# Patient Record
Sex: FEMALE | Race: BLACK OR AFRICAN AMERICAN | NOT HISPANIC OR LATINO | Employment: OTHER | URBAN - METROPOLITAN AREA
[De-identification: names, ages, dates, MRNs, and addresses within clinical notes are randomized per-mention and may not be internally consistent; named-entity substitution may affect disease eponyms.]

---

## 2018-07-11 ENCOUNTER — OFFICE VISIT (OUTPATIENT)
Dept: URGENT CARE | Facility: CLINIC | Age: 63
End: 2018-07-11
Payer: COMMERCIAL

## 2018-07-11 VITALS
SYSTOLIC BLOOD PRESSURE: 141 MMHG | WEIGHT: 136 LBS | BODY MASS INDEX: 25.7 KG/M2 | TEMPERATURE: 98 F | OXYGEN SATURATION: 98 % | DIASTOLIC BLOOD PRESSURE: 78 MMHG | HEART RATE: 70 BPM

## 2018-07-11 DIAGNOSIS — J30.2 SEASONAL ALLERGIC RHINITIS, UNSPECIFIED TRIGGER: ICD-10-CM

## 2018-07-11 DIAGNOSIS — J01.00 ACUTE NON-RECURRENT MAXILLARY SINUSITIS: Primary | ICD-10-CM

## 2018-07-11 PROCEDURE — 99203 OFFICE O/P NEW LOW 30 MIN: CPT | Mod: 25,S$GLB,, | Performed by: SURGERY

## 2018-07-11 PROCEDURE — 96372 THER/PROPH/DIAG INJ SC/IM: CPT | Mod: S$GLB,,, | Performed by: SURGERY

## 2018-07-11 RX ORDER — BETAMETHASONE SODIUM PHOSPHATE AND BETAMETHASONE ACETATE 3; 3 MG/ML; MG/ML
6 INJECTION, SUSPENSION INTRA-ARTICULAR; INTRALESIONAL; INTRAMUSCULAR; SOFT TISSUE
Status: COMPLETED | OUTPATIENT
Start: 2018-07-11 | End: 2018-07-11

## 2018-07-11 RX ORDER — ALBUTEROL SULFATE 90 UG/1
2 AEROSOL, METERED RESPIRATORY (INHALATION) EVERY 6 HOURS PRN
Qty: 18 G | Refills: 0 | Status: SHIPPED | OUTPATIENT
Start: 2018-07-11 | End: 2019-01-15 | Stop reason: SDUPTHER

## 2018-07-11 RX ORDER — OLOPATADINE HYDROCHLORIDE 665 UG/1
1 SPRAY NASAL 2 TIMES DAILY
Qty: 1 BOTTLE | Refills: 2 | OUTPATIENT
Start: 2018-07-11 | End: 2022-08-16

## 2018-07-11 RX ORDER — MONTELUKAST SODIUM 10 MG/1
TABLET ORAL
COMMUNITY
Start: 2018-05-03 | End: 2019-04-25 | Stop reason: SDUPTHER

## 2018-07-11 RX ORDER — PREDNISONE 20 MG/1
40 TABLET ORAL DAILY
Qty: 10 TABLET | Refills: 0 | Status: SHIPPED | OUTPATIENT
Start: 2018-07-12 | End: 2018-07-17

## 2018-07-11 RX ORDER — TRAZODONE HYDROCHLORIDE 50 MG/1
TABLET ORAL
COMMUNITY
Start: 2018-04-07 | End: 2019-09-09

## 2018-07-11 RX ADMIN — BETAMETHASONE SODIUM PHOSPHATE AND BETAMETHASONE ACETATE 6 MG: 3; 3 INJECTION, SUSPENSION INTRA-ARTICULAR; INTRALESIONAL; INTRAMUSCULAR; SOFT TISSUE at 08:07

## 2018-07-11 NOTE — PROGRESS NOTES
Subjective:       Patient ID: Dayana Su is a 62 y.o. female.    Vitals:  weight is 61.7 kg (136 lb). Her temperature is 97.9 °F (36.6 °C). Her blood pressure is 141/78 (abnormal) and her pulse is 70. Her oxygen saturation is 98%.     Chief Complaint: Sinus Problem    Sinus Problem   This is a recurrent problem. The current episode started more than 1 month ago. The problem is unchanged. There has been no fever. Associated symptoms include congestion, coughing, headaches and sinus pressure. Pertinent negatives include no chills, shortness of breath or sore throat.     Review of Systems   Constitution: Negative for chills and fever.   HENT: Positive for congestion and sinus pressure. Negative for sore throat.    Eyes: Negative for blurred vision.   Cardiovascular: Negative for chest pain.   Respiratory: Positive for cough. Negative for shortness of breath.    Skin: Negative for rash.   Musculoskeletal: Negative for back pain and joint pain.   Gastrointestinal: Negative for abdominal pain, diarrhea, nausea and vomiting.   Neurological: Positive for headaches.   Psychiatric/Behavioral: The patient is not nervous/anxious.    All other systems reviewed and are negative.      Objective:      Physical Exam   Constitutional: She is oriented to person, place, and time. She appears well-developed and well-nourished. She is cooperative.  Non-toxic appearance. She does not appear ill. No distress.   HENT:   Head: Normocephalic and atraumatic.   Right Ear: Hearing, external ear and ear canal normal. A middle ear effusion (clear) is present.   Left Ear: Hearing, external ear and ear canal normal. A middle ear effusion (clear) is present.   Nose: Mucosal edema and rhinorrhea present. No nasal deformity. No epistaxis. Right sinus exhibits maxillary sinus tenderness. Right sinus exhibits no frontal sinus tenderness. Left sinus exhibits maxillary sinus tenderness. Left sinus exhibits no frontal sinus tenderness.    Mouth/Throat: Uvula is midline and mucous membranes are normal. No trismus in the jaw. Normal dentition. No uvula swelling. Posterior oropharyngeal edema and posterior oropharyngeal erythema present. Tonsils are 2+ on the right. Tonsils are 2+ on the left.   Eyes: Conjunctivae and lids are normal. No scleral icterus.   Sclera clear bilat   Neck: Trachea normal, full passive range of motion without pain and phonation normal. Neck supple.   Cardiovascular: Normal rate, regular rhythm, normal heart sounds, intact distal pulses and normal pulses.    Pulmonary/Chest: Effort normal and breath sounds normal. No respiratory distress.   Abdominal: Soft. Normal appearance and bowel sounds are normal. She exhibits no distension. There is no tenderness.   Musculoskeletal: Normal range of motion. She exhibits no edema or deformity.   Neurological: She is alert and oriented to person, place, and time. She exhibits normal muscle tone. Coordination normal.   Skin: Skin is warm, dry and intact. She is not diaphoretic. No pallor.   Psychiatric: She has a normal mood and affect. Her speech is normal and behavior is normal. Judgment and thought content normal. Cognition and memory are normal.   Nursing note and vitals reviewed.      Assessment:       1. Acute non-recurrent maxillary sinusitis    2. Seasonal allergic rhinitis, unspecified trigger        Plan:         Acute non-recurrent maxillary sinusitis  -     betamethasone acetate-betamethasone sodium phosphate injection 6 mg; Inject 1 mL (6 mg total) into the muscle one time.  -     Ambulatory referral to Allergy  -     predniSONE (DELTASONE) 20 MG tablet; Take 2 tablets (40 mg total) by mouth once daily. for 5 days  Dispense: 10 tablet; Refill: 0  -     olopatadine (PATANASE) 0.6 % nasal spray; 1 spray by Each Nare route 2 (two) times daily.  Dispense: 1 Bottle; Refill: 2  -     albuterol 90 mcg/actuation inhaler; Inhale 2 puffs into the lungs every 6 (six) hours as needed for  Wheezing. Rescue  Dispense: 18 g; Refill: 0    Seasonal allergic rhinitis, unspecified trigger          Patient Instructions     Seasonal Allergy  Seasonal allergy is also called hay fever. It may occur after a person is exposed to pollens released from grasses, weeds, trees and shrubs. This type of allergy occurs during the spring and summer when the pollen contacts the lining of the nose, eyes, eyelids, sinuses and throat. This causes histamine to be released from the tissues. Histamine causes itching and swelling. This may produce a watery discharge from the eyes or nose. Violent sneezing, nasal congestion, post-nasal drip, itching of the eyes, nose, throat and mouth, scratchy throat, and dry cough may also occur.  Home care  Seasonal allergy cannot be cured, but symptoms can be reduced by these measures:  · Avoid or reduce exposure to the allergen as much as you can:    ¨ Stay indoors on windy days of pollen season.   ¨ Keep windows and doors closed. Use air conditioning instead in your home and car. This filters the air.  ¨ Change air conditioner filters often.  ¨ Take a shower, wash your hair, and change clothes after being outdoors.  ¨ Put on a NIOSH-rated 95 filter mask when working outdoors. Before going outside, take your allergy medicine as advised by your healthcare provider.  · Decongestant pills and sprays reduce tissue swelling and watery discharge. Overuse of nasal decongestant sprays may make symptoms worse. Do not use these more often than recommended. Sometimes you can experience a rebound effect (symptoms worsen), when stopping them. Talk to your healthcare provider or pharmacist about these medicines before taking them, especially if you have high blood pressure or heart problems.   · Antihistamines block the release of histamine during the allergic response. They work better when taken before symptoms develop. Unless a prescription antihistamine was prescribed, you can take over-the-counter  antihistamines that do not cause drowsiness.  Ask your pharmacist for suggestions.  · Steroid nasal sprays or oral steroids may also be prescribed for more severe symptoms. These help to reduce the local inflammation that can add to the allergic response.  · If you have asthma, pollen season may make your asthma symptoms worse. It is important that you use your asthma medicines as directed during this time to prevent or treat attacks. Some persons with asthma have asthma symptoms that get worse when they take antihistamines. This is due to the drying effect on the lungs. If you notice this, stop the antihistamines, drink extra fluids and notify your doctor.  · If you have sinus congestion or drainage, a saline nasal rinse may give relief. A saline nasal rinse lessens the swelling and clears excess mucus. This allows sinuses to drain. Prepackaged kits are sold at most drug stores. These contain pre-mixed salt packets and an irrigation device.  Follow-up care  Follow up with your healthcare provider or as directed. If you have been referred to a specialist, make an appointment promptly.  When to seek medical advice  Call your healthcare provider for any of the following:  · Facial, ear or sinus pain; colored drainage from the nose  · Headaches  · You have asthma and your asthma symptoms do not respond to the usual doses of your medicine  · Cough with colored sputum (mucus)  · Fever of 100.4°F (38°C) or higher, or as directed by the healthcare provider  Call 911 if any of these occur:  · Trouble breathing or swallowing, wheezing  · Hoarse voice, trouble speaking, or drooling  · Confusion  · Very drowsy or trouble awakening  · Fainting or loss of consciousness  · Rapid heart rate, or weak pulse  · Low blood pressure  · Feeling of doom  · Nausea, vomiting, abdominal pain, diarrhea  · Vomiting blood, or large amounts of blood in stool  · Seizure  · Cold, moist, or pale (blue in color) skin  Date Last Reviewed: 5/1/2017  ©  1237-6628 [employer]. [address] [place] [address], [place] [address]. All rights reserved. This information is not intended as a substitute for professional medical care. Always follow your healthcare professional's instructions.

## 2018-07-11 NOTE — PATIENT INSTRUCTIONS
Seasonal Allergy  Seasonal allergy is also called hay fever. It may occur after a person is exposed to pollens released from grasses, weeds, trees and shrubs. This type of allergy occurs during the spring and summer when the pollen contacts the lining of the nose, eyes, eyelids, sinuses and throat. This causes histamine to be released from the tissues. Histamine causes itching and swelling. This may produce a watery discharge from the eyes or nose. Violent sneezing, nasal congestion, post-nasal drip, itching of the eyes, nose, throat and mouth, scratchy throat, and dry cough may also occur.  Home care  Seasonal allergy cannot be cured, but symptoms can be reduced by these measures:  · Avoid or reduce exposure to the allergen as much as you can:    ¨ Stay indoors on windy days of pollen season.   ¨ Keep windows and doors closed. Use air conditioning instead in your home and car. This filters the air.  ¨ Change air conditioner filters often.  ¨ Take a shower, wash your hair, and change clothes after being outdoors.  ¨ Put on a NIOSH-rated 95 filter mask when working outdoors. Before going outside, take your allergy medicine as advised by your healthcare provider.  · Decongestant pills and sprays reduce tissue swelling and watery discharge. Overuse of nasal decongestant sprays may make symptoms worse. Do not use these more often than recommended. Sometimes you can experience a rebound effect (symptoms worsen), when stopping them. Talk to your healthcare provider or pharmacist about these medicines before taking them, especially if you have high blood pressure or heart problems.   · Antihistamines block the release of histamine during the allergic response. They work better when taken before symptoms develop. Unless a prescription antihistamine was prescribed, you can take over-the-counter antihistamines that do not cause drowsiness.  Ask your pharmacist for suggestions.  · Steroid nasal sprays or oral steroids may  also be prescribed for more severe symptoms. These help to reduce the local inflammation that can add to the allergic response.  · If you have asthma, pollen season may make your asthma symptoms worse. It is important that you use your asthma medicines as directed during this time to prevent or treat attacks. Some persons with asthma have asthma symptoms that get worse when they take antihistamines. This is due to the drying effect on the lungs. If you notice this, stop the antihistamines, drink extra fluids and notify your doctor.  · If you have sinus congestion or drainage, a saline nasal rinse may give relief. A saline nasal rinse lessens the swelling and clears excess mucus. This allows sinuses to drain. Prepackaged kits are sold at most drug stores. These contain pre-mixed salt packets and an irrigation device.  Follow-up care  Follow up with your healthcare provider or as directed. If you have been referred to a specialist, make an appointment promptly.  When to seek medical advice  Call your healthcare provider for any of the following:  · Facial, ear or sinus pain; colored drainage from the nose  · Headaches  · You have asthma and your asthma symptoms do not respond to the usual doses of your medicine  · Cough with colored sputum (mucus)  · Fever of 100.4°F (38°C) or higher, or as directed by the healthcare provider  Call 911 if any of these occur:  · Trouble breathing or swallowing, wheezing  · Hoarse voice, trouble speaking, or drooling  · Confusion  · Very drowsy or trouble awakening  · Fainting or loss of consciousness  · Rapid heart rate, or weak pulse  · Low blood pressure  · Feeling of doom  · Nausea, vomiting, abdominal pain, diarrhea  · Vomiting blood, or large amounts of blood in stool  · Seizure  · Cold, moist, or pale (blue in color) skin  Date Last Reviewed: 5/1/2017  © 0801-0626 [employer]. [address] [place] [address], [place] [address]. All rights reserved. This information is not  intended as a substitute for professional medical care. Always follow your healthcare professional's instructions.

## 2018-11-08 ENCOUNTER — OFFICE VISIT (OUTPATIENT)
Dept: INTERNAL MEDICINE | Facility: CLINIC | Age: 63
End: 2018-11-08
Payer: COMMERCIAL

## 2018-11-08 VITALS
DIASTOLIC BLOOD PRESSURE: 84 MMHG | HEIGHT: 62 IN | SYSTOLIC BLOOD PRESSURE: 132 MMHG | OXYGEN SATURATION: 99 % | HEART RATE: 64 BPM | WEIGHT: 140 LBS | BODY MASS INDEX: 25.76 KG/M2

## 2018-11-08 DIAGNOSIS — Z82.49 FAMILY HISTORY OF HEART DISEASE: ICD-10-CM

## 2018-11-08 DIAGNOSIS — J31.0 CHRONIC RHINITIS: ICD-10-CM

## 2018-11-08 DIAGNOSIS — Z12.39 BREAST CANCER SCREENING: ICD-10-CM

## 2018-11-08 DIAGNOSIS — Z11.59 ENCOUNTER FOR HEPATITIS C SCREENING TEST FOR LOW RISK PATIENT: ICD-10-CM

## 2018-11-08 DIAGNOSIS — G47.00 INSOMNIA, UNSPECIFIED TYPE: ICD-10-CM

## 2018-11-08 DIAGNOSIS — K21.9 GASTROESOPHAGEAL REFLUX DISEASE, ESOPHAGITIS PRESENCE NOT SPECIFIED: ICD-10-CM

## 2018-11-08 DIAGNOSIS — Z00.00 ANNUAL PHYSICAL EXAM: Primary | ICD-10-CM

## 2018-11-08 LAB
BILIRUB UR QL STRIP: NEGATIVE
CLARITY UR REFRACT.AUTO: CLEAR
COLOR UR AUTO: YELLOW
GLUCOSE UR QL STRIP: NEGATIVE
HGB UR QL STRIP: NEGATIVE
KETONES UR QL STRIP: NEGATIVE
LEUKOCYTE ESTERASE UR QL STRIP: NEGATIVE
NITRITE UR QL STRIP: NEGATIVE
PH UR STRIP: 7 [PH] (ref 5–8)
PROT UR QL STRIP: NEGATIVE
SP GR UR STRIP: 1.01 (ref 1–1.03)
URN SPEC COLLECT METH UR: NORMAL

## 2018-11-08 PROCEDURE — 81003 URINALYSIS AUTO W/O SCOPE: CPT

## 2018-11-08 PROCEDURE — 99386 PREV VISIT NEW AGE 40-64: CPT | Mod: S$GLB,,, | Performed by: NURSE PRACTITIONER

## 2018-11-08 PROCEDURE — 99999 PR PBB SHADOW E&M-EST. PATIENT-LVL IV: CPT | Mod: PBBFAC,,, | Performed by: NURSE PRACTITIONER

## 2018-11-08 NOTE — PROGRESS NOTES
Internal Medicine Annual Exam       CHIEF COMPLAINT     The patient, Dayana Su, who is a 62 y.o. female presents for an annual exam.    HPI   Previous PCP Dr Cardona at [facility]     ?asthma vs reactive airway disease- repots occasional coughing and wheezing. Describes thick PND that causes cough in order to clear.  Has been prescribed albuterol inh as needed in past and does not use regularly.   For the sinus drainage pt is using Singulair, Claritin, and patanase - has been seen by ENT in past.     GERD- uses zantac as needed. Worse when eating alitte at night.     Insomnia- uses trazodone sparingly when needed for sleep     HM-   MMG- 2 years ago   C-scope- >5 years ago. No polyps. Unsure of recommended follow up     Past Medical History:  Past Medical History:   Diagnosis Date    H/O tubal ligation     H/O: hysterectomy        History reviewed. No pertinent surgical history.     Family History   Problem Relation Age of Onset    Heart disease Mother          at 46    Heart disease Father          at 67    Heart disease Sister          26    Heart disease Sister          33    Lupus Sister          at 40     Aneurysm Sister          at 38    Heart failure Brother     Hypertension Sister         Social History     Socioeconomic History    Marital status:      Spouse name: Not on file    Number of children: Not on file    Years of education: Not on file    Highest education level: Not on file   Social Needs    Financial resource strain: Not on file    Food insecurity - worry: Not on file    Food insecurity - inability: Not on file    Transportation needs - medical: Not on file    Transportation needs - non-medical: Not on file   Occupational History    Not on file   Tobacco Use    Smoking status: Former Smoker     Packs/day: 2.00     Start date: 1970     Last attempt to quit: 2000     Years since quittin.5   Substance and Sexual  Activity    Alcohol use: No    Drug use: Not on file    Sexual activity: Not on file   Other Topics Concern    Not on file   Social History Narrative    Not on file        Social History     Tobacco Use   Smoking Status Former Smoker    Packs/day: 2.00    Start date: 1970    Last attempt to quit: 2000    Years since quittin.5        Allergies as of 2018 - Reviewed 2018   Allergen Reaction Noted    Pcn [penicillins] Hives 2014          Home Medications:  Prior to Admission medications    Medication Sig Start Date End Date Taking? Authorizing Provider   albuterol 90 mcg/actuation inhaler Inhale 2 puffs into the lungs every 6 (six) hours as needed for Wheezing. Rescue 18 Yes Cydney Delgado MD   loratadine (CLARITIN) 10 mg tablet TAKE 1 TABLET BY MOUTH EVERY DAY 9/4/15  Yes Edy Elder MD   montelukast (SINGULAIR) 10 mg tablet  5/3/18  Yes Historical Provider, MD   olopatadine (PATANASE) 0.6 % nasal spray 1 spray by Each Nare route 2 (two) times daily. 18 Yes Cydney Delgado MD   ranitidine (ZANTAC) 300 MG tablet  18  Yes Historical Provider, MD   traZODone (DESYREL) 50 MG tablet  18  Yes Historical Provider, MD       Review of Systems:  Review of Systems   Constitutional: Positive for diaphoresis (hot flashes). Negative for activity change, appetite change, chills, fatigue and fever.   HENT: Positive for postnasal drip and rhinorrhea. Negative for congestion, sinus pressure, sinus pain and sore throat.    Eyes: Positive for visual disturbance (ophthaalmology <1 year ago ).   Respiratory: Positive for cough. Negative for shortness of breath and wheezing.    Cardiovascular: Negative for chest pain, palpitations and leg swelling.   Gastrointestinal: Negative for abdominal pain, blood in stool, constipation, diarrhea, nausea and vomiting.        +heartburn    Genitourinary: Positive for frequency. Negative for difficulty urinating, dysuria,  "pelvic pain and urgency.   Musculoskeletal: Positive for arthralgias (bilateral hip and knee pain. No treatment. ).   Skin: Negative for rash.   Neurological: Positive for headaches. Negative for dizziness, weakness and light-headedness.   Psychiatric/Behavioral: Positive for sleep disturbance. Negative for dysphoric mood. The patient is not nervous/anxious.        Health Maintainence:   Immunizations:  Health Maintenance       Date Due Completion Date    Hepatitis C Screening 1955 ---    Lipid Panel 1955 ---    TETANUS VACCINE 12/25/1972 ---    Mammogram 12/25/1994 ---    Colonoscopy 12/25/2004 ---    Zoster Vaccine 12/25/2014 ---    Influenza Vaccine 08/01/2018 ---           PHYSICAL EXAM     BP (!) 158/92 (BP Location: Left arm, Patient Position: Sitting, BP Method: Large (Manual))   Pulse 64   Ht 5' 1.5" (1.562 m)   Wt 63.5 kg (139 lb 15.9 oz)   SpO2 99%   BMI 26.02 kg/m²  Body mass index is 26.02 kg/m².    Physical Exam   Constitutional: She is oriented to person, place, and time. She appears well-developed and well-nourished.   HENT:   Head: Normocephalic.   Right Ear: External ear normal. No mastoid tenderness. Tympanic membrane is scarred. A middle ear effusion is present.   Left Ear: External ear normal. No mastoid tenderness. Tympanic membrane is scarred. A middle ear effusion is present.   Nose: Nose normal.   Mouth/Throat: Oropharynx is clear and moist. No oropharyngeal exudate.   Eyes: Pupils are equal, round, and reactive to light.   Neck: Neck supple. No JVD present. No tracheal deviation present. No thyromegaly present.   Cardiovascular: Normal rate, regular rhythm, normal heart sounds and intact distal pulses. Exam reveals no gallop and no friction rub.   No murmur heard.  Pulmonary/Chest: Effort normal and breath sounds normal. No respiratory distress. She has no wheezes. She has no rales.   Abdominal: Soft. Bowel sounds are normal. She exhibits no distension. There is no " tenderness.   Musculoskeletal: Normal range of motion. She exhibits no edema or tenderness.   Lymphadenopathy:     She has no cervical adenopathy.   Neurological: She is alert and oriented to person, place, and time.   Skin: Skin is warm and dry. No rash noted.   Psychiatric: She has a normal mood and affect. Her behavior is normal.   Vitals reviewed.      LABS     No results found for: LABA1C, HGBA1C  CMP  No results found for: NA, K, CL, CO2, GLU, BUN, CREATININE, CALCIUM, PROT, ALBUMIN, BILITOT, ALKPHOS, AST, ALT, ANIONGAP, ESTGFRAFRICA, EGFRNONAA  No results found for: WBC, HGB, HCT, MCV, PLT  No results found for: CHOL  No results found for: HDL  No results found for: LDLCALC  No results found for: TRIG  No results found for: CHOLHDL  No results found for: TSH, J6EFIVE, L8BCXXY, THYROIDAB    ASSESSMENT/PLAN     Dayana Su is a 62 y.o. female with  Past Medical History:   Diagnosis Date    H/O tubal ligation 1978    H/O: hysterectomy 1986     Annual physical exam- all age and gender related screenings.   -     CBC auto differential; Future; Expected date: 11/08/2018  -     Comprehensive metabolic panel; Future; Expected date: 11/08/2018  -     TSH; Future; Expected date: 11/08/2018  -     T4, free; Future; Expected date: 11/08/2018  -     Urinalysis  -     Vitamin D; Future; Expected date: 11/08/2018  -     Lipid panel; Future; Expected date: 11/08/2018    Chronic rhinitis- will refer to ENT    GERD- stable. Use zantac as needed.     Insomnia- stable. Cont trazodone as needed.  Sleep hygiene discussed.     Family history of heart disease- send for fasting lipids and stress. May benefit from cardiology referral.   -     Exercise stress echo; Future  -     Lipid panel; Future; Expected date: 11/08/2018    Breast cancer screening  -     Mammo Digital Screening Bilat with CAD; Future; Expected date: 11/08/2018    Encounter for hepatitis C screening test for low risk patient  -     Hepatitis C  antibody; Future; Expected date: 11/08/2018    Follow up as needed and with new PCP in 3 months     Lindsey BARRIENTOS, CHASE, FNP-c   Department of Internal Medicine - [facility] [address]  1:09 PM

## 2018-11-13 ENCOUNTER — HOSPITAL ENCOUNTER (OUTPATIENT)
Dept: RADIOLOGY | Facility: HOSPITAL | Age: 63
Discharge: HOME | End: 2018-11-13
Attending: NURSE PRACTITIONER
Payer: COMMERCIAL

## 2018-11-13 ENCOUNTER — HOSPITAL ENCOUNTER (OUTPATIENT)
Dept: CARDIOLOGY | Facility: CLINIC | Age: 63
Discharge: HOME | End: 2018-11-13
Attending: NURSE PRACTITIONER
Payer: COMMERCIAL

## 2018-11-13 VITALS — BODY MASS INDEX: 25.49 KG/M2 | WEIGHT: 135 LBS | HEIGHT: 61 IN

## 2018-11-13 DIAGNOSIS — Z82.49 FAMILY HISTORY OF HEART DISEASE: ICD-10-CM

## 2018-11-13 DIAGNOSIS — Z12.39 BREAST CANCER SCREENING: ICD-10-CM

## 2018-11-13 LAB
ASCENDING AORTA: 2.58 CM
BSA FOR ECHO PROCEDURE: 1.62 M2
CV ECHO LV RWT: 0.34 CM
CV STRESS BASE HR: 64
DIASTOLIC BLOOD PRESSURE: 74
DOP CALC LVOT AREA: 2.77 CM2
DOP CALC LVOT DIAMETER: 1.88 CM
DOP CALC LVOT STROKE VOLUME: 60.18 CM3
DOP CALCLVOT PEAK VEL VTI: 21.69 CM
E WAVE DECELERATION TIME: 186.13 MSEC
E/A RATIO: 1
E/E' RATIO: 7.71
ECHO LV POSTERIOR WALL: 0.66 CM (ref 0.6–1.1)
FRACTIONAL SHORTENING: 34 % (ref 28–44)
INTERVENTRICULAR SEPTUM: 0.81 CM (ref 0.6–1.1)
LA MAJOR: 3.89 CM
LA MINOR: 3.77 CM
LA WIDTH: 3.08 CM
LEFT ATRIUM SIZE: 2.96 CM
LEFT ATRIUM VOLUME INDEX: 18.3 ML/M2
LEFT ATRIUM VOLUME: 29.67 CM3
LEFT INTERNAL DIMENSION IN SYSTOLE: 2.6 CM (ref 2.1–4)
LEFT VENTRICLE DIASTOLIC VOLUME INDEX: 41.38 ML/M2
LEFT VENTRICLE DIASTOLIC VOLUME: 67.03 ML
LEFT VENTRICLE MASS INDEX: 50.1 G/M2
LEFT VENTRICLE SYSTOLIC VOLUME INDEX: 15.2 ML/M2
LEFT VENTRICLE SYSTOLIC VOLUME: 24.64 ML
LEFT VENTRICULAR INTERNAL DIMENSION IN DIASTOLE: 3.93 CM (ref 3.5–6)
LEFT VENTRICULAR MASS: 81.11 G
LV LATERAL E/E' RATIO: 6.75
LV SEPTAL E/E' RATIO: 9
MV PEAK A VEL: 0.81 M/S
MV PEAK E VEL: 0.81 M/S
OHS CV CPX 1 MINUTE RECOVERY HEART RATE: 96 BPM
OHS CV CPX 85 PERCENT MAX PREDICTED HEART RATE MALE: 128
OHS CV CPX ESTIMATED METS: 11
OHS CV CPX MAX PREDICTED HEART RATE: 151
OHS CV CPX PATIENT IS FEMALE: 1
OHS CV CPX PATIENT IS MALE: 0
OHS CV CPX PEAK DIASTOLIC BLOOD PRESSURE: 77 MMHG
OHS CV CPX PEAK HEAR RATE: 139
OHS CV CPX PEAK RATE PRESSURE PRODUCT: NORMAL
OHS CV CPX PEAK SYSTOLIC BLOOD PRESSURE: 187
OHS CV CPX PERCENT MAX PREDICTED HEART RATE ACHIEVED: 92
OHS CV CPX PERCENT TARGET HEART RATE ACHIEVED: 108.59
OHS CV CPX RATE PRESSURE PRODUCT PRESENTING: 9024
OHS CV CPX TARGET HEART RATE: 128
PULM VEIN S/D RATIO: 1.04
PV PEAK D VEL: 0.54 M/S
PV PEAK S VEL: 0.56 M/S
RA MAJOR: 4.15 CM
RA WIDTH: 3.33 CM
RIGHT VENTRICULAR END-DIASTOLIC DIMENSION: 3.38 CM
RV TISSUE DOPPLER FREE WALL SYSTOLIC VELOCITY 1 (APICAL 4 CHAMBER VIEW): 13.14 M/S
SINUS: 2.57 CM
STJ: 2.43 CM
STRESS ECHO POST EXERCISE DUR MIN: 6 MIN
STRESS ECHO POST EXERCISE DUR SEC: 40
STRESS ST DEPRESSION: 1 MM
SYSTOLIC BLOOD PRESSURE: 141
TDI LATERAL: 0.12
TDI SEPTAL: 0.09
TDI: 0.11
TRICUSPID ANNULAR PLANE SYSTOLIC EXCURSION: 1.97 CM

## 2018-11-13 PROCEDURE — 77067 SCR MAMMO BI INCL CAD: CPT | Mod: 26,,, | Performed by: RADIOLOGY

## 2018-11-13 PROCEDURE — 93351 STRESS TTE COMPLETE: CPT | Mod: S$GLB,,, | Performed by: INTERNAL MEDICINE

## 2018-11-13 PROCEDURE — 77063 BREAST TOMOSYNTHESIS BI: CPT | Mod: TC

## 2018-11-13 PROCEDURE — 77063 BREAST TOMOSYNTHESIS BI: CPT | Mod: 26,,, | Performed by: RADIOLOGY

## 2018-11-14 ENCOUNTER — TELEPHONE (OUTPATIENT)
Dept: INTERNAL MEDICINE | Facility: CLINIC | Age: 63
End: 2018-11-14

## 2018-11-14 DIAGNOSIS — R94.39 ABNORMAL STRESS TEST: Primary | ICD-10-CM

## 2018-11-14 NOTE — TELEPHONE ENCOUNTER
Informed pt of PA's advice, pt denied having any symptoms.     appt rescheduled to 11/19 at 2:15 as pt requested.

## 2018-11-14 NOTE — TELEPHONE ENCOUNTER
Please call patient.    Let her know that I am covering for Lindsey while she is out.    Please let her know that based on her stress test results she needs further evaluation with cardiology.  I have placed a referral and scheduled her an appointment for tomorrow.  It is scheduled at the [facility] location.  Please see if she is having any symptoms (chest pain, shortness of breath).

## 2018-11-15 ENCOUNTER — TELEPHONE (OUTPATIENT)
Dept: INTERNAL MEDICINE | Facility: CLINIC | Age: 63
End: 2018-11-15

## 2018-11-15 DIAGNOSIS — Z00.00 ANNUAL PHYSICAL EXAM: Primary | ICD-10-CM

## 2018-11-15 NOTE — TELEPHONE ENCOUNTER
----- Message from Peggy Clifton sent at 11/15/2018  7:43 AM CST -----  Contact: self/[phone]  Pt called in regards to getting her lab orders sent to [facility]. Pt is at quest right know. ID.me fax number [phone].      Please advse

## 2018-11-19 ENCOUNTER — OFFICE VISIT (OUTPATIENT)
Dept: CARDIOLOGY | Facility: CLINIC | Age: 63
End: 2018-11-19
Payer: COMMERCIAL

## 2018-11-19 VITALS
SYSTOLIC BLOOD PRESSURE: 159 MMHG | OXYGEN SATURATION: 100 % | WEIGHT: 141.13 LBS | BODY MASS INDEX: 26.65 KG/M2 | DIASTOLIC BLOOD PRESSURE: 76 MMHG | HEIGHT: 61 IN | HEART RATE: 64 BPM

## 2018-11-19 DIAGNOSIS — R07.89 CHEST PAIN, ATYPICAL: ICD-10-CM

## 2018-11-19 LAB
1,25(OH)2D SERPL-MCNC: 71 PG/ML (ref 18–72)
1,25(OH)2D2 SERPL-MCNC: <8 PG/ML
1,25(OH)2D3 SERPL-MCNC: 71 PG/ML
ALBUMIN SERPL-MCNC: 3.9 G/DL (ref 3.6–5.1)
ALBUMIN/GLOB SERPL: 1.6 (CALC) (ref 1–2.5)
ALP SERPL-CCNC: 53 U/L (ref 33–130)
ALT SERPL-CCNC: 16 U/L (ref 6–29)
AST SERPL-CCNC: 23 U/L (ref 10–35)
BASOPHILS # BLD AUTO: 89 CELLS/UL (ref 0–200)
BASOPHILS NFR BLD AUTO: 1.5 %
BILIRUB SERPL-MCNC: 0.5 MG/DL (ref 0.2–1.2)
BUN SERPL-MCNC: 9 MG/DL (ref 7–25)
BUN/CREAT SERPL: NORMAL (CALC) (ref 6–22)
CALCIUM SERPL-MCNC: 9.3 MG/DL (ref 8.6–10.4)
CHLORIDE SERPL-SCNC: 107 MMOL/L (ref 98–110)
CHOLEST SERPL-MCNC: 194 MG/DL
CHOLEST/HDLC SERPL: 3.5 (CALC)
CO2 SERPL-SCNC: 30 MMOL/L (ref 20–32)
CREAT SERPL-MCNC: 0.75 MG/DL (ref 0.5–0.99)
EOSINOPHIL # BLD AUTO: 738 CELLS/UL (ref 15–500)
EOSINOPHIL NFR BLD AUTO: 12.5 %
ERYTHROCYTE [DISTWIDTH] IN BLOOD BY AUTOMATED COUNT: 12.8 % (ref 11–15)
GFR SERPL CREATININE-BSD FRML MDRD: 85 ML/MIN/1.73M2
GLOBULIN SER CALC-MCNC: 2.4 G/DL (CALC) (ref 1.9–3.7)
GLUCOSE SERPL-MCNC: 88 MG/DL (ref 65–99)
HCT VFR BLD AUTO: 39.2 % (ref 35–45)
HCV AB S/CO SERPL IA: 0
HCV AB SERPL QL IA: NORMAL
HDLC SERPL-MCNC: 56 MG/DL
HGB BLD-MCNC: 12.7 G/DL (ref 11.7–15.5)
LDLC SERPL CALC-MCNC: 120 MG/DL (CALC)
LYMPHOCYTES # BLD AUTO: 2637 CELLS/UL (ref 850–3900)
LYMPHOCYTES NFR BLD AUTO: 44.7 %
MCH RBC QN AUTO: 28.5 PG (ref 27–33)
MCHC RBC AUTO-ENTMCNC: 32.4 G/DL (ref 32–36)
MCV RBC AUTO: 87.9 FL (ref 80–100)
MONOCYTES # BLD AUTO: 608 CELLS/UL (ref 200–950)
MONOCYTES NFR BLD AUTO: 10.3 %
NEUTROPHILS # BLD AUTO: 1829 CELLS/UL (ref 1500–7800)
NEUTROPHILS NFR BLD AUTO: 31 %
NONHDLC SERPL-MCNC: 138 MG/DL (CALC)
PLATELET # BLD AUTO: 279 THOUSAND/UL (ref 140–400)
PMV BLD REES-ECKER: 11.1 FL (ref 7.5–12.5)
POTASSIUM SERPL-SCNC: 4.3 MMOL/L (ref 3.5–5.3)
PROT SERPL-MCNC: 6.3 G/DL (ref 6.1–8.1)
RBC # BLD AUTO: 4.46 MILLION/UL (ref 3.8–5.1)
SODIUM SERPL-SCNC: 142 MMOL/L (ref 135–146)
T4 FREE SERPL-MCNC: 1.1 NG/DL (ref 0.8–1.8)
TRIGL SERPL-MCNC: 82 MG/DL
TSH SERPL-ACNC: 2.66 MIU/L (ref 0.4–4.5)
WBC # BLD AUTO: 5.9 THOUSAND/UL (ref 3.8–10.8)

## 2018-11-19 PROCEDURE — 99213 OFFICE O/P EST LOW 20 MIN: CPT | Mod: S$GLB,,, | Performed by: INTERNAL MEDICINE

## 2018-11-19 PROCEDURE — 99999 PR PBB SHADOW E&M-EST. PATIENT-LVL IV: CPT | Mod: PBBFAC,,, | Performed by: INTERNAL MEDICINE

## 2018-11-19 NOTE — LETTER
November 19, 2018      Lindsey Jones, NP  [address]  [facility] [address]           [facility] - Cardiology  [address] [facility] [address]  [place] [address]  Phone: [phone]          Patient: Dayana Su   MR Number: [id number]   YOB: 1955   Date of Visit: 11/19/2018       Dear Lindsey Jones:    Thank you for referring Dayana Su to me for evaluation. Attached you will find relevant portions of my assessment and plan of care.    If you have questions, please do not hesitate to call me. I look forward to following Dayana Su along with you.    Sincerely,    Antoine Nichols MD    Enclosure  CC:  No Recipients    If you would like to receive this communication electronically, please contact [email or web address] or [phone] to request more information on [email or web address] access.    For providers and/or their staff who would like to refer a patient to [facility], please contact us through our one-stop-shop provider referral line, Lou Webb, at [phone].    If you feel you have received this communication in error or would no longer like to receive these types of communications, please e-mail [email or web address]

## 2018-11-19 NOTE — PROGRESS NOTES
Subjective:    Patient ID:  Dayana Su is a 62 y.o. female who presents for evaluation of Chest Pain      HPI       Saw NP 11/8/18  ?asthma vs reactive airway disease- repots occasional coughing and wheezing. Describes thick PND that causes cough in order to clear.  Has been prescribed albuterol inh as needed in past and does not use regularly.   For the sinus drainage pt is using Singulair, Claritin, and patanase - has been seen by ENT in past.      GERD- uses zantac as needed. Worse when eating alitte at night.      Insomnia- uses trazodone sparingly when needed for sleep      HM-   MMG- 2 years ago   C-scope- >5 years ago. No polyps. Unsure of recommended follow up             Reports chest tightness worsening for 1 week. Lasts a few seconds - usually at rest  Strong family Hx CAD    EKG 11/13/18  NSR - normal EKG    Stress echo 11/13/18  · The stress echo portion of this study is negative for myocardial ischemia.  · Normal left ventricular systolic function. The estimated ejection fraction is 60%  · Normal LV diastolic function.  · The patient reported no symptoms during the stress test.  · The test was stopped secondary to fatigue.  · There were no arrhythmias during stress.  · Overall, the patient's exercise capacity was average.  · Mild right atrial enlargement.  · Normal right ventricular systolic function.  · There is 1 mm of depression in the inferior leads.  · The EKG portion of this study is suspicious for myocardial ischemia.        Review of Systems   Constitution: Negative for decreased appetite.   HENT: Negative for ear discharge.    Eyes: Negative for blurred vision.   Respiratory: Negative for hemoptysis.    Endocrine: Negative for polyphagia.   Hematologic/Lymphatic: Negative for adenopathy.   Skin: Negative for color change.   Musculoskeletal: Negative for joint swelling.   Neurological: Negative for brief paralysis.   Psychiatric/Behavioral: Negative for hallucinations.         Objective:    Physical Exam   Constitutional: She is oriented to person, place, and time. She appears well-developed and well-nourished.   HENT:   Head: Normocephalic and atraumatic.   Eyes: Conjunctivae are normal. Pupils are equal, round, and reactive to light.   Neck: Normal range of motion. Neck supple.   Cardiovascular: Normal rate, normal heart sounds and intact distal pulses.   Pulmonary/Chest: Effort normal and breath sounds normal.   Abdominal: Soft. Bowel sounds are normal.   Musculoskeletal: Normal range of motion.   Neurological: She is alert and oriented to person, place, and time.   Skin: Skin is warm and dry.         Assessment:       1. Chest pain, atypical         Plan:       CP atypical  Stress echo negative for ischemia

## 2018-11-20 ENCOUNTER — TELEPHONE (OUTPATIENT)
Dept: INTERNAL MEDICINE | Facility: CLINIC | Age: 63
End: 2018-11-20

## 2018-11-20 NOTE — TELEPHONE ENCOUNTER
Obtaining prior mammograms for comparison as of 11/20/18.  Will continue to follow for updated report.

## 2018-11-27 ENCOUNTER — TELEPHONE (OUTPATIENT)
Dept: INTERNAL MEDICINE | Facility: CLINIC | Age: 63
End: 2018-11-27

## 2018-11-27 NOTE — TELEPHONE ENCOUNTER
----- Message from Shell Neol MD sent at 11/27/2018 10:23 AM CST -----  Hi Jeni,  I suspect it is related to her allergic rhinitis.  It also says it the note that she has ?asthma vs reactive airways.  Both of these can cause this.  If her allergies are severe, she can consider seeing an allergist but it looks like Lindsey already has her going to see ENT, so that's fine.    Thanks,  Shell  ----- Message -----  From: Jeni Hudson PA-C  Sent: 11/27/2018  10:04 AM  To: MD Dr. Sadie Grimes,    Will you review regarding elevated eosinophil level?      This is a patient Lindsey saw with no PCP.      Let me know what you think.    Best,  Jeni Hudson PA-C  [facility]  [address] [place] [id number]  P: [phone]  F: [phone]

## 2018-12-05 ENCOUNTER — TELEPHONE (OUTPATIENT)
Dept: INTERNAL MEDICINE | Facility: CLINIC | Age: 63
End: 2018-12-05

## 2018-12-05 NOTE — TELEPHONE ENCOUNTER
----- Message from Jacy Ingram sent at 12/5/2018  2:46 PM CST -----  Contact: [facility] [phone] ext [phone]   Patient had a Stress Echo Test done Nov 9th and [facility] is requesting a copy.      Fax Number- [phone]

## 2018-12-05 NOTE — TELEPHONE ENCOUNTER
LVM for pt to call back the office, unable to release pt's records to outside facility, pt will need to request records from medical records department or have outside facility fax a signed release of information form.

## 2019-01-15 ENCOUNTER — TELEPHONE (OUTPATIENT)
Dept: INTERNAL MEDICINE | Facility: CLINIC | Age: 64
End: 2019-01-15

## 2019-01-15 ENCOUNTER — OFFICE VISIT (OUTPATIENT)
Dept: INTERNAL MEDICINE | Facility: CLINIC | Age: 64
End: 2019-01-15
Payer: COMMERCIAL

## 2019-01-15 VITALS
HEART RATE: 62 BPM | WEIGHT: 136.69 LBS | OXYGEN SATURATION: 99 % | HEIGHT: 61 IN | DIASTOLIC BLOOD PRESSURE: 76 MMHG | BODY MASS INDEX: 25.81 KG/M2 | SYSTOLIC BLOOD PRESSURE: 118 MMHG

## 2019-01-15 DIAGNOSIS — Z13.5 SCREENING FOR EYE CONDITION: ICD-10-CM

## 2019-01-15 DIAGNOSIS — Z12.11 SCREENING FOR MALIGNANT NEOPLASM OF COLON: ICD-10-CM

## 2019-01-15 DIAGNOSIS — R05.9 COUGH: ICD-10-CM

## 2019-01-15 DIAGNOSIS — D72.10 EOSINOPHILIA: ICD-10-CM

## 2019-01-15 DIAGNOSIS — J01.00 ACUTE NON-RECURRENT MAXILLARY SINUSITIS: ICD-10-CM

## 2019-01-15 DIAGNOSIS — Z82.49 FAMILY HISTORY OF BRAIN ANEURYSM: Primary | ICD-10-CM

## 2019-01-15 DIAGNOSIS — M89.9 DISORDER OF BONE: ICD-10-CM

## 2019-01-15 PROCEDURE — 90715 TDAP VACCINE 7 YRS/> IM: CPT | Mod: S$GLB,,, | Performed by: INTERNAL MEDICINE

## 2019-01-15 PROCEDURE — 96372 THER/PROPH/DIAG INJ SC/IM: CPT | Mod: S$GLB,,, | Performed by: INTERNAL MEDICINE

## 2019-01-15 PROCEDURE — 99999 PR PBB SHADOW E&M-EST. PATIENT-LVL V: ICD-10-PCS | Mod: PBBFAC,,, | Performed by: INTERNAL MEDICINE

## 2019-01-15 PROCEDURE — 96372 PR INJECTION,THERAP/PROPH/DIAG2ST, IM OR SUBCUT: ICD-10-PCS | Mod: S$GLB,,, | Performed by: INTERNAL MEDICINE

## 2019-01-15 PROCEDURE — 99396 PREV VISIT EST AGE 40-64: CPT | Mod: 25,S$GLB,, | Performed by: INTERNAL MEDICINE

## 2019-01-15 PROCEDURE — 90471 TDAP VACCINE GREATER THAN OR EQUAL TO 7YO IM: ICD-10-PCS | Mod: 59,S$GLB,, | Performed by: INTERNAL MEDICINE

## 2019-01-15 PROCEDURE — 90715 TDAP VACCINE GREATER THAN OR EQUAL TO 7YO IM: ICD-10-PCS | Mod: S$GLB,,, | Performed by: INTERNAL MEDICINE

## 2019-01-15 PROCEDURE — 99999 PR PBB SHADOW E&M-EST. PATIENT-LVL V: CPT | Mod: PBBFAC,,, | Performed by: INTERNAL MEDICINE

## 2019-01-15 PROCEDURE — 99396 PR PREVENTIVE VISIT,EST,40-64: ICD-10-PCS | Mod: 25,S$GLB,, | Performed by: INTERNAL MEDICINE

## 2019-01-15 PROCEDURE — 90471 IMMUNIZATION ADMIN: CPT | Mod: 59,S$GLB,, | Performed by: INTERNAL MEDICINE

## 2019-01-15 RX ORDER — TRAZODONE HYDROCHLORIDE 100 MG/1
100 TABLET ORAL NIGHTLY
Qty: 30 TABLET | Refills: 6 | Status: SHIPPED | OUTPATIENT
Start: 2019-01-15 | End: 2019-10-08

## 2019-01-15 RX ORDER — DOXYCYCLINE HYCLATE 100 MG
TABLET ORAL
Qty: 20 TABLET | Refills: 0 | Status: SHIPPED | OUTPATIENT
Start: 2019-01-15 | End: 2019-01-15 | Stop reason: SDUPTHER

## 2019-01-15 RX ORDER — BIOTIN 1 MG
1000 TABLET ORAL 3 TIMES DAILY
COMMUNITY

## 2019-01-15 RX ORDER — ALBUTEROL SULFATE 90 UG/1
2 AEROSOL, METERED RESPIRATORY (INHALATION) EVERY 6 HOURS PRN
Qty: 18 G | Refills: 0 | Status: SHIPPED | OUTPATIENT
Start: 2019-01-15 | End: 2021-01-25 | Stop reason: SDUPTHER

## 2019-01-15 RX ORDER — CALCIUM CARBONATE/VITAMIN D3 250-3.125
1 TABLET ORAL ONCE
COMMUNITY

## 2019-01-15 RX ORDER — DOXYCYCLINE HYCLATE 100 MG
TABLET ORAL
Qty: 20 TABLET | Refills: 0 | Status: SHIPPED | OUTPATIENT
Start: 2019-01-15 | End: 2019-04-25

## 2019-01-15 RX ORDER — ROSUVASTATIN CALCIUM 20 MG/1
20 TABLET, COATED ORAL DAILY
COMMUNITY
End: 2019-04-25 | Stop reason: SDUPTHER

## 2019-01-15 RX ORDER — AMOXICILLIN 500 MG
CAPSULE ORAL DAILY
COMMUNITY

## 2019-01-15 RX ORDER — EPINEPHRINE 0.22MG
100 AEROSOL WITH ADAPTER (ML) INHALATION DAILY
COMMUNITY
End: 2019-10-08

## 2019-01-15 RX ORDER — TRIAMCINOLONE ACETONIDE 40 MG/ML
40 INJECTION, SUSPENSION INTRA-ARTICULAR; INTRAMUSCULAR
Status: COMPLETED | OUTPATIENT
Start: 2019-01-15 | End: 2019-01-15

## 2019-01-15 RX ORDER — BENZONATATE 100 MG/1
100 CAPSULE ORAL 3 TIMES DAILY PRN
Qty: 30 CAPSULE | Refills: 0 | Status: SHIPPED | OUTPATIENT
Start: 2019-01-15 | End: 2019-01-25

## 2019-01-15 RX ADMIN — TRIAMCINOLONE ACETONIDE 40 MG: 40 INJECTION, SUSPENSION INTRA-ARTICULAR; INTRAMUSCULAR at 12:01

## 2019-01-15 NOTE — TELEPHONE ENCOUNTER
----- Message from Kemi Hoff sent at 1/15/2019  1:02 PM CST -----  Contact: pharmacyAndre/ [phone]  Pharmacy is calling to clarify an RX.  RX name:  doxycycline (VIBRA-TABS) 100 MG tablet  What do they need to clarify: directions  Comments:

## 2019-01-21 ENCOUNTER — TELEPHONE (OUTPATIENT)
Dept: ENDOSCOPY | Facility: HOSPITAL | Age: 64
End: 2019-01-21

## 2019-01-28 NOTE — PROGRESS NOTES
Subjective:       Patient ID: Dayana Su is a 63 y.o. female.    Chief Complaint: Establish Care    HPIPt is new to me and here to establish care.  She has a cough and some chronic sinus symptoms.  No CP or SOB.  Review of Systems   Respiratory: Positive for cough. Negative for shortness of breath (PND or orthopnea).    Cardiovascular: Negative for chest pain (arm pain or jaw pain).   Gastrointestinal: Negative for abdominal pain, diarrhea, nausea and vomiting.   Genitourinary: Negative for dysuria.   Neurological: Negative for seizures, syncope and headaches.       Objective:      Physical Exam   Constitutional: She is oriented to person, place, and time. She appears well-developed and well-nourished. No distress.   HENT:   Head: Normocephalic.   Mouth/Throat: Oropharynx is clear and moist.   Neck: Neck supple. No JVD present. No thyromegaly present.   Cardiovascular: Normal rate, regular rhythm, normal heart sounds and intact distal pulses. Exam reveals no gallop and no friction rub.   No murmur heard.  Pulmonary/Chest: Effort normal and breath sounds normal. She has no wheezes. She has no rales.   Abdominal: Soft. Bowel sounds are normal. She exhibits no distension and no mass. There is no tenderness. There is no rebound and no guarding.   Musculoskeletal: She exhibits no edema.   Lymphadenopathy:     She has no cervical adenopathy.   Neurological: She is alert and oriented to person, place, and time. She has normal reflexes.   Skin: Skin is warm and dry.   Psychiatric: She has a normal mood and affect. Her behavior is normal. Judgment and thought content normal.       Assessment:       1. Family history of brain aneurysm    2. Cough    3. Screening for malignant neoplasm of colon    4. Disorder of bone    5. Screening for eye condition    6. Eosinophilia    7. Acute non-recurrent maxillary sinusitis        Plan:   Family history of brain aneurysm  -     MRI Brain W WO Contrast; Future; Expected  date: 01/15/2019  -     MRA Brain without contrast; Future; Expected date: 01/15/2019    Cough  -     X-Ray Chest PA And Lateral; Future; Expected date: 01/15/2019  -     Complete PFT with bronchodilator; Future    Screening for malignant neoplasm of colon  -     Case request GI: COLONOSCOPY    Disorder of bone  -     DXA Bone Density Spine And Hip; Future; Expected date: 01/15/2019    Screening for eye condition  -     Ambulatory consult to Optometry    Eosinophilia  -     CBC auto differential; Future; Expected date: 01/15/2019  -     Basic metabolic panel; Future; Expected date: 01/15/2019  -     CBC auto differential; Future; Expected date: 01/15/2019  -     Basic metabolic panel; Future; Expected date: 01/15/2019  Recheck lab - may need allergy referral  Acute non-recurrent maxillary sinusitis  -     albuterol (PROVENTIL/VENTOLIN HFA) 90 mcg/actuation inhaler; Inhale 2 puffs into the lungs every 6 (six) hours as needed for Wheezing. Rescue  Dispense: 18 g; Refill: 0    Other orders  -     Tdap Vaccine  -      doxycycline (VIBRA-TABS) 100 MG tablet; One tablet with food and a full glass of water  Dispense: 20 tablet; Refill: 0  -     triamcinolone acetonide injection 40 mg  -     benzonatate (TESSALON) 100 MG capsule; Take 1 capsule (100 mg total) by mouth 3 (three) times daily as needed.  Dispense: 30 capsule; Refill: 0  -     traZODone (DESYREL) 100 MG tablet; Take 1 tablet (100 mg total) by mouth every evening.  Dispense: 30 tablet; Refill: 6

## 2019-01-30 ENCOUNTER — HOSPITAL ENCOUNTER (OUTPATIENT)
Dept: RADIOLOGY | Facility: CLINIC | Age: 64
Discharge: HOME | End: 2019-01-30
Attending: INTERNAL MEDICINE
Payer: COMMERCIAL

## 2019-01-30 ENCOUNTER — HOSPITAL ENCOUNTER (OUTPATIENT)
Dept: PULMONOLOGY | Facility: CLINIC | Age: 64
Discharge: HOME | End: 2019-01-30
Payer: COMMERCIAL

## 2019-01-30 DIAGNOSIS — M89.9 DISORDER OF BONE: ICD-10-CM

## 2019-01-30 DIAGNOSIS — R05.9 COUGH: ICD-10-CM

## 2019-01-30 LAB
POST FEV1 FVC: 0.7
POST FEV1: 1.57
POST FVC: 2.23
PRE FEV1 FVC: 68
PRE FEV1: 1.4
PRE FVC: 2.06
PREDICTED FEV1 FVC: 80
PREDICTED FEV1: 2.18
PREDICTED FVC: 2.74

## 2019-01-30 PROCEDURE — 94729 PR C02/MEMBANE DIFFUSE CAPACITY: ICD-10-PCS | Mod: S$GLB,,, | Performed by: INTERNAL MEDICINE

## 2019-01-30 PROCEDURE — 77080 DXA BONE DENSITY AXIAL: CPT | Mod: TC

## 2019-01-30 PROCEDURE — 94060 PR EVAL OF BRONCHOSPASM: ICD-10-PCS | Mod: S$GLB,,, | Performed by: INTERNAL MEDICINE

## 2019-01-30 PROCEDURE — 77080 DXA BONE DENSITY AXIAL: CPT | Mod: 26,,, | Performed by: INTERNAL MEDICINE

## 2019-01-30 PROCEDURE — 94060 EVALUATION OF WHEEZING: CPT | Mod: S$GLB,,, | Performed by: INTERNAL MEDICINE

## 2019-01-30 PROCEDURE — 94729 DIFFUSING CAPACITY: CPT | Mod: S$GLB,,, | Performed by: INTERNAL MEDICINE

## 2019-01-30 PROCEDURE — 77080 DEXA BONE DENSITY SPINE HIP: ICD-10-PCS | Mod: 26,,, | Performed by: INTERNAL MEDICINE

## 2019-02-06 LAB
BASOPHILS # BLD AUTO: 110 CELLS/UL (ref 0–200)
BASOPHILS NFR BLD AUTO: 1.8 %
BUN SERPL-MCNC: 9 MG/DL (ref 7–25)
BUN/CREAT SERPL: NORMAL (CALC) (ref 6–22)
CALCIUM SERPL-MCNC: 9.8 MG/DL (ref 8.6–10.4)
CHLORIDE SERPL-SCNC: 108 MMOL/L (ref 98–110)
CO2 SERPL-SCNC: 30 MMOL/L (ref 20–32)
CREAT SERPL-MCNC: 0.7 MG/DL (ref 0.5–0.99)
EOSINOPHIL # BLD AUTO: 519 CELLS/UL (ref 15–500)
EOSINOPHIL NFR BLD AUTO: 8.5 %
ERYTHROCYTE [DISTWIDTH] IN BLOOD BY AUTOMATED COUNT: 13.3 % (ref 11–15)
GFRSERPLBLD MDRD-ARVRAT: 92 ML/MIN/1.73M2
GLUCOSE SERPL-MCNC: 90 MG/DL (ref 65–139)
HCT VFR BLD AUTO: 38.9 % (ref 35–45)
HGB BLD-MCNC: 12.8 G/DL (ref 11.7–15.5)
LYMPHOCYTES # BLD AUTO: 2519 CELLS/UL (ref 850–3900)
LYMPHOCYTES NFR BLD AUTO: 41.3 %
MCH RBC QN AUTO: 28.9 PG (ref 27–33)
MCHC RBC AUTO-ENTMCNC: 32.9 G/DL (ref 32–36)
MCV RBC AUTO: 87.8 FL (ref 80–100)
MONOCYTES # BLD AUTO: 598 CELLS/UL (ref 200–950)
MONOCYTES NFR BLD AUTO: 9.8 %
NEUTROPHILS # BLD AUTO: 2355 CELLS/UL (ref 1500–7800)
NEUTROPHILS NFR BLD AUTO: 38.6 %
PLATELET # BLD AUTO: 299 THOUSAND/UL (ref 140–400)
PMV BLD REES-ECKER: 11.2 FL (ref 7.5–12.5)
POTASSIUM SERPL-SCNC: 4.9 MMOL/L (ref 3.5–5.3)
RBC # BLD AUTO: 4.43 MILLION/UL (ref 3.8–5.1)
SODIUM SERPL-SCNC: 145 MMOL/L (ref 135–146)
WBC # BLD AUTO: 6.1 THOUSAND/UL (ref 3.8–10.8)

## 2019-04-25 ENCOUNTER — OFFICE VISIT (OUTPATIENT)
Dept: INTERNAL MEDICINE | Facility: CLINIC | Age: 64
End: 2019-04-25
Payer: COMMERCIAL

## 2019-04-25 VITALS
TEMPERATURE: 98 F | HEIGHT: 61 IN | WEIGHT: 142.88 LBS | OXYGEN SATURATION: 99 % | SYSTOLIC BLOOD PRESSURE: 120 MMHG | DIASTOLIC BLOOD PRESSURE: 60 MMHG | BODY MASS INDEX: 26.98 KG/M2 | HEART RATE: 63 BPM

## 2019-04-25 DIAGNOSIS — J31.0 CHRONIC RHINITIS: Primary | ICD-10-CM

## 2019-04-25 DIAGNOSIS — M25.562 LEFT KNEE PAIN, UNSPECIFIED CHRONICITY: ICD-10-CM

## 2019-04-25 PROCEDURE — 90471 PNEUMOCOCCAL POLYSACCHARIDE VACCINE 23-VALENT =>2YO SQ IM: ICD-10-PCS | Mod: S$GLB,,, | Performed by: INTERNAL MEDICINE

## 2019-04-25 PROCEDURE — 90732 PNEUMOCOCCAL POLYSACCHARIDE VACCINE 23-VALENT =>2YO SQ IM: ICD-10-PCS | Mod: S$GLB,,, | Performed by: INTERNAL MEDICINE

## 2019-04-25 PROCEDURE — 90732 PPSV23 VACC 2 YRS+ SUBQ/IM: CPT | Mod: S$GLB,,, | Performed by: INTERNAL MEDICINE

## 2019-04-25 PROCEDURE — 99999 PR PBB SHADOW E&M-EST. PATIENT-LVL V: ICD-10-PCS | Mod: PBBFAC,,, | Performed by: INTERNAL MEDICINE

## 2019-04-25 PROCEDURE — 90471 IMMUNIZATION ADMIN: CPT | Mod: S$GLB,,, | Performed by: INTERNAL MEDICINE

## 2019-04-25 PROCEDURE — 99213 OFFICE O/P EST LOW 20 MIN: CPT | Mod: S$GLB,,, | Performed by: INTERNAL MEDICINE

## 2019-04-25 PROCEDURE — 99999 PR PBB SHADOW E&M-EST. PATIENT-LVL V: CPT | Mod: PBBFAC,,, | Performed by: INTERNAL MEDICINE

## 2019-04-25 PROCEDURE — 99213 PR OFFICE/OUTPT VISIT, EST, LEVL III, 20-29 MIN: ICD-10-PCS | Mod: S$GLB,,, | Performed by: INTERNAL MEDICINE

## 2019-04-25 RX ORDER — ROSUVASTATIN CALCIUM 20 MG/1
20 TABLET, COATED ORAL DAILY
Qty: 90 TABLET | Refills: 3 | Status: SHIPPED | OUTPATIENT
Start: 2019-04-25 | End: 2020-05-12

## 2019-04-25 RX ORDER — MONTELUKAST SODIUM 10 MG/1
10 TABLET ORAL DAILY
Qty: 90 TABLET | Refills: 3 | Status: SHIPPED | OUTPATIENT
Start: 2019-04-25 | End: 2021-02-19 | Stop reason: SDUPTHER

## 2019-04-25 RX ORDER — LORATADINE 10 MG/1
10 TABLET ORAL DAILY
Qty: 90 TABLET | Refills: 3 | Status: SHIPPED | OUTPATIENT
Start: 2019-04-25 | End: 2020-05-12

## 2019-04-28 NOTE — PROGRESS NOTES
Subjective:       Patient ID: Dayana Su is a 63 y.o. female.    Chief Complaint: Follow-up (left knee pain) and Medication Refill (zantac)    HPIWe reviewed her studies.  She is feeling well.  No CP or SOB.  Did not complete MRI brain.  Review of Systems   Respiratory: Negative for shortness of breath (PND or orthopnea).    Cardiovascular: Negative for chest pain (arm pain or jaw pain).   Gastrointestinal: Negative for abdominal pain, diarrhea, nausea and vomiting.   Genitourinary: Negative for dysuria.   Neurological: Negative for seizures, syncope and headaches.       Objective:      Physical Exam   Constitutional: She is oriented to person, place, and time. She appears well-developed and well-nourished. No distress.   HENT:   Head: Normocephalic.   Mouth/Throat: Oropharynx is clear and moist.   Neck: Neck supple. No JVD present. No thyromegaly present.   Cardiovascular: Normal rate, regular rhythm, normal heart sounds and intact distal pulses. Exam reveals no gallop and no friction rub.   No murmur heard.  Pulmonary/Chest: Effort normal and breath sounds normal. She has no wheezes. She has no rales.   Abdominal: Soft. Bowel sounds are normal. She exhibits no distension and no mass. There is no tenderness. There is no rebound and no guarding.   Musculoskeletal: She exhibits no edema.   Lymphadenopathy:     She has no cervical adenopathy.   Neurological: She is alert and oriented to person, place, and time. She has normal reflexes.   Skin: Skin is warm and dry.   Psychiatric: She has a normal mood and affect. Her behavior is normal. Judgment and thought content normal.       Assessment:       1. Chronic rhinitis    2. Left knee pain, unspecified chronicity        Plan:   Chronic rhinitis  -     Ambulatory consult to Allergy    Left knee pain, unspecified chronicity  -     X-ray AP Standing Knees with Both Latera; Future; Expected date: 04/25/2019  -     Ambulatory consult to Sports Medicine    Other  orders  -     (In Office Administered) Pneumococcal Polysaccharide Vaccine (23 Valent) (SQ/IM)  -     ranitidine (ZANTAC) 300 MG tablet; Take 1 tablet (300 mg total) by mouth nightly.  Dispense: 90 tablet; Refill: 3  -     montelukast (SINGULAIR) 10 mg tablet; Take 1 tablet (10 mg total) by mouth once daily.  Dispense: 90 tablet; Refill: 3  -     loratadine (CLARITIN) 10 mg tablet; Take 1 tablet (10 mg total) by mouth once daily.  Dispense: 90 tablet; Refill: 3  -     rosuvastatin (CRESTOR) 20 MG tablet; Take 1 tablet (20 mg total) by mouth once daily.  Dispense: 90 tablet; Refill: 3

## 2019-05-07 ENCOUNTER — TELEPHONE (OUTPATIENT)
Dept: INTERNAL MEDICINE | Facility: CLINIC | Age: 64
End: 2019-05-07

## 2019-05-07 NOTE — TELEPHONE ENCOUNTER
[facility] requesting auithorization fro MRI per [employer], Dr. WELSH And nurse called to initiate  authorization at [phone]

## 2019-05-08 NOTE — TELEPHONE ENCOUNTER
----- Message from Mena Hatfield sent at 5/8/2019  1:06 PM CDT -----  Contact: SELF/[phone]  Please call patient about auth for MRI  and X rays.

## 2019-05-08 NOTE — TELEPHONE ENCOUNTER
Patient stating [facility] told her today the authorization they received was for [facility] and they would need to cancel her appointment. I spoke with MICHAEL at [facility] this morning to give her the Authorization number to have MRI done there. I left a message for her to call back to clarify to see where the decadency is.

## 2019-05-09 ENCOUNTER — TELEPHONE (OUTPATIENT)
Dept: SPORTS MEDICINE | Facility: CLINIC | Age: 64
End: 2019-05-09

## 2019-05-09 NOTE — TELEPHONE ENCOUNTER
Patient 5/15 appointment with Dr. Hatfield was cancelled. Patient did not want to reschedule at this time.

## 2019-05-14 ENCOUNTER — TELEPHONE (OUTPATIENT)
Dept: INTERNAL MEDICINE | Facility: CLINIC | Age: 64
End: 2019-05-14

## 2019-05-14 NOTE — TELEPHONE ENCOUNTER
----- Message from Ginna Hatfield sent at 5/14/2019  9:14 AM CDT -----  Contact: Alena/ [facility]/ [phone] ext [phone]  Imaging states that they are calling to speak with someone in regards to the pt. She states that they do not have authorization for the pt to be seen. She also states that the pt's insurance company will not approve it without authorization from the doctor. She states that this needs to be faxed over to [phone] medical notes ref# [id number] is on the cover when the reference notes are faxed over. Please call back and advise.      Thanks

## 2019-05-15 ENCOUNTER — TELEPHONE (OUTPATIENT)
Dept: INTERNAL MEDICINE | Facility: CLINIC | Age: 64
End: 2019-05-15

## 2019-05-15 NOTE — TELEPHONE ENCOUNTER
----- Message from Matt Silva sent at 5/15/2019 12:02 PM CDT -----  Contact: Patient [phone]  Patient would like to get medical advice.    Comments: patient stating [facility] is needing the office notes of last visit for the patient to have the MRI/MRA and the X RAY of the knee left. Previous message is requesting orders to be sent to [facility].     Please call an advise  Thank you

## 2019-05-15 NOTE — TELEPHONE ENCOUNTER
----- Message from Matt Silva sent at 5/15/2019 11:58 AM CDT -----  Contact: Patient [phone]  Type: Orders Request    What orders/ testing are being requested? MRI and MRA of the brain/X RAY of knee    Is there a future appointment scheduled for the patient with PCP? No     When?    Comments: would like a call back to inform the orders have been granted, would like to be sent to [facility], Fax [phone]    Please call an advise  Thank you

## 2019-05-20 ENCOUNTER — TELEPHONE (OUTPATIENT)
Dept: INTERNAL MEDICINE | Facility: CLINIC | Age: 64
End: 2019-05-20

## 2019-05-20 NOTE — TELEPHONE ENCOUNTER
----- Message from Loreto Pickett sent at 5/20/2019  2:22 PM CDT -----  Contact: [facility]/[place]/[phone] ext [phone]   Representative called in regards needing to check on the orders for MRI and MRA of the brain/X RAY of knee. Please call and advise. Thank you

## 2019-09-09 ENCOUNTER — OFFICE VISIT (OUTPATIENT)
Dept: INTERNAL MEDICINE | Facility: CLINIC | Age: 64
End: 2019-09-09
Payer: COMMERCIAL

## 2019-09-09 VITALS
WEIGHT: 143.5 LBS | HEIGHT: 62 IN | BODY MASS INDEX: 26.41 KG/M2 | SYSTOLIC BLOOD PRESSURE: 146 MMHG | TEMPERATURE: 98 F | DIASTOLIC BLOOD PRESSURE: 78 MMHG | OXYGEN SATURATION: 99 % | HEART RATE: 79 BPM

## 2019-09-09 DIAGNOSIS — R51.9 NONINTRACTABLE HEADACHE, UNSPECIFIED CHRONICITY PATTERN, UNSPECIFIED HEADACHE TYPE: ICD-10-CM

## 2019-09-09 DIAGNOSIS — I10 ESSENTIAL HYPERTENSION: ICD-10-CM

## 2019-09-09 DIAGNOSIS — J31.0 CHRONIC RHINITIS: Primary | ICD-10-CM

## 2019-09-09 DIAGNOSIS — Z82.49 FAMILY HISTORY OF CEREBRAL ANEURYSM: ICD-10-CM

## 2019-09-09 PROCEDURE — 99214 PR OFFICE/OUTPT VISIT, EST, LEVL IV, 30-39 MIN: ICD-10-PCS | Mod: 25,S$GLB,, | Performed by: INTERNAL MEDICINE

## 2019-09-09 PROCEDURE — 99214 OFFICE O/P EST MOD 30 MIN: CPT | Mod: 25,S$GLB,, | Performed by: INTERNAL MEDICINE

## 2019-09-09 PROCEDURE — 96372 PR INJECTION,THERAP/PROPH/DIAG2ST, IM OR SUBCUT: ICD-10-PCS | Mod: S$GLB,,, | Performed by: INTERNAL MEDICINE

## 2019-09-09 PROCEDURE — 99999 PR PBB SHADOW E&M-EST. PATIENT-LVL V: ICD-10-PCS | Mod: PBBFAC,,, | Performed by: INTERNAL MEDICINE

## 2019-09-09 PROCEDURE — 99999 PR PBB SHADOW E&M-EST. PATIENT-LVL V: CPT | Mod: PBBFAC,,, | Performed by: INTERNAL MEDICINE

## 2019-09-09 PROCEDURE — 96372 THER/PROPH/DIAG INJ SC/IM: CPT | Mod: S$GLB,,, | Performed by: INTERNAL MEDICINE

## 2019-09-09 RX ORDER — TRIAMCINOLONE ACETONIDE 40 MG/ML
40 INJECTION, SUSPENSION INTRA-ARTICULAR; INTRAMUSCULAR
Status: COMPLETED | OUTPATIENT
Start: 2019-09-09 | End: 2019-09-09

## 2019-09-09 RX ORDER — MIRTAZAPINE 7.5 MG/1
7.5 TABLET, FILM COATED ORAL NIGHTLY
Qty: 30 TABLET | Refills: 1 | Status: SHIPPED | OUTPATIENT
Start: 2019-09-09 | End: 2019-11-14 | Stop reason: SDUPTHER

## 2019-09-09 RX ORDER — DOXYCYCLINE HYCLATE 100 MG
TABLET ORAL
Qty: 20 TABLET | Refills: 0 | Status: SHIPPED | OUTPATIENT
Start: 2019-09-09 | End: 2019-10-08

## 2019-09-09 RX ADMIN — TRIAMCINOLONE ACETONIDE 40 MG: 40 INJECTION, SUSPENSION INTRA-ARTICULAR; INTRAMUSCULAR at 03:09

## 2019-09-10 ENCOUNTER — PATIENT MESSAGE (OUTPATIENT)
Dept: ADMINISTRATIVE | Facility: OTHER | Age: 64
End: 2019-09-10

## 2019-09-12 ENCOUNTER — PATIENT OUTREACH (OUTPATIENT)
Dept: OTHER | Facility: OTHER | Age: 64
End: 2019-09-12

## 2019-09-12 NOTE — LETTER
September 12, 2019     Dayana Pina [place]  [address] [place] [address]       Dear Dayana,    [facility] Our goal is to make care effective, proactive and convenient by using data you send us from home to better treat your chronic conditions.          My name is Yulissa Johnson, and I am your dedicated Digital Medicine clinician. As an expert in medication management, I will help ensure that the medications you are taking continue to provide the intended benefits and help you reach your goals. You can reach me directly at [phone] or by sending me a message directly through your [facility] account.      I am Cynthia Grant and I will be your health . My job is to help you identify lifestyle changes to improve your disease control. We will talk about nutrition, exercise, and other ways you may be able to adjust your current habits to better your health. Additionally, we will help ensure you are completing the tests and screenings that are necessary to help manage your conditions. You can reach me directly at  or by sending me a message directly through your [facility] account.    Most importantly, YOU are at the center of this team. Together, we will work to improve your overall health and encourage you to meet your goals for a healthier lifestyle.     What we expect from YOU:  · Please take frequent home blood pressure measurements. We ask that you take at least 1 blood pressure reading per week, but more information will better help us get you know you. Be sure you rest for a few minutes before taking the reading in a quiet, comfortable place.     Be available to receive phone calls or [facility] messages, when appropriate, from your care team. Please let us know if there are any specific days or times that work best for us to reach you via phone.     Complete routine tests and screenings. Dont worry, we will help keep you on track!           What you should  expect from your Digital Medicine Care Team:   We will work with you to create a personalized plan of care and provide you with encouragement and education, including regarding lifestyle changes, that could help you manage your disease states.     We will adjust your current medications, if needed, and continue to monitor your long-term progress.     We will provide you and your physician with monthly progress reports after you have been in the program for more than 30 days.     We will send you reminders through [facility] and text messages to help ensure you do not miss any testing deadlines to help manage your disease states.    You will be able to reach us by phone or through your [facility] account by clicking our names under Care Team on the right side of the home screen.    I look forward to working with you to achieve your blood pressure goals!    We look forward to working with you to help manage your health,    Sincerely,    Your Digital Medicine Team    Please visit our websites to learn more:   · Hypertension: [email or web address]      Remember, we are not available for emergencies. If you have an emergency, please contact your doctors office directly or call [facility] on-call ([phone] or [phone]) or [phone].

## 2019-09-12 NOTE — PROGRESS NOTES
Digital Medicine: Health  Introduction    Introduced Dayana Su to Digital Medicine. Discussed health  role and recommended lifestyle modifications.    The history is provided by the patient.     HYPERTENSION  Our goal is to get BP to consistently below 130/80mmHg and make the process convenient so patient can avoid extra trips to the office. Getting your blood pressure below 130/80mmHg (definition of control) will reduce your risk for heart attack, kidney failure, stroke and death (as well as kidney failure, eye disease, & dementia)      Reviewed non-pharmacologic therapies and impact on BP.      Explained that we expect patient to obtain several blood pressures per week at random times of day.  Instructed patient not to allow anyone else to use phone and monitoring device.  Confirmed appropriate BP monitoring technique.      Explained to patient that the digital medicine team is not available for emergencies.  Patient will call [facility] on-call ([phone] or [phone]) or [phone] if needed.      Patient's BP goal is 130/80.Patient's BP average is 141/76 mmHg, which is above goal, per 2017 ACC/AHA Hypertension Guidelines.  When asked what the patient thinks is causing BP to be elevated, they state: lack of exercise and anxiety about elevated readings.       Last 5 Patient Entered Readings                                      Current 30 Day Average: 141/76     Recent Readings 9/12/2019 9/12/2019 9/11/2019 9/11/2019 9/11/2019    SBP (mmHg) 132 156 168 162 146    DBP (mmHg) 80 80 85 83 76    Pulse 63 54 61 61 70                Diet:   Patient reports eating or drinking the following: water and fresh vegetablesShe has the following dietary restrictions: none    The patient states that she typically eats a non-home cooked meal (ex: dining out, take out, frozen meals) 1-2 times per week.  She cooks for self.    Patient does the shopping for groceries.  She gets groceries from the grocery store.       Intervention(s): reducing sodium intake and increasing water intake    Assigning the following patient goals: maintain low sodium diet    Physical Activity:   When asked if exercising, patient responded: no    Patient stated that she has not been exercising recently but would like to get back into it. She thinks that may have a role in why her pressure has been elevated.     Intervention(s): goal tracking     Medication Adherence:       Patient is not on any blood pressure medication but did wake up this morning with a headache. Patient denied any other symptoms of high blood pressure, no chest pain, shortness of breath, or palpitations.     Tobacco and Alcohol:       Deferred.     Patient is not eligible for referral to smoking cessation.        [facility]    INTERVENTION(S)  recommended diet modifications and recommend physical activity    PLAN  patient verbalizes understanding and demonstrates understanding via teach back      There are no preventive care reminders to display for this patient.    Reviewed the importance of self-monitoring, medication adherence, and that the health  can be used as a resource for lifestyle modifications to help reduce or maintain a healthy lifestyle.    Sent link to [facility] [employer] webpages and my contact information via [employer] for future questions. Follow up scheduled.

## 2019-09-12 NOTE — PROGRESS NOTES
Digital Medicine Program Enrollment      Our goal is to get BP to consistently below 130/80mmHg and make the process convenient so patient can avoid extra trips to the office. Getting your blood pressure below 130/80mmHg (definition of control) will reduce your risk for heart attack, kidney failure, stroke and death (as well as kidney failure, eye disease, & dementia)      Reviewed that the Digital Medicine care team - consisting of a clinician and a health  - will follow the most current evidence-based national guidelines for treating your condition.  The health  will focus on lifestyle modifications and motivation while the clinician will focus on medication therapy.  The care team will review all data on a regular basis and reach out as needed.      Explained that one of the key parts of the program is communication with the care team.  Asked patient to respond to outreach attempts and complete questionnaires.  Stressed importance of medication adherence.      Explained that we expect patient to obtain several blood pressures per week at random times of day.  Instructed patient not to allow anyone else to use phone and monitoring device.  Confirmed appropriate BP monitoring technique.      Explained to patient that the digital medicine team is not available for emergencies.  Patient will call [facility] on-call ([phone] or [phone]) or [phone] if needed.

## 2019-09-12 NOTE — LETTER
September 12, 2019     Dayana Pina [place]  [address] [place] [address]       Dear Dayana,    [facility] to [facility] Our goal is to make care effective, proactive and convenient by using data you send us from home to better treat your chronic conditions.          My name is Yulissa Johnson, and I am your dedicated Digital Medicine clinician. As an expert in medication management, I will help ensure that the medications you are taking continue to provide the intended benefits and help you reach your goals. You can reach me directly at [phone] or by sending me a message directly through your [facility] account.      I am Cynthia Grant and I will be your health . My job is to help you identify lifestyle changes to improve your disease control. We will talk about nutrition, exercise, and other ways you may be able to adjust your current habits to better your health. Additionally, we will help ensure you are completing the tests and screenings that are necessary to help manage your conditions. You can reach me directly at  or by sending me a message directly through your [facility] account.    Most importantly, YOU are at the center of this team. Together, we will work to improve your overall health and encourage you to meet your goals for a healthier lifestyle.     What we expect from YOU:  · Please take frequent home blood pressure measurements. We ask that you take at least 1 blood pressure reading per week, but more information will better help us get you know you. Be sure you rest for a few minutes before taking the reading in a quiet, comfortable place.     Be available to receive phone calls or [facility] messages, when appropriate, from your care team. Please let us know if there are any specific days or times that work best for us to reach you via phone.     Complete routine tests and screenings. Dont worry, we will help keep you on track!           What you should  expect from your Digital Medicine Care Team:   We will work with you to create a personalized plan of care and provide you with encouragement and education, including regarding lifestyle changes, that could help you manage your disease states.     We will adjust your current medications, if needed, and continue to monitor your long-term progress.     We will provide you and your physician with monthly progress reports after you have been in the program for more than 30 days.     We will send you reminders through [facility] and text messages to help ensure you do not miss any testing deadlines to help manage your disease states.    You will be able to reach us by phone or through your [facility] account by clicking our names under Care Team on the right side of the home screen.    I look forward to working with you to achieve your blood pressure goals!    We look forward to working with you to help manage your health,    Sincerely,    Your Digital Medicine Team    Please visit our websites to learn more:   · Hypertension: [email or web address]      Remember, we are not available for emergencies. If you have an emergency, please contact your doctors office directly or call [facility] on-call ([phone] or [phone]) or [phone].

## 2019-09-15 ENCOUNTER — PATIENT MESSAGE (OUTPATIENT)
Dept: ALLERGY | Facility: CLINIC | Age: 64
End: 2019-09-15

## 2019-09-23 NOTE — PROGRESS NOTES
Subjective:       Patient ID: Dayana Su is a 63 y.o. female.    Chief Complaint: Follow-up    HPIPt with family hx of cerebral aneurysm - age 38 sister.  Has elevated BP today - has cough and sinus congestion no fever of chills.  BP has been elevated - needs something for sleep.  Review of Systems   Respiratory: Negative for shortness of breath (PND or orthopnea).    Cardiovascular: Negative for chest pain (arm pain or jaw pain).   Gastrointestinal: Negative for abdominal pain, diarrhea, nausea and vomiting.   Genitourinary: Negative for dysuria.   Neurological: Negative for seizures, syncope and headaches.       Objective:      Physical Exam   Constitutional: She is oriented to person, place, and time. She appears well-developed and well-nourished. No distress.   HENT:   Head: Normocephalic.   Mouth/Throat: Oropharynx is clear and moist.   Neck: Neck supple. No JVD present. No thyromegaly present.   Cardiovascular: Normal rate, regular rhythm, normal heart sounds and intact distal pulses. Exam reveals no gallop and no friction rub.   No murmur heard.  Pulmonary/Chest: Effort normal and breath sounds normal. She has no wheezes. She has no rales.   Abdominal: Soft. Bowel sounds are normal. She exhibits no distension and no mass. There is no tenderness. There is no rebound and no guarding.   Musculoskeletal: She exhibits no edema.   Lymphadenopathy:     She has no cervical adenopathy.   Neurological: She is alert and oriented to person, place, and time. She has normal reflexes.   Skin: Skin is warm and dry.   Psychiatric: She has a normal mood and affect. Her behavior is normal. Judgment and thought content normal.       Assessment:       1. Chronic rhinitis    2. Essential hypertension    3. Family history of cerebral aneurysm    4. Nonintractable headache, unspecified chronicity pattern, unspecified headache type        Plan:   Chronic rhinitis  -     Ambulatory consult to Allergy    Essential  hypertension  -     Hypertension Digital Medicine ([facility]) Enrollment Order  -     Hypertension Digital Medicine ([facility]): Assign Onboarding Questionnaires    Family history of cerebral aneurysm    Nonintractable headache, unspecified chronicity pattern, unspecified headache type    Other orders  -     doxycycline (VIBRA-TABS) 100 MG tablet; One tablet twice daily with food and a full glass of water  Dispense: 20 tablet; Refill: 0  -     triamcinolone acetonide injection 40 mg  -     mirtazapine (REMERON) 7.5 MG Tab; Take 1 tablet (7.5 mg total) by mouth every evening.  Dispense: 30 tablet; Refill: 1 - for insomnia

## 2019-10-08 ENCOUNTER — PATIENT OUTREACH (OUTPATIENT)
Dept: OTHER | Facility: OTHER | Age: 64
End: 2019-10-08

## 2019-10-08 NOTE — PROGRESS NOTES
Digital Medicine: Clinician Introduction    Dayana Su is a 63 y.o. female who is newly enrolled in the Digital Medicine Clinic.    The following information was reviewed and updated:  [facility] pharmacy   [facility] #[id number] [address] AT [facility] [address] [id number] [facility] [place]  [facility] [address]  Phone: [phone] Fax: [phone]      Review of patient's allergies indicates:   Allergen Reactions    Pcn [penicillins] Hives       Called patient to introduce myself as clinical pharmacist for the Digital medicine Hypertension program. Dayana Su submitted a reading of 155/113 at 10/8/2019 12:19 PM. She denies any s/sx of hypertension - no SOB, CP, HA, or blurred vision. Patient admits that she ate sushi with soy sauce, BBQ potato chips and skinny popcorn on last night which she attributes to today's high reading. She is currently not on any blood pressure medication and has been managing her blood pressure through her diet. Patient expressed remorse for high reading due to poor diet and states she will avoid these food items in the future.     The history is provided by the patient. No  was used.     HYPERTENSION  Our goal is to get BP to consistently below 130/80mmHg and make the process convenient so patient can avoid extra trips to the office. Getting your blood pressure below 130/80mmHg (definition of control) will reduce your risk for heart attack, kidney failure, stroke and death (as well as kidney failure, eye disease, & dementia)      Reviewed non-pharmacologic therapies and impact on BP      Explained that we expect patient to obtain several blood pressures per week at random times of day.  Instructed patient not to allow anyone else to use phone and monitoring device.  Confirmed appropriate BP monitoring technique.      Explained to patient that the digital medicine team is not available for emergencies.  Patient  will call [facility] on-call ([phone] or [phone]) or 911 if needed.    Patient's BP goal is 130/80. Patients BP average is 140/78 mmHg, which is above goal, per 2017 ACC/AHA Hypertension Guidelines.    Clinician received high BP alert.    Patient is not experiencing symptoms.      Med Review complete.    Allergies reviewed.      Last 5 Patient Entered Readings                                      Current 30 Day Average: 140/78     Recent Readings 10/8/2019 10/6/2019 10/4/2019 10/4/2019 10/1/2019    SBP (mmHg) 155 146 135 121 131    DBP (mmHg) 113 80 72 70 76    Pulse 59 62 68 60 66                Sleep Apnea  Patient not previously diagnosed with HPAM and         Medication Adherence:     Patient is currently not on any regimen for her hypertension.      INTERVENTION(S)  recommended diet modifications, recommended physical activity, encouragement/support and goal setting    PLAN  patient verbalizes understanding and patient amenable to changes    Current 30-day BP average of 140/78 is uncontrolled, does not meet goal of <130/80.  Reviewed readings. Trending downward. DBP consistently controlled prior to today's elevated reading. SBP intermittently controlled.   Patient would like to continue controlling BP based on TLC. Now understands the importance of a low sodium diet.  Continue to monitor readings for improvements.  No medication recommendations at this time.     Follow-up in 4 weeks.       There are no preventive care reminders to display for this patient.    Current Medication Regimen:        Reviewed the importance of self-monitoring, medication adherence, and that the health  can be used as a resource for lifestyle modifications to help reduce or maintain a healthy lifestyle.    Sent link to [facility] webpages and my contact information via [employer] for future questions. Follow up scheduled.

## 2019-10-10 ENCOUNTER — PATIENT OUTREACH (OUTPATIENT)
Dept: OTHER | Facility: OTHER | Age: 64
End: 2019-10-10

## 2019-10-10 NOTE — PROGRESS NOTES
"Digital Medicine: Health  Follow-Up    The history is provided by the patient.     Follow Up  Follow-up reason(s): goal follow-up      Routine Education Topics: eating patterns and physical activity  Inquired about high reading on 10/8/19 of 155/113. Patient reported that she ate sushi and most of a large sized bag of potato chips. She said that she "felt it was too much salt".           Diet:   Patient reports eating or drinking the following: fruit, water, fresh vegetables and protein, beans, saladsShe has the following dietary restrictions: noneShe cooks for self.    Patient does the shopping for groceries.  She gets groceries from the grocery store.      Eating style: eats beyond full    Barriers to a Healthy Diet: no barriers to healthy eating    Patient states that she does not eat "a lot" of food. She said that she eats salads, beans, and salmon but does sometimes use salt when she cooks for herself. Patient stated that sometimes when she eats a snack, she will keep eating beyond when she should stop.     Intervention(s): portion control    Assigning the following patient goals: reduce portions and maintain low sodium diet    Physical Activity:   When asked if exercising, patient responded: yes4 day(s) a week.  Her level of intensity when exercising is low.    Patient participates in the following activities: walking    She identified the following barriers to physical activity: weather    Patient stated that she has still been going for her walks throughout the week. Sometimes she walks outside in the evenings/nights and other times, she will go walk around the mall.     Intervention(s): goal tracking and behavior change     Assigning the following patient goal(s): participate in exercise weekly    Tobacco and Alcohol:       Deferred.    Patient is not eligible for referral to smoking cessation.        [facility]    INTERVENTION(S)  recommended diet modifications, recommend physical activity, " encouragement/support and goal setting    PLAN  patient verbalizes understanding and patient amenable to changes    Encouraged patient to reduce and monitor her sodium intake. Encouraged patient to start reading food labels and check for sodium content on salad dressings and suggested she use salt-free/low-salt seasoning blends instead of [employer]. Will send resources via email per request. Encouraged patient to try walking before or after [place] since there is a park with an indoor gym near her [place].        There are no preventive care reminders to display for this patient.    Last 5 Patient Entered Readings                                      Current 30 Day Average: 139/77     Recent Readings 10/10/2019 10/10/2019 10/9/2019 10/9/2019 10/8/2019    SBP (mmHg) 111 113 140 139 137    DBP (mmHg) 71 75 75 79 71    Pulse 64 67 77 56 68

## 2019-11-07 ENCOUNTER — PATIENT OUTREACH (OUTPATIENT)
Dept: OTHER | Facility: OTHER | Age: 64
End: 2019-11-07

## 2019-11-07 NOTE — PROGRESS NOTES
Digital Medicine: Health  Follow-Up    The history is provided by the patient.     Follow Up  Follow-up reason(s): goal follow-up    Patient stated that she has been experiencing a lot of personal stress dealing with friend and family illness. Patient stated that when she is outside of her comfort zone (at home), she does not so well with what she eats. She also stated that she finds her BP readings to be elevated when she is stressed. I informed and encouraged patient that elevated readings when stressed are typical. This seemed to calm her down a little. Patient then informed me that she has just purchased some containers to take her own home-cooked food with her while she sits with her friend at the hospital to avoid eating food high in sodium.   Will follow up with patient in a few weeks to make sure she is feeling okay. Encouraged patient to take time out to care for herself to ensure her health. Patient agreed.       Intervention/Plan    There are no preventive care reminders to display for this patient.    Last 5 Patient Entered Readings                                      Current 30 Day Average: 131/75     Recent Readings 11/7/2019 11/6/2019 11/6/2019 11/6/2019 11/5/2019    SBP (mmHg) 134 129 144 163 109    DBP (mmHg) 76 68 85 84 68    Pulse 58 66 57 60 60                  Screenings    SDOH

## 2019-11-14 RX ORDER — MIRTAZAPINE 7.5 MG/1
TABLET, FILM COATED ORAL
Qty: 30 TABLET | Refills: 0 | Status: SHIPPED | OUTPATIENT
Start: 2019-11-14 | End: 2020-03-13

## 2019-12-02 ENCOUNTER — PATIENT OUTREACH (OUTPATIENT)
Dept: OTHER | Facility: OTHER | Age: 64
End: 2019-12-02

## 2019-12-02 NOTE — PROGRESS NOTES
Digital Medicine: Health  Follow-Up    The history is provided by the patient.     Follow Up  Follow-up reason(s): goal follow-up    Called patient to follow up with her about connectivity issues with her BP cuff. Patient has gotten a new phone and has been experiencing issues sending her BP readings to us. Instructed her to log in to her [facility] jf, but she was unable to because she did not know her log in credentials. Coached patient through the steps of resetting her password in order to log into [facility]. We were able to get her into the jf but were unable to figure out why her [employer] jf and [facility] were not in sync. Patient stated that her Bluetooth was connected but was unsure of how to connect the apps to each other. Will place a task for our Tech Team to reach out to her to see if this issue can be resolved.       Intervention/Plan    There are no preventive care reminders to display for this patient.    Last 5 Patient Entered Readings                                      Current 30 Day Average: 134/77     Recent Readings 11/13/2019 11/13/2019 11/13/2019 11/12/2019 11/12/2019    SBP (mmHg) 155 165 138 148 145    DBP (mmHg) 86 89 81 78 79    Pulse 58 58 61 64 57                  Screenings    SDOH

## 2019-12-10 ENCOUNTER — PATIENT OUTREACH (OUTPATIENT)
Dept: OTHER | Facility: OTHER | Age: 64
End: 2019-12-10

## 2019-12-10 ENCOUNTER — PATIENT MESSAGE (OUTPATIENT)
Dept: ADMINISTRATIVE | Facility: OTHER | Age: 64
End: 2019-12-10

## 2019-12-10 NOTE — PROGRESS NOTES
Digital Medicine: Health  Follow-Up    The history is provided by the patient.     Follow Up  Follow-up reason(s): goal follow-up    Patient stated that everything has been corrected with her BP cuff connectivity issues.  Patient stated that she has some eating out engagements this month and is not sure how to monitor her sodium intake while eating out. Encouraged patient to look at the restaurants' menus before going to the restaurant to decide what menu items may have less of a sodium content. Also encouraged patient to order food that is cooked when ordered and to ask if it can be prepared with less/no salt. Patient was receptive and said that she will try out some of these suggestions to help monitor her sodium intake while eating out.     Will follow up with patient in a few weeks.       INTERVENTION(S)  encouragement/support and goal setting    PLAN  patient verbalizes understanding and continue monitoring      There are no preventive care reminders to display for this patient.    Last 5 Patient Entered Readings                                      Current 30 Day Average: 134/80     Recent Readings 12/9/2019 12/9/2019 12/9/2019 12/9/2019 12/8/2019    SBP (mmHg) 165 169 158 144 126    DBP (mmHg) 87 90 84 77 64    Pulse 56 57 66 63 66                  Screenings    SDOH

## 2019-12-31 ENCOUNTER — PATIENT OUTREACH (OUTPATIENT)
Dept: OTHER | Facility: OTHER | Age: 64
End: 2019-12-31

## 2019-12-31 NOTE — PROGRESS NOTES
Digital Medicine: Health  Follow-Up    The history is provided by the patient.     Follow Up  Follow-up reason(s): goal follow-up    Patient stated that she feels good this morning. She said that she gets a slight headache when her BP his a little elevated but this does not happen often. When asked about her dietary habits over the holidays, patient said that she had some gumbo which she thinks elevated her BP earlier this week. Patient was pleased to see her readings trend back down since she had an elevated reading on 12/29/19.     Patient said that for the new year, she plans to exercise more because she knows that it is key to control her BP readings. I praised and encouraged her for this plan and we talked about the importance of exercising to control BP. Patient said that she would like to start exercising because she is not currently on any BP medication and does not want to be.       INTERVENTION(S)  encouragement/support    PLAN  patient verbalizes understanding and continue monitoring      There are no preventive care reminders to display for this patient.    Last 5 Patient Entered Readings                                      Current 30 Day Average: 136/79     Recent Readings 12/30/2019 12/30/2019 12/30/2019 12/29/2019 12/29/2019    SBP (mmHg) 135 139 148 132 152    DBP (mmHg) 71 85 85 74 86    Pulse 78 56 56 69 60                  Screenings    SDOH

## 2020-01-28 ENCOUNTER — PATIENT OUTREACH (OUTPATIENT)
Dept: OTHER | Facility: OTHER | Age: 65
End: 2020-01-28

## 2020-01-28 NOTE — PROGRESS NOTES
Digital Medicine: Health  Follow-Up    The history is provided by the patient.     Follow Up  Follow-up reason(s): goal follow-up    Patient said that she has been having a lot going on this week and has been kind of off track with her exercise routine. She said that her daughter has recently had eye surgery and she is taking care of her for the time-being. Patient said that she will be busy next week as well so we plan to talk again in about 3 weeks. Encouraged patient that her BP readings had been starting to trend down and that her average is close to goal at 132/77 mmHg. She was excited and said that she anticipates exercising again. Patient said that she is trying to watch what she eats while out and about during the day.       INTERVENTION(S)  encouragement/support    PLAN  patient verbalizes understanding and continue monitoring      There are no preventive care reminders to display for this patient.    Last 5 Patient Entered Readings                                      Current 30 Day Average: 132/77     Recent Readings 1/25/2020 1/24/2020 1/24/2020 1/24/2020 1/24/2020    SBP (mmHg) 146 135 151 145 145    DBP (mmHg) 76 79 78 74 72    Pulse 69 67 68 62 64                  Screenings    SDOH

## 2020-02-10 RX ORDER — TRAZODONE HYDROCHLORIDE 100 MG/1
TABLET ORAL
Qty: 30 TABLET | Refills: 6 | Status: SHIPPED | OUTPATIENT
Start: 2020-02-10 | End: 2020-09-18 | Stop reason: SDUPTHER

## 2020-02-18 ENCOUNTER — PATIENT OUTREACH (OUTPATIENT)
Dept: OTHER | Facility: OTHER | Age: 65
End: 2020-02-18

## 2020-03-13 RX ORDER — MIRTAZAPINE 7.5 MG/1
TABLET, FILM COATED ORAL
Qty: 30 TABLET | Refills: 2 | Status: SHIPPED | OUTPATIENT
Start: 2020-03-13 | End: 2021-02-09

## 2020-03-16 NOTE — PROGRESS NOTES
Digital Medicine: Health  Follow-Up    The history is provided by the patient.     Follow Up  Follow-up reason(s): reading review    Called patient to check in. She said that her recent readings were slightly elevated because she took some Mucinex D for sinus relief. Patient said that she noticed the elevation in her BP but denied experiencing any s/sx of Hypertension. Patient stated that she did not know what the s/sx of Hypertension were so we reviewed them. Patient verbalized her understanding and confirmed that she would pay attention for those symptoms. She said that she was on her way to  her grandchildren to watch over them while her son is at work today. Encouraged patient to be careful and to wash her hands frequently to protect herself from the Coronavirus. Patient was receptive and said that she planned to keep herself and her  safe during this time. Will follow up with patient in a few weeks.       Intervention/Plan    There are no preventive care reminders to display for this patient.    Last 5 Patient Entered Readings                                      Current 30 Day Average: 133/72     Recent Readings 3/15/2020 3/15/2020 3/12/2020 3/12/2020 3/11/2020    SBP (mmHg) 165 162 122 136 136    DBP (mmHg) 76 86 64 68 75    Pulse 66 67 76 64 65                  Screenings    SDOH

## 2020-03-30 ENCOUNTER — TELEPHONE (OUTPATIENT)
Dept: INTERNAL MEDICINE | Facility: CLINIC | Age: 65
End: 2020-03-30

## 2020-03-30 RX ORDER — BENZONATATE 100 MG/1
100 CAPSULE ORAL 3 TIMES DAILY PRN
Qty: 30 CAPSULE | Refills: 0 | Status: SHIPPED | OUTPATIENT
Start: 2020-03-30 | End: 2020-04-09

## 2020-03-30 RX ORDER — DOXYCYCLINE HYCLATE 100 MG
TABLET ORAL
Qty: 20 TABLET | Refills: 0 | Status: SHIPPED | OUTPATIENT
Start: 2020-03-30 | End: 2021-02-09

## 2020-03-30 RX ORDER — PROMETHAZINE HYDROCHLORIDE AND DEXTROMETHORPHAN HYDROBROMIDE 6.25; 15 MG/5ML; MG/5ML
SYRUP ORAL
Qty: 118 ML | Refills: 0 | Status: SHIPPED | OUTPATIENT
Start: 2020-03-30 | End: 2021-02-09

## 2020-03-30 NOTE — TELEPHONE ENCOUNTER
Pt states she is taking mucinx , cough medication , she states she is coughing up green mucus , she has no fever or chills , no body aches she is requesting antibiotic to help with the symptoms

## 2020-03-30 NOTE — TELEPHONE ENCOUNTER
----- Message from Peggy Friend sent at 3/30/2020  2:54 PM CDT -----  Contact: patient [phone]  Patient called with c/o sinus and chest congestion/ productive cough with dark mucous and no temp but patient feels she needs an antibiotic and has taken a Z-pack in the past. Please let pt know if you can order this.She has tried otc meds with no relief.      [id number] [place] [address] [place] [facility] [phone]

## 2020-04-14 ENCOUNTER — PATIENT OUTREACH (OUTPATIENT)
Dept: OTHER | Facility: OTHER | Age: 65
End: 2020-04-14

## 2020-05-12 RX ORDER — LORATADINE 10 MG/1
TABLET ORAL
Qty: 90 TABLET | Refills: 3 | Status: SHIPPED | OUTPATIENT
Start: 2020-05-12 | End: 2021-06-09 | Stop reason: SDUPTHER

## 2020-05-12 RX ORDER — ROSUVASTATIN CALCIUM 20 MG/1
TABLET, COATED ORAL
Qty: 90 TABLET | Refills: 3 | Status: SHIPPED | OUTPATIENT
Start: 2020-05-12 | End: 2020-09-16 | Stop reason: SDUPTHER

## 2020-08-24 ENCOUNTER — PATIENT OUTREACH (OUTPATIENT)
Dept: OTHER | Facility: OTHER | Age: 65
End: 2020-08-24

## 2020-09-16 NOTE — TELEPHONE ENCOUNTER
----- Message from Brooke Raygoza sent at 9/16/2020  2:39 PM CDT -----  Contact: Pt- [phone]  Type:  RX Refill Request    Who Called:  Pt    Refill or New Rx: refill    RX Name and Strength: rosuvastatin (CRESTOR) 20 MG tablet  traZODone (DESYREL) 100 MG tablet    Preferred Pharmacy with phone number: [facility]/pharmacy #[id number] [address] [phone] (Phone)  [phone] (Fax)    Would the patient rather a call back or a response via [facility]? Call    Best Call Back Number: [phone]

## 2020-09-17 ENCOUNTER — PATIENT OUTREACH (OUTPATIENT)
Dept: OTHER | Facility: OTHER | Age: 65
End: 2020-09-17

## 2020-09-17 RX ORDER — ROSUVASTATIN CALCIUM 20 MG/1
20 TABLET, COATED ORAL DAILY
Qty: 90 TABLET | Refills: 1 | Status: SHIPPED | OUTPATIENT
Start: 2020-09-17 | End: 2020-09-18 | Stop reason: SDUPTHER

## 2020-09-17 NOTE — PROGRESS NOTES
Digital Medicine: Health  Follow-Up    The history is provided by the patient.             Reason for review: Blood pressure at goal        Topics Covered on Call: physical activity and Diet    Additional Follow-up details: Patient's BP readings have been pretty well-controlled. Commended and encouraged patient to keep up the good work with her BP readings. Her current BP avg is below goal at 125/74 mmHg.     She said that sometimes when she takes her readings, she does not rest long enough before her readngs. Afterwards, she will sit and wait for longer and take a follow up reading. Her second readings are usually better than the first ones.           Diet-Change    Patient reports eating or drinking the following: The patient stated that she has not been eating out at all. She has been preparing her own food, and has been staying away from salty food.       Physical Activity-Change  4 day(s) a week.     She added The patient stated that she has been walking during the evenings. to Her physical activity routine.      Medication Adherence-Medication adherence was assessed.        Substance, Sleep, Stress-change  stress-  Details:  Intervention(s):    Sleep-assessed  Details:The patient stated that she has not been sleeping well.   Intervention(s):    Alcohol -  Details:  Intervention(s):    Tobacco-  Details:  Intervention(s):    Additional details:The patient stated that she falls asleep pretty late, in attempts to sleep later but it does not work. She experiences sleep disturbances. She will wake up at 3 am and then go back to sleep a few hours later and then is back up at 7 am. She has tried melatonin, but does not think it does anything for her. .         Continue current diet/physical activity routine.       Addressed patient questions and patient has my contact information if needed prior to next outreach. Patient verbalizes understanding.      Explained the importance of self-monitoring and medication  adherence. Encouraged the patient to communicate with their health  for lifestyle modifications to help improve or maintain a healthy lifestyle.            There are no preventive care reminders to display for this patient.    Last 5 Patient Entered Readings                                      Current 30 Day Average: 125/74     Recent Readings 9/16/2020 9/16/2020 9/16/2020 9/14/2020 9/14/2020    SBP (mmHg) 121 128 135 138 137    DBP (mmHg) 69 73 77 71 77    Pulse 63 58 59 66 66

## 2020-09-18 RX ORDER — TRAZODONE HYDROCHLORIDE 100 MG/1
100 TABLET ORAL NIGHTLY PRN
Qty: 90 TABLET | Refills: 0 | OUTPATIENT
Start: 2020-09-18

## 2020-09-24 RX ORDER — TRAZODONE HYDROCHLORIDE 100 MG/1
100 TABLET ORAL NIGHTLY
Qty: 30 TABLET | Refills: 1 | Status: SHIPPED | OUTPATIENT
Start: 2020-09-24 | End: 2020-12-01 | Stop reason: SDUPTHER

## 2020-10-07 ENCOUNTER — PATIENT MESSAGE (OUTPATIENT)
Dept: ADMINISTRATIVE | Facility: HOSPITAL | Age: 65
End: 2020-10-07

## 2020-10-26 ENCOUNTER — TELEPHONE (OUTPATIENT)
Dept: INTERNAL MEDICINE | Facility: CLINIC | Age: 65
End: 2020-10-26

## 2020-10-26 DIAGNOSIS — M89.9 DISORDER OF BONE: ICD-10-CM

## 2020-10-26 DIAGNOSIS — R73.9 HYPERGLYCEMIA: ICD-10-CM

## 2020-10-26 DIAGNOSIS — E78.5 HYPERLIPIDEMIA, UNSPECIFIED HYPERLIPIDEMIA TYPE: ICD-10-CM

## 2020-10-26 DIAGNOSIS — I10 ESSENTIAL HYPERTENSION: Primary | ICD-10-CM

## 2020-12-02 NOTE — PROGRESS NOTES
Digital Medicine: Clinician Follow-Up    Called Dayana Su for Digital Medicine Hypertension routine follow-up: BP avg 129/73. BP is trending upwards.    Patient admits to not eating right and not walking the way she had been over the past few weeks. She admits to indulging over the holidays with foods that she normally does not eat which resulted in the elevated readings. She plans to get back on track from the holidays with exercising and a low sodium diet.     The history is provided by the patient.      Review of patient's allergies indicates:   -- Pcn (penicillins) -- Hives  Follow-up reason(s): routine follow up.     Hypertension    Readings are trending up due to lifestyle change.          Last 5 Patient Entered Readings                                      Current 30 Day Average: 129/73     Recent Readings 12/1/2020 12/1/2020 11/29/2020 11/29/2020 11/29/2020    SBP (mmHg) 150 149 118 132 145    DBP (mmHg) 76 76 70 72 81    Pulse 61 63 69 69 65                 Depression Screening  Did not address depression screening.    Sleep Apnea Screening    Did not address sleep apnea screening.     Medication Affordability Screening  Did not address medication affordability screening.     Medication Adherence-Medication adherence was assessed.          ASSESSMENT(S)  Patients BP average is 129/73 mmHg, which is at goal. Patient's BP goal is less than or equal to 130/80.    Home BP readings meeting goal 47% of the time. Patient continuing to manage BP with diet and exercise alone. No medications suggested at this time. Continue for improvements with TLC.    F/u in 3 months.      Hypertension Plan  Continue current diet/physical activity routine.       Addressed patient questions and patient has my contact information if needed prior to next outreach. Patient verbalizes understanding.             There are no preventive care reminders to display for this patient.  There are no preventive care reminders to  display for this patient.

## 2020-12-07 ENCOUNTER — PATIENT OUTREACH (OUTPATIENT)
Dept: OTHER | Facility: OTHER | Age: 65
End: 2020-12-07

## 2020-12-07 RX ORDER — TRAZODONE HYDROCHLORIDE 100 MG/1
100 TABLET ORAL NIGHTLY
Qty: 30 TABLET | Refills: 1 | Status: SHIPPED | OUTPATIENT
Start: 2020-12-07 | End: 2020-12-09 | Stop reason: SDUPTHER

## 2020-12-10 RX ORDER — TRAZODONE HYDROCHLORIDE 100 MG/1
100 TABLET ORAL NIGHTLY
Qty: 30 TABLET | Refills: 1 | Status: SHIPPED | OUTPATIENT
Start: 2020-12-10 | End: 2021-03-08

## 2020-12-25 NOTE — PROGRESS NOTES
"Digital Medicine: Health  Follow-Up    The history is provided by the patient.     Follow Up  Follow-up reason(s): reading review      Readings are trending down due to lifestyle change.  Patient stated that she has been resting better lately. She usually sleeps until 5 am, but lately has been sleeping until 6-7 am. She mentioned that she has been resting more since quarantine, and has not been "on the go" as much as she used to. Commended and encouraged patient for remaining safe during this time and taking some time to rest. Patient was receptive. Encouraged patient on her downward trending readings. She was receptive. Patient's readings are now at goal and well-controlled with her avg being below goal.       INTERVENTION(S)  encouragement/support    PLAN  patient verbalizes understanding and continue monitoring      There are no preventive care reminders to display for this patient.    Last 5 Patient Entered Readings                                      Current 30 Day Average: 126/74     Recent Readings 5/21/2020 5/20/2020 5/20/2020 5/17/2020 5/15/2020    SBP (mmHg) 118 110 110 131 131    DBP (mmHg) 72 65 65 79 76    Pulse 71 62 65 63 72                      Diet Screening   She has the following dietary restrictions: noneShe does not skip meals regularly.  She has no standard fasting periods.      Patient did not have times when they could not afford food or ran out.      The patient states that she typically eats a non-home cooked meal (ex: dining out, take out, frozen meals) 0 times per week.  She cooks for self.    Patient does the shopping for groceries.  She gets groceries from the grocery store.      She does not find cooking difficult.      Barriers to a Healthy Diet: no barriers to healthy eating    Patient stated that she drinks coffee in the morning with a banana, but the past few days she has not had any coffee. Instead, she has had ambreen tea with lemon, honey, ACV, and cayenne pepper. Patient " "reported higher energy levels and better sleep. She also mentioned that she has been trying to eat "right" and stay away from a lot of meat lately.     Physical Activity Screening   When asked if exercising, patient responded: yesHer level of intensity when exercising is moderate.    Patient participates in the following activities: dancing and home videos    She identified the following barriers to physical activity: no barriers to being active    Patient reported following along with some [employer] exercise videos a few days during the week.     Intervention(s): goal tracking       SDOH  " 20

## 2020-12-28 NOTE — PROGRESS NOTES
Digital Medicine: Health  Follow-Up    The history is provided by the patient.             Reason for review: Blood pressure at goal        Topics Covered on Call: physical activity and Diet    Additional Follow-up details: The patient and I discussed her recent BP readings. The patient has been suffering with her allergies for a few days now, and has been having a headache. She is currently trying to  a new prescription for her sinuses/allergies.     The patient also mentioned that she has been eating a lot of gumbo lately.                 Diet-Change    Patient reports eating or drinking the following: The patient has been eating a lot of gumbo lately for the holiday. She said that she does not eat the meat she puts in it, but still consumes the juices. We discussed how the soup itself still carries sodium. She was receptive and said that she is almost out of gumbo, and plans to return to her normal eating habits soon.       Physical Activity-Change  She removed walking from Her physical activity.    Additional physical activity details: The patient has not been walking as much since the holidays have begun.      Medication Adherence-Medication adherence was assessed.        Substance, Sleep, Stress-No change  stress-  Details:  Intervention(s):    Sleep-  Details:  Intervention(s):    Alcohol -  Details:  Intervention(s):    Tobacco-  Details:  Intervention(s):    Additional details:The patient reported no new stress other than trying to  her new sinus/allergy prescription from the pharmacy. .         Additional monitoring needed.  Continue current diet/physical activity routine.       Addressed patient questions and patient has my contact information if needed prior to next outreach. Patient verbalizes understanding.      Explained the importance of self-monitoring and medication adherence. Encouraged the patient to communicate with their health  for lifestyle modifications to help  improve or maintain a healthy lifestyle.               There are no preventive care reminders to display for this patient.      Last 5 Patient Entered Readings                                      Current 30 Day Average: 130/73     Recent Readings 12/27/2020 12/27/2020 12/24/2020 12/22/2020 12/22/2020    SBP (mmHg) 131 136 134 131 134    DBP (mmHg) 77 68 74 74 66    Pulse 70 70 71 70 71

## 2021-01-19 ENCOUNTER — TELEPHONE (OUTPATIENT)
Dept: INTERNAL MEDICINE | Facility: CLINIC | Age: 66
End: 2021-01-19

## 2021-01-19 ENCOUNTER — PATIENT MESSAGE (OUTPATIENT)
Dept: INTERNAL MEDICINE | Facility: CLINIC | Age: 66
End: 2021-01-19

## 2021-01-19 DIAGNOSIS — E78.5 HYPERLIPIDEMIA, UNSPECIFIED HYPERLIPIDEMIA TYPE: ICD-10-CM

## 2021-01-19 DIAGNOSIS — R73.9 HYPERGLYCEMIA: ICD-10-CM

## 2021-01-19 DIAGNOSIS — K21.9 GASTROESOPHAGEAL REFLUX DISEASE, UNSPECIFIED WHETHER ESOPHAGITIS PRESENT: ICD-10-CM

## 2021-01-19 DIAGNOSIS — I10 ESSENTIAL HYPERTENSION: Primary | ICD-10-CM

## 2021-01-19 DIAGNOSIS — M89.9 DISORDER OF BONE: ICD-10-CM

## 2021-01-20 ENCOUNTER — TELEPHONE (OUTPATIENT)
Dept: INTERNAL MEDICINE | Facility: CLINIC | Age: 66
End: 2021-01-20

## 2021-01-22 LAB
25(OH)D3 SERPL-MCNC: 67 NG/ML (ref 30–100)
ALBUMIN SERPL-MCNC: 4 G/DL (ref 3.6–5.1)
ALBUMIN/GLOB SERPL: 1.6 (CALC) (ref 1–2.5)
ALP SERPL-CCNC: 45 U/L (ref 37–153)
ALT SERPL-CCNC: 16 U/L (ref 6–29)
AST SERPL-CCNC: 19 U/L (ref 10–35)
BASOPHILS # BLD AUTO: 147 CELLS/UL (ref 0–200)
BASOPHILS NFR BLD AUTO: 2.3 %
BILIRUB SERPL-MCNC: 0.5 MG/DL (ref 0.2–1.2)
BUN SERPL-MCNC: 6 MG/DL (ref 7–25)
BUN/CREAT SERPL: 8 (CALC) (ref 6–22)
CALCIUM SERPL-MCNC: 9.6 MG/DL (ref 8.6–10.4)
CHLORIDE SERPL-SCNC: 108 MMOL/L (ref 98–110)
CHOLEST SERPL-MCNC: 159 MG/DL
CHOLEST/HDLC SERPL: 3.5 (CALC)
CO2 SERPL-SCNC: 28 MMOL/L (ref 20–32)
CREAT SERPL-MCNC: 0.79 MG/DL (ref 0.5–0.99)
EOSINOPHIL # BLD AUTO: 1229 CELLS/UL (ref 15–500)
EOSINOPHIL NFR BLD AUTO: 19.2 %
ERYTHROCYTE [DISTWIDTH] IN BLOOD BY AUTOMATED COUNT: 13.2 % (ref 11–15)
GFRSERPLBLD MDRD-ARVRAT: 78 ML/MIN/1.73M2
GLOBULIN SER CALC-MCNC: 2.5 G/DL (CALC) (ref 1.9–3.7)
GLUCOSE SERPL-MCNC: 99 MG/DL (ref 65–99)
HBA1C MFR BLD: 5.7 % OF TOTAL HGB
HCT VFR BLD AUTO: 40.4 % (ref 35–45)
HDLC SERPL-MCNC: 46 MG/DL
HGB BLD-MCNC: 13 G/DL (ref 11.7–15.5)
LDLC SERPL CALC-MCNC: 92 MG/DL (CALC)
LYMPHOCYTES # BLD AUTO: 2771 CELLS/UL (ref 850–3900)
LYMPHOCYTES NFR BLD AUTO: 43.3 %
MCH RBC QN AUTO: 29.1 PG (ref 27–33)
MCHC RBC AUTO-ENTMCNC: 32.2 G/DL (ref 32–36)
MCV RBC AUTO: 90.6 FL (ref 80–100)
MONOCYTES # BLD AUTO: 582 CELLS/UL (ref 200–950)
MONOCYTES NFR BLD AUTO: 9.1 %
NEUTROPHILS # BLD AUTO: 1670 CELLS/UL (ref 1500–7800)
NEUTROPHILS NFR BLD AUTO: 26.1 %
NONHDLC SERPL-MCNC: 113 MG/DL (CALC)
PLATELET # BLD AUTO: 273 THOUSAND/UL (ref 140–400)
PMV BLD REES-ECKER: 11.3 FL (ref 7.5–12.5)
POTASSIUM SERPL-SCNC: 4.5 MMOL/L (ref 3.5–5.3)
PROT SERPL-MCNC: 6.5 G/DL (ref 6.1–8.1)
RBC # BLD AUTO: 4.46 MILLION/UL (ref 3.8–5.1)
SODIUM SERPL-SCNC: 144 MMOL/L (ref 135–146)
TRIGL SERPL-MCNC: 114 MG/DL
TSH SERPL-ACNC: 1.75 MIU/L (ref 0.4–4.5)
WBC # BLD AUTO: 6.4 THOUSAND/UL (ref 3.8–10.8)

## 2021-01-25 ENCOUNTER — HOSPITAL ENCOUNTER (OUTPATIENT)
Dept: RADIOLOGY | Facility: HOSPITAL | Age: 66
Discharge: HOME | End: 2021-01-25
Attending: INTERNAL MEDICINE
Payer: MEDICARE

## 2021-01-25 ENCOUNTER — OFFICE VISIT (OUTPATIENT)
Dept: INTERNAL MEDICINE | Facility: CLINIC | Age: 66
End: 2021-01-25
Payer: MEDICARE

## 2021-01-25 VITALS
WEIGHT: 142.88 LBS | HEIGHT: 61 IN | DIASTOLIC BLOOD PRESSURE: 80 MMHG | HEART RATE: 94 BPM | SYSTOLIC BLOOD PRESSURE: 130 MMHG | OXYGEN SATURATION: 97 % | BODY MASS INDEX: 26.98 KG/M2

## 2021-01-25 DIAGNOSIS — J01.00 ACUTE NON-RECURRENT MAXILLARY SINUSITIS: ICD-10-CM

## 2021-01-25 DIAGNOSIS — M81.0 AGE-RELATED OSTEOPOROSIS WITHOUT CURRENT PATHOLOGICAL FRACTURE: ICD-10-CM

## 2021-01-25 DIAGNOSIS — M89.9 DISORDER OF BONE: ICD-10-CM

## 2021-01-25 DIAGNOSIS — R05.9 COUGH: ICD-10-CM

## 2021-01-25 DIAGNOSIS — Z01.00 ROUTINE EYE EXAM: ICD-10-CM

## 2021-01-25 DIAGNOSIS — Z12.11 SCREENING FOR COLON CANCER: ICD-10-CM

## 2021-01-25 DIAGNOSIS — J32.4 CHRONIC PANSINUSITIS: ICD-10-CM

## 2021-01-25 DIAGNOSIS — Z12.31 SCREENING MAMMOGRAM, ENCOUNTER FOR: ICD-10-CM

## 2021-01-25 DIAGNOSIS — R05.9 COUGH: Primary | ICD-10-CM

## 2021-01-25 PROCEDURE — 99214 PR OFFICE/OUTPT VISIT, EST, LEVL IV, 30-39 MIN: ICD-10-PCS | Mod: S$PBB,,, | Performed by: INTERNAL MEDICINE

## 2021-01-25 PROCEDURE — 99214 OFFICE O/P EST MOD 30 MIN: CPT | Mod: S$PBB,,, | Performed by: INTERNAL MEDICINE

## 2021-01-25 PROCEDURE — 94640 AIRWAY INHALATION TREATMENT: CPT | Mod: PBBFAC,59

## 2021-01-25 PROCEDURE — 99215 OFFICE O/P EST HI 40 MIN: CPT | Mod: PBBFAC,25 | Performed by: INTERNAL MEDICINE

## 2021-01-25 PROCEDURE — 99999 PR PBB SHADOW E&M-EST. PATIENT-LVL V: CPT | Mod: PBBFAC,,, | Performed by: INTERNAL MEDICINE

## 2021-01-25 PROCEDURE — 90670 PCV13 VACCINE IM: CPT | Mod: PBBFAC

## 2021-01-25 PROCEDURE — 71046 X-RAY EXAM CHEST 2 VIEWS: CPT | Mod: 26,,, | Performed by: RADIOLOGY

## 2021-01-25 PROCEDURE — 99999 PR PBB SHADOW E&M-EST. PATIENT-LVL V: ICD-10-PCS | Mod: PBBFAC,,, | Performed by: INTERNAL MEDICINE

## 2021-01-25 PROCEDURE — G0009 ADMIN PNEUMOCOCCAL VACCINE: HCPCS | Mod: PBBFAC,59

## 2021-01-25 PROCEDURE — 71046 X-RAY EXAM CHEST 2 VIEWS: CPT | Mod: TC

## 2021-01-25 PROCEDURE — 96372 THER/PROPH/DIAG INJ SC/IM: CPT | Mod: PBBFAC

## 2021-01-25 PROCEDURE — 71046 XR CHEST PA AND LATERAL: ICD-10-PCS | Mod: 26,,, | Performed by: RADIOLOGY

## 2021-01-25 RX ORDER — PREDNISONE 20 MG/1
20 TABLET ORAL DAILY
Qty: 4 TABLET | Refills: 0 | Status: SHIPPED | OUTPATIENT
Start: 2021-01-25 | End: 2021-02-09

## 2021-01-25 RX ORDER — ALBUTEROL SULFATE 0.83 MG/ML
2.5 SOLUTION RESPIRATORY (INHALATION)
Status: COMPLETED | OUTPATIENT
Start: 2021-01-25 | End: 2021-01-25

## 2021-01-25 RX ORDER — FLUTICASONE PROPIONATE 110 UG/1
2 AEROSOL, METERED RESPIRATORY (INHALATION) 2 TIMES DAILY
Qty: 36 G | Refills: 1 | Status: SHIPPED | OUTPATIENT
Start: 2021-01-25 | End: 2021-03-05 | Stop reason: DRUGHIGH

## 2021-01-25 RX ORDER — BETAMETHASONE SODIUM PHOSPHATE AND BETAMETHASONE ACETATE 3; 3 MG/ML; MG/ML
12 INJECTION, SUSPENSION INTRA-ARTICULAR; INTRALESIONAL; INTRAMUSCULAR; SOFT TISSUE ONCE
Status: COMPLETED | OUTPATIENT
Start: 2021-01-25 | End: 2021-01-25

## 2021-01-25 RX ORDER — ALBUTEROL SULFATE 90 UG/1
2 AEROSOL, METERED RESPIRATORY (INHALATION) EVERY 4 HOURS PRN
Qty: 54 G | Refills: 1 | Status: SHIPPED | OUTPATIENT
Start: 2021-01-25 | End: 2021-12-13 | Stop reason: SDUPTHER

## 2021-01-25 RX ORDER — INHALER, ASSIST DEVICES
SPACER (EA) MISCELLANEOUS
Qty: 1 DEVICE | Refills: 0 | Status: SHIPPED | OUTPATIENT
Start: 2021-01-25 | End: 2023-11-24

## 2021-01-25 RX ORDER — AZITHROMYCIN 250 MG/1
TABLET, FILM COATED ORAL
Qty: 6 TABLET | Refills: 0 | Status: SHIPPED | OUTPATIENT
Start: 2021-01-25 | End: 2021-02-09

## 2021-01-25 RX ADMIN — BETAMETHASONE ACETATE AND BETAMETHASONE SODIUM PHOSPHATE 12 MG: 3; 3 INJECTION, SUSPENSION INTRA-ARTICULAR; INTRALESIONAL; INTRAMUSCULAR; SOFT TISSUE at 03:01

## 2021-01-25 RX ADMIN — ALBUTEROL SULFATE 2.5 MG: 2.5 SOLUTION RESPIRATORY (INHALATION) at 03:01

## 2021-01-28 ENCOUNTER — PATIENT OUTREACH (OUTPATIENT)
Dept: ADMINISTRATIVE | Facility: HOSPITAL | Age: 66
End: 2021-01-28

## 2021-02-01 ENCOUNTER — HOSPITAL ENCOUNTER (OUTPATIENT)
Dept: RADIOLOGY | Facility: HOSPITAL | Age: 66
Discharge: HOME | End: 2021-02-01
Attending: INTERNAL MEDICINE
Payer: MEDICARE

## 2021-02-01 VITALS — BODY MASS INDEX: 26.24 KG/M2 | HEIGHT: 61 IN | WEIGHT: 139 LBS

## 2021-02-01 DIAGNOSIS — J32.4 CHRONIC PANSINUSITIS: ICD-10-CM

## 2021-02-01 DIAGNOSIS — Z12.31 SCREENING MAMMOGRAM, ENCOUNTER FOR: ICD-10-CM

## 2021-02-01 PROCEDURE — 70486 CT MAXILLOFACIAL W/O DYE: CPT | Mod: 26,,, | Performed by: RADIOLOGY

## 2021-02-01 PROCEDURE — 77063 MAMMO DIGITAL SCREENING BILAT WITH TOMO: ICD-10-PCS | Mod: 26,,, | Performed by: RADIOLOGY

## 2021-02-01 PROCEDURE — 70486 CT MAXILLOFACIAL W/O DYE: CPT | Mod: TC

## 2021-02-01 PROCEDURE — 77067 MAMMO DIGITAL SCREENING BILAT WITH TOMO: ICD-10-PCS | Mod: 26,,, | Performed by: RADIOLOGY

## 2021-02-01 PROCEDURE — 70486 CT SINUSES WITHOUT CONTRAST: ICD-10-PCS | Mod: 26,,, | Performed by: RADIOLOGY

## 2021-02-01 PROCEDURE — 77063 BREAST TOMOSYNTHESIS BI: CPT | Mod: 26,,, | Performed by: RADIOLOGY

## 2021-02-01 PROCEDURE — 77067 SCR MAMMO BI INCL CAD: CPT | Mod: 26,,, | Performed by: RADIOLOGY

## 2021-02-01 PROCEDURE — 77067 SCR MAMMO BI INCL CAD: CPT | Mod: TC

## 2021-02-09 ENCOUNTER — OFFICE VISIT (OUTPATIENT)
Dept: INTERNAL MEDICINE | Facility: CLINIC | Age: 66
End: 2021-02-09
Payer: MEDICARE

## 2021-02-09 VITALS
DIASTOLIC BLOOD PRESSURE: 70 MMHG | SYSTOLIC BLOOD PRESSURE: 132 MMHG | HEIGHT: 61 IN | BODY MASS INDEX: 27.34 KG/M2 | HEART RATE: 75 BPM | OXYGEN SATURATION: 99 % | WEIGHT: 144.81 LBS

## 2021-02-09 DIAGNOSIS — J45.909 ASTHMA, UNSPECIFIED ASTHMA SEVERITY, UNSPECIFIED WHETHER COMPLICATED, UNSPECIFIED WHETHER PERSISTENT: Primary | ICD-10-CM

## 2021-02-09 PROCEDURE — 99999 PR PBB SHADOW E&M-EST. PATIENT-LVL IV: ICD-10-PCS | Mod: PBBFAC,,, | Performed by: INTERNAL MEDICINE

## 2021-02-09 PROCEDURE — 99214 OFFICE O/P EST MOD 30 MIN: CPT | Mod: PBBFAC | Performed by: INTERNAL MEDICINE

## 2021-02-09 PROCEDURE — 99999 PR PBB SHADOW E&M-EST. PATIENT-LVL IV: CPT | Mod: PBBFAC,,, | Performed by: INTERNAL MEDICINE

## 2021-02-09 PROCEDURE — 99213 PR OFFICE/OUTPT VISIT, EST, LEVL III, 20-29 MIN: ICD-10-PCS | Mod: S$PBB,,, | Performed by: INTERNAL MEDICINE

## 2021-02-09 PROCEDURE — 99213 OFFICE O/P EST LOW 20 MIN: CPT | Mod: S$PBB,,, | Performed by: INTERNAL MEDICINE

## 2021-02-09 RX ORDER — BUDESONIDE 0.25 MG/2ML
0.25 INHALANT ORAL DAILY
Qty: 30 VIAL | Refills: 0 | Status: SHIPPED | OUTPATIENT
Start: 2021-02-09 | End: 2021-10-04

## 2021-02-09 RX ORDER — AZITHROMYCIN 500 MG/1
500 TABLET, FILM COATED ORAL DAILY
Qty: 7 TABLET | Refills: 0 | Status: SHIPPED | OUTPATIENT
Start: 2021-02-09 | End: 2021-02-16

## 2021-02-19 RX ORDER — MONTELUKAST SODIUM 10 MG/1
10 TABLET ORAL DAILY
Qty: 90 TABLET | Refills: 3 | Status: SHIPPED | OUTPATIENT
Start: 2021-02-19 | End: 2022-02-15 | Stop reason: SDUPTHER

## 2021-03-03 ENCOUNTER — TELEPHONE (OUTPATIENT)
Dept: OPTOMETRY | Facility: CLINIC | Age: 66
End: 2021-03-03

## 2021-03-05 ENCOUNTER — OFFICE VISIT (OUTPATIENT)
Dept: ALLERGY | Facility: CLINIC | Age: 66
End: 2021-03-05
Payer: MEDICARE

## 2021-03-05 VITALS — BODY MASS INDEX: 27.47 KG/M2 | HEIGHT: 61 IN | WEIGHT: 145.5 LBS

## 2021-03-05 DIAGNOSIS — I10 ESSENTIAL HYPERTENSION: ICD-10-CM

## 2021-03-05 DIAGNOSIS — K21.9 GASTROESOPHAGEAL REFLUX DISEASE, UNSPECIFIED WHETHER ESOPHAGITIS PRESENT: ICD-10-CM

## 2021-03-05 DIAGNOSIS — J45.909 ASTHMA, UNSPECIFIED ASTHMA SEVERITY, UNSPECIFIED WHETHER COMPLICATED, UNSPECIFIED WHETHER PERSISTENT: Primary | ICD-10-CM

## 2021-03-05 DIAGNOSIS — R05.9 COUGH: ICD-10-CM

## 2021-03-05 DIAGNOSIS — J32.9 FREQUENT SINUS INFECTIONS: ICD-10-CM

## 2021-03-05 PROCEDURE — 99204 OFFICE O/P NEW MOD 45 MIN: CPT | Mod: S$PBB,,, | Performed by: STUDENT IN AN ORGANIZED HEALTH CARE EDUCATION/TRAINING PROGRAM

## 2021-03-05 PROCEDURE — 99214 OFFICE O/P EST MOD 30 MIN: CPT | Mod: PBBFAC,PO | Performed by: STUDENT IN AN ORGANIZED HEALTH CARE EDUCATION/TRAINING PROGRAM

## 2021-03-05 PROCEDURE — 99999 PR PBB SHADOW E&M-EST. PATIENT-LVL IV: CPT | Mod: PBBFAC,,, | Performed by: STUDENT IN AN ORGANIZED HEALTH CARE EDUCATION/TRAINING PROGRAM

## 2021-03-05 PROCEDURE — 99999 PR PBB SHADOW E&M-EST. PATIENT-LVL IV: ICD-10-PCS | Mod: PBBFAC,,, | Performed by: STUDENT IN AN ORGANIZED HEALTH CARE EDUCATION/TRAINING PROGRAM

## 2021-03-05 PROCEDURE — 99204 PR OFFICE/OUTPT VISIT, NEW, LEVL IV, 45-59 MIN: ICD-10-PCS | Mod: S$PBB,,, | Performed by: STUDENT IN AN ORGANIZED HEALTH CARE EDUCATION/TRAINING PROGRAM

## 2021-03-05 RX ORDER — BENZONATATE 200 MG/1
200 CAPSULE ORAL 3 TIMES DAILY PRN
COMMUNITY
Start: 2020-12-28 | End: 2022-04-22

## 2021-03-05 RX ORDER — FLUTICASONE PROPIONATE 50 MCG
2 SPRAY, SUSPENSION (ML) NASAL 2 TIMES DAILY
Qty: 31.6 ML | Refills: 5 | Status: SHIPPED | OUTPATIENT
Start: 2021-03-05 | End: 2021-08-02

## 2021-03-17 ENCOUNTER — TELEPHONE (OUTPATIENT)
Dept: ALLERGY | Facility: CLINIC | Age: 66
End: 2021-03-17

## 2021-04-14 RX ORDER — MIRTAZAPINE 7.5 MG/1
7.5 TABLET, FILM COATED ORAL NIGHTLY
COMMUNITY
Start: 2021-02-10 | End: 2021-04-14 | Stop reason: SDUPTHER

## 2021-04-15 ENCOUNTER — PATIENT MESSAGE (OUTPATIENT)
Dept: RESEARCH | Facility: HOSPITAL | Age: 66
End: 2021-04-15

## 2021-04-15 RX ORDER — MIRTAZAPINE 7.5 MG/1
7.5 TABLET, FILM COATED ORAL NIGHTLY
Qty: 30 TABLET | Refills: 6 | Status: SHIPPED | OUTPATIENT
Start: 2021-04-15 | End: 2021-10-04

## 2021-06-09 RX ORDER — ROSUVASTATIN CALCIUM 20 MG/1
20 TABLET, COATED ORAL DAILY
Qty: 90 TABLET | Refills: 1 | Status: SHIPPED | OUTPATIENT
Start: 2021-06-09 | End: 2022-02-15 | Stop reason: SDUPTHER

## 2021-06-09 RX ORDER — LORATADINE 10 MG/1
10 TABLET ORAL DAILY
Qty: 90 TABLET | Refills: 3 | Status: SHIPPED | OUTPATIENT
Start: 2021-06-09 | End: 2022-04-22 | Stop reason: SDUPTHER

## 2021-08-03 ENCOUNTER — PATIENT MESSAGE (OUTPATIENT)
Dept: INTERNAL MEDICINE | Facility: CLINIC | Age: 66
End: 2021-08-03

## 2021-08-05 ENCOUNTER — TELEPHONE (OUTPATIENT)
Dept: INTERNAL MEDICINE | Facility: CLINIC | Age: 66
End: 2021-08-05

## 2021-08-19 ENCOUNTER — PATIENT OUTREACH (OUTPATIENT)
Dept: ADMINISTRATIVE | Facility: HOSPITAL | Age: 66
End: 2021-08-19

## 2021-08-25 ENCOUNTER — LAB VISIT (OUTPATIENT)
Dept: PRIMARY CARE CLINIC | Facility: OTHER | Age: 66
End: 2021-08-25
Payer: MEDICARE

## 2021-08-25 DIAGNOSIS — R05.9 COUGH: ICD-10-CM

## 2021-08-25 PROCEDURE — U0003 INFECTIOUS AGENT DETECTION BY NUCLEIC ACID (DNA OR RNA); SEVERE ACUTE RESPIRATORY SYNDROME CORONAVIRUS 2 (SARS-COV-2) (CORONAVIRUS DISEASE [COVID-19]), AMPLIFIED PROBE TECHNIQUE, MAKING USE OF HIGH THROUGHPUT TECHNOLOGIES AS DESCRIBED BY CMS-2020-01-R: HCPCS | Performed by: INTERNAL MEDICINE

## 2021-08-26 LAB
SARS-COV-2 RNA RESP QL NAA+PROBE: NOT DETECTED
SARS-COV-2- CYCLE NUMBER: NORMAL

## 2021-09-22 ENCOUNTER — TELEPHONE (OUTPATIENT)
Dept: INTERNAL MEDICINE | Facility: CLINIC | Age: 66
End: 2021-09-22

## 2021-09-22 DIAGNOSIS — I10 ESSENTIAL HYPERTENSION: Primary | ICD-10-CM

## 2021-09-22 DIAGNOSIS — R73.9 HYPERGLYCEMIA: ICD-10-CM

## 2021-09-29 ENCOUNTER — TELEPHONE (OUTPATIENT)
Dept: INTERNAL MEDICINE | Facility: CLINIC | Age: 66
End: 2021-09-29

## 2021-09-30 LAB
ALBUMIN SERPL-MCNC: 4.3 G/DL (ref 3.6–5.1)
ALBUMIN/GLOB SERPL: 1.7 (CALC) (ref 1–2.5)
ALP SERPL-CCNC: 60 U/L (ref 37–153)
ALT SERPL-CCNC: 19 U/L (ref 6–29)
AST SERPL-CCNC: 22 U/L (ref 10–35)
BASOPHILS # BLD AUTO: 149 CELLS/UL (ref 0–200)
BASOPHILS NFR BLD AUTO: 2.1 %
BILIRUB SERPL-MCNC: 0.6 MG/DL (ref 0.2–1.2)
BUN SERPL-MCNC: 6 MG/DL (ref 7–25)
BUN/CREAT SERPL: 8 (CALC) (ref 6–22)
CALCIUM SERPL-MCNC: 9.8 MG/DL (ref 8.6–10.4)
CHLORIDE SERPL-SCNC: 105 MMOL/L (ref 98–110)
CO2 SERPL-SCNC: 28 MMOL/L (ref 20–32)
CREAT SERPL-MCNC: 0.75 MG/DL (ref 0.5–0.99)
EOSINOPHIL # BLD AUTO: 1108 CELLS/UL (ref 15–500)
EOSINOPHIL NFR BLD AUTO: 15.6 %
ERYTHROCYTE [DISTWIDTH] IN BLOOD BY AUTOMATED COUNT: 13.4 % (ref 11–15)
GLOBULIN SER CALC-MCNC: 2.6 G/DL (CALC) (ref 1.9–3.7)
GLUCOSE SERPL-MCNC: 92 MG/DL (ref 65–99)
HBA1C MFR BLD: 5.8 % OF TOTAL HGB
HCT VFR BLD AUTO: 43.2 % (ref 35–45)
HGB BLD-MCNC: 14 G/DL (ref 11.7–15.5)
LYMPHOCYTES # BLD AUTO: 2655 CELLS/UL (ref 850–3900)
LYMPHOCYTES NFR BLD AUTO: 37.4 %
MCH RBC QN AUTO: 29.2 PG (ref 27–33)
MCHC RBC AUTO-ENTMCNC: 32.4 G/DL (ref 32–36)
MCV RBC AUTO: 90.2 FL (ref 80–100)
MONOCYTES # BLD AUTO: 838 CELLS/UL (ref 200–950)
MONOCYTES NFR BLD AUTO: 11.8 %
NEUTROPHILS # BLD AUTO: 2350 CELLS/UL (ref 1500–7800)
NEUTROPHILS NFR BLD AUTO: 33.1 %
PLATELET # BLD AUTO: 314 THOUSAND/UL (ref 140–400)
PMV BLD REES-ECKER: 11.1 FL (ref 7.5–12.5)
POTASSIUM SERPL-SCNC: 4.5 MMOL/L (ref 3.5–5.3)
PROT SERPL-MCNC: 6.9 G/DL (ref 6.1–8.1)
RBC # BLD AUTO: 4.79 MILLION/UL (ref 3.8–5.1)
SODIUM SERPL-SCNC: 141 MMOL/L (ref 135–146)
WBC # BLD AUTO: 7.1 THOUSAND/UL (ref 3.8–10.8)

## 2021-10-04 ENCOUNTER — OFFICE VISIT (OUTPATIENT)
Dept: INTERNAL MEDICINE | Facility: CLINIC | Age: 66
End: 2021-10-04
Payer: MEDICARE

## 2021-10-04 VITALS
TEMPERATURE: 98 F | BODY MASS INDEX: 25.55 KG/M2 | HEART RATE: 66 BPM | SYSTOLIC BLOOD PRESSURE: 132 MMHG | WEIGHT: 138.88 LBS | OXYGEN SATURATION: 98 % | HEIGHT: 62 IN | DIASTOLIC BLOOD PRESSURE: 82 MMHG

## 2021-10-04 DIAGNOSIS — R73.9 HYPERGLYCEMIA: Primary | ICD-10-CM

## 2021-10-04 DIAGNOSIS — E08.9 DIABETES MELLITUS DUE TO UNDERLYING CONDITION WITHOUT COMPLICATION, WITHOUT LONG-TERM CURRENT USE OF INSULIN: ICD-10-CM

## 2021-10-04 DIAGNOSIS — Z12.11 SCREENING FOR COLON CANCER: ICD-10-CM

## 2021-10-04 PROCEDURE — 99999 PR PBB SHADOW E&M-EST. PATIENT-LVL IV: CPT | Mod: PBBFAC,,, | Performed by: INTERNAL MEDICINE

## 2021-10-04 PROCEDURE — 99214 PR OFFICE/OUTPT VISIT, EST, LEVL IV, 30-39 MIN: ICD-10-PCS | Mod: S$PBB,,, | Performed by: INTERNAL MEDICINE

## 2021-10-04 PROCEDURE — 93005 ELECTROCARDIOGRAM TRACING: CPT | Mod: PBBFAC | Performed by: INTERNAL MEDICINE

## 2021-10-04 PROCEDURE — 99214 OFFICE O/P EST MOD 30 MIN: CPT | Mod: S$PBB,,, | Performed by: INTERNAL MEDICINE

## 2021-10-04 PROCEDURE — 99214 OFFICE O/P EST MOD 30 MIN: CPT | Mod: PBBFAC | Performed by: INTERNAL MEDICINE

## 2021-10-04 PROCEDURE — 99999 PR PBB SHADOW E&M-EST. PATIENT-LVL IV: ICD-10-PCS | Mod: PBBFAC,,, | Performed by: INTERNAL MEDICINE

## 2021-10-04 PROCEDURE — 93010 EKG 12-LEAD: ICD-10-PCS | Mod: S$PBB,,, | Performed by: INTERNAL MEDICINE

## 2021-10-04 PROCEDURE — 93010 ELECTROCARDIOGRAM REPORT: CPT | Mod: S$PBB,,, | Performed by: INTERNAL MEDICINE

## 2021-10-04 RX ORDER — FLUTICASONE FUROATE 100 UG/1
1 POWDER RESPIRATORY (INHALATION) DAILY
Qty: 30 EACH | Refills: 6 | Status: SHIPPED | OUTPATIENT
Start: 2021-10-04 | End: 2022-11-30 | Stop reason: SDUPTHER

## 2021-10-04 RX ORDER — TRAZODONE HYDROCHLORIDE 100 MG/1
100 TABLET ORAL NIGHTLY
Qty: 90 TABLET | Refills: 1 | Status: SHIPPED | OUTPATIENT
Start: 2021-10-04 | End: 2022-04-18 | Stop reason: SDUPTHER

## 2021-10-25 ENCOUNTER — TELEPHONE (OUTPATIENT)
Dept: INTERNAL MEDICINE | Facility: CLINIC | Age: 66
End: 2021-10-25
Payer: MEDICARE

## 2021-11-01 ENCOUNTER — OFFICE VISIT (OUTPATIENT)
Dept: URGENT CARE | Facility: CLINIC | Age: 66
End: 2021-11-01
Payer: MEDICARE

## 2021-11-01 VITALS
SYSTOLIC BLOOD PRESSURE: 136 MMHG | WEIGHT: 138 LBS | DIASTOLIC BLOOD PRESSURE: 83 MMHG | HEART RATE: 58 BPM | HEIGHT: 62 IN | BODY MASS INDEX: 25.4 KG/M2 | TEMPERATURE: 98 F | RESPIRATION RATE: 18 BRPM | OXYGEN SATURATION: 98 %

## 2021-11-01 DIAGNOSIS — M79.605 LEFT LEG PAIN: ICD-10-CM

## 2021-11-01 DIAGNOSIS — M70.62 TROCHANTERIC BURSITIS OF LEFT HIP: Primary | ICD-10-CM

## 2021-11-01 PROCEDURE — 99204 PR OFFICE/OUTPT VISIT, NEW, LEVL IV, 45-59 MIN: ICD-10-PCS | Mod: S$GLB,,, | Performed by: FAMILY MEDICINE

## 2021-11-01 PROCEDURE — 99204 OFFICE O/P NEW MOD 45 MIN: CPT | Mod: S$GLB,,, | Performed by: FAMILY MEDICINE

## 2021-11-01 PROCEDURE — 73552 X-RAY EXAM OF FEMUR 2/>: CPT | Mod: LT,S$GLB,, | Performed by: RADIOLOGY

## 2021-11-01 PROCEDURE — 73552 XR FEMUR 2 VIEW LEFT: ICD-10-PCS | Mod: LT,S$GLB,, | Performed by: RADIOLOGY

## 2021-11-01 RX ORDER — DICLOFENAC SODIUM 10 MG/G
2 GEL TOPICAL 4 TIMES DAILY
Qty: 20 G | Refills: 0 | OUTPATIENT
Start: 2021-11-01 | End: 2022-08-16

## 2021-11-01 RX ORDER — IBUPROFEN 600 MG/1
600 TABLET ORAL EVERY 8 HOURS PRN
Qty: 30 TABLET | Refills: 0 | Status: SHIPPED | OUTPATIENT
Start: 2021-11-01 | End: 2021-11-11

## 2021-11-23 ENCOUNTER — PATIENT OUTREACH (OUTPATIENT)
Dept: OTHER | Facility: OTHER | Age: 66
End: 2021-11-23
Payer: MEDICARE

## 2021-12-03 ENCOUNTER — TELEPHONE (OUTPATIENT)
Dept: INTERNAL MEDICINE | Facility: CLINIC | Age: 66
End: 2021-12-03
Payer: MEDICARE

## 2021-12-10 RX ORDER — ALBUTEROL SULFATE 0.63 MG/3ML
0.63 SOLUTION RESPIRATORY (INHALATION) EVERY 6 HOURS PRN
COMMUNITY
End: 2021-12-10 | Stop reason: SDUPTHER

## 2021-12-10 NOTE — TELEPHONE ENCOUNTER
Care Due:                  Date            Visit Type   Department     Provider  --------------------------------------------------------------------------------                                             [facility] PRIMARY  Last Visit: 10-      None         CARE           Landy Marie  Next Visit: None Scheduled  None         None Found                                                            Last  Test          Frequency    Reason                     Performed    Due Date  --------------------------------------------------------------------------------    Lipid Panel.  12 months..  rosuvastatin.............  Not Found    Overdue    Powered by [employer] by [employer]. Reference number: [id number].   12/10/2021 4:34:25 PM CST

## 2021-12-10 NOTE — TELEPHONE ENCOUNTER
----- Message from Devora Nichols sent at 12/10/2021  9:56 AM CST -----  Regarding: medication request  Contact: [address] - [place] -[phone]  Requesting  a Rx for albuterol Solution .083 % in  the 3ml vial .  Please fax [phone]

## 2021-12-13 DIAGNOSIS — J01.00 ACUTE NON-RECURRENT MAXILLARY SINUSITIS: ICD-10-CM

## 2021-12-13 NOTE — TELEPHONE ENCOUNTER
----- Message from Estefani Nguyen sent at 12/13/2021 10:23 AM CST -----  [facility] is requesting to speak with the nurse today about getting this pt medication refilled.       albuterol (PROVENTIL/VENTOLIN HFA) 90 mcg/actuation inhaler    Gabrielle stated that this is the third time she has called about getting a script for this medication and supplies. A fax was sent on 12/8.       Phone [phone]  Fax [phone]

## 2021-12-13 NOTE — TELEPHONE ENCOUNTER
No new care gaps identified.  Powered by [employer]. Reference number: [id number].   12/13/2021 12:01:09 PM CST

## 2021-12-14 ENCOUNTER — TELEPHONE (OUTPATIENT)
Dept: INTERNAL MEDICINE | Facility: CLINIC | Age: 66
End: 2021-12-14
Payer: MEDICARE

## 2021-12-14 RX ORDER — ALBUTEROL SULFATE 0.63 MG/3ML
0.63 SOLUTION RESPIRATORY (INHALATION) EVERY 6 HOURS PRN
Qty: 75 ML | Refills: 3 | OUTPATIENT
Start: 2021-12-14 | End: 2022-08-16

## 2021-12-15 ENCOUNTER — TELEPHONE (OUTPATIENT)
Dept: INTERNAL MEDICINE | Facility: CLINIC | Age: 66
End: 2021-12-15
Payer: MEDICARE

## 2021-12-16 RX ORDER — ALBUTEROL SULFATE 90 UG/1
2 AEROSOL, METERED RESPIRATORY (INHALATION) EVERY 4 HOURS PRN
Qty: 54 G | Refills: 1 | Status: SHIPPED | OUTPATIENT
Start: 2021-12-16 | End: 2022-06-14 | Stop reason: SDUPTHER

## 2021-12-20 ENCOUNTER — TELEPHONE (OUTPATIENT)
Dept: INTERNAL MEDICINE | Facility: CLINIC | Age: 66
End: 2021-12-20
Payer: MEDICARE

## 2022-01-05 ENCOUNTER — PATIENT OUTREACH (OUTPATIENT)
Dept: OTHER | Facility: OTHER | Age: 67
End: 2022-01-05
Payer: MEDICARE

## 2022-02-01 NOTE — PROGRESS NOTES
"Digital Medicine: Health  Follow-Up    The history is provided by the patient.             Reviewed BP Readings. Patients BP average is 134/71 mmHg, which is not at goal. Patient's BP goal is less than 130/80 mmHg.             Topics Covered on Call: physical activity and Diet    Additional Follow-up details: Patient reports that she hasn't been her "best" recently. She shared that between traveling and the holidays she wasn't watching her diet or exercising regularly.                     Diet-Change    Patient reports eating or drinking the following: Patient states that she will start watching her sodium intake.       Physical Activity-Change      Additional physical activity details: Patient has a goal to start exercising regularly. She has a fitbit that tracks her steps to keep her motivated.    Encouraged patient to set specific wellness goals that could help her towards her goal. Patient expressed verbal understanding.                Addressed patient questions and patient has my contact information if needed prior to next outreach.   Explained the importance of self-monitoring and medication adherence. Encouraged the patient to communicate with their health  for lifestyle modifications to help improve or maintain a healthy lifestyle.            There are no preventive care reminders to display for this patient.       Last 5 Patient Entered Readings                Current 30 Day Average: 134/71  Recent Readings 1/30/2022 1/28/2022 1/28/2022 1/28/2022 1/21/2022   SBP (mmHg) 146 139 141 150 134   DBP (mmHg) 71 74 69 73 69   Pulse 60 62 63 65 64                    "

## 2022-02-15 ENCOUNTER — TELEPHONE (OUTPATIENT)
Dept: INTERNAL MEDICINE | Facility: CLINIC | Age: 67
End: 2022-02-15

## 2022-02-15 DIAGNOSIS — R73.9 HYPERGLYCEMIA: ICD-10-CM

## 2022-02-15 DIAGNOSIS — E78.5 HYPERLIPIDEMIA, UNSPECIFIED HYPERLIPIDEMIA TYPE: ICD-10-CM

## 2022-02-15 DIAGNOSIS — R94.31 ABNORMAL ELECTROCARDIOGRAM (ECG) (EKG): ICD-10-CM

## 2022-02-15 DIAGNOSIS — I10 ESSENTIAL HYPERTENSION: Primary | ICD-10-CM

## 2022-02-15 NOTE — TELEPHONE ENCOUNTER
----- Message from Katherine Matos sent at 2/15/2022  2:55 PM CST -----  Contact: [phone]  Kristie mar/ [facility] called to advise this pt will be switching over to the mail in pharmacy and needs a refill on the following medications. Please Advise     Requesting an RX refill or new RX.  Is this a refill or new RX: refill  RX name and strength (copy/paste from chart):  rosuvastatin (CRESTOR) 20 MG tablet  Is this a 30 day or 90 day RX: 90 day  Pharmacy name and phone # (copy/paste from chart):        [facility] [address] AT [address] to [place] [address]  [place] [id number]  Phone: [phone] Fax: [phone]      The doctors have asked that we provide their patients with the following 2 reminders -- prescription refills can take up to 72 hours, and a friendly reminder that in the future you can use your [facility] account to request refills: pharmacy call        Requesting an RX refill or new RX.  Is this a refill or new RX: refill  RX name and strength (copy/paste from chart):montelukast (SINGULAIR) 10 mg tablet    Is this a 30 day or 90 day RX: 90 day  Pharmacy name and phone # (copy/paste from chart):        [facility] [address] AT [address] to [facility]  [address]  [facility] [address]  Phone: [phone] Fax: [phone]      The doctors have asked that we provide their patients with the following 2 reminders -- prescription refills can take up to 72 hours, and a friendly reminder that in the future you can use your [facility] account to request refills:       Requesting an RX refill or new RX.  Is this a refill or new RX: refill  RX name and strength (copy/paste from chart):  rosuvastatin (CRESTOR) 20 MG tablet  Is this a 30 day or 90 day RX: 90 day  Pharmacy name and phone # (copy/paste from chart):        [facility] - [place], [place] -  [address] AT [address] to [facility]  [address]  [facility] [address]  Phone: [phone] Fax: [phone]      The doctors have asked that we provide their patients with the following 2 reminders -- prescription refills can take up to 72 hours, and a friendly reminder that in the future you can use your [facility] account to request refills:

## 2022-02-15 NOTE — TELEPHONE ENCOUNTER
Care Due:                  Date            Visit Type   Department     Provider  --------------------------------------------------------------------------------                                EP -                              PRIMARY      [facility] PRIMARY  Last Visit: 10-      CARE ([facility])   CARE           Landy Marie  Next Visit: None Scheduled  None         None Found                                                            Last  Test          Frequency    Reason                     Performed    Due Date  --------------------------------------------------------------------------------    Lipid Panel.  12 months..  rosuvastatin.............  01- 01-    Powered by [employer]. Reference number: [id number].   2/15/2022 4:03:43 PM CST

## 2022-02-16 RX ORDER — MONTELUKAST SODIUM 10 MG/1
10 TABLET ORAL DAILY
Qty: 90 TABLET | Refills: 3 | Status: SHIPPED | OUTPATIENT
Start: 2022-02-16

## 2022-02-16 RX ORDER — ROSUVASTATIN CALCIUM 20 MG/1
20 TABLET, COATED ORAL DAILY
Qty: 90 TABLET | Refills: 1 | Status: SHIPPED | OUTPATIENT
Start: 2022-02-16 | End: 2022-02-24 | Stop reason: SDUPTHER

## 2022-02-22 ENCOUNTER — TELEPHONE (OUTPATIENT)
Dept: INTERNAL MEDICINE | Facility: CLINIC | Age: 67
End: 2022-02-22

## 2022-02-22 NOTE — TELEPHONE ENCOUNTER
----- Message from Pauline Husain sent at 2/22/2022  3:58 PM CST -----  Contact: [phone]  Requesting an RX refill or new RX.  Is this a refill or new RX: refill  RX name and strength (copy/paste from chart): rosuvastatin (CRESTOR) 20 MG tablet   Is this a 30 day or 90 day RX: 90  Pharmacy name and phone # (copy/paste from chart):      [facility] - [place], [place] - [address] AT [address] to [facility]  [address]  [facility] [address]  Phone: [phone] Fax: [phone]     The doctors have asked that we provide their patients with the following 2 reminders -- prescription refills can take up to 72 hours, and a friendly reminder that in the future you can use your [facility] account to request refills: ypt states this was sent in and she still does not have this she states it must have got caught up somewhere and she states she is all the way out.. please give a return call so she can explain all of this

## 2022-02-24 ENCOUNTER — PATIENT MESSAGE (OUTPATIENT)
Dept: INTERNAL MEDICINE | Facility: CLINIC | Age: 67
End: 2022-02-24
Payer: MEDICARE

## 2022-02-24 RX ORDER — ROSUVASTATIN CALCIUM 20 MG/1
20 TABLET, COATED ORAL DAILY
Qty: 90 TABLET | Refills: 1 | Status: SHIPPED | OUTPATIENT
Start: 2022-02-24 | End: 2022-04-22

## 2022-02-24 NOTE — TELEPHONE ENCOUNTER
No new care gaps identified.  Powered by [employer] by [employer]. Reference number: [id number].   2/24/2022 5:20:23 PM CST

## 2022-03-07 ENCOUNTER — PATIENT MESSAGE (OUTPATIENT)
Dept: ADMINISTRATIVE | Facility: OTHER | Age: 67
End: 2022-03-07
Payer: MEDICARE

## 2022-03-31 ENCOUNTER — PATIENT MESSAGE (OUTPATIENT)
Dept: ADMINISTRATIVE | Facility: OTHER | Age: 67
End: 2022-03-31
Payer: MEDICARE

## 2022-04-18 RX ORDER — TRAZODONE HYDROCHLORIDE 100 MG/1
100 TABLET ORAL NIGHTLY
Qty: 90 TABLET | Refills: 1 | Status: SHIPPED | OUTPATIENT
Start: 2022-04-18 | End: 2022-11-14

## 2022-04-18 NOTE — TELEPHONE ENCOUNTER
Care Due:                  Date            Visit Type   Department     Provider  --------------------------------------------------------------------------------                                EP -                              PRIMARY      [facility] PRIMARY  Last Visit: 10-      CARE ([facility])   CARE           Landy Marie                              [facility] PRIMARY  Next Visit: 04-      CARE ([facility])   [facility]           Landy Marie                                                            Last  Test          Frequency    Reason                     Performed    Due Date  --------------------------------------------------------------------------------    Lipid Panel.  12 months..  rosuvastatin.............  01- 01-    Powered by [employer] by [employer]. Reference number: [id number].   4/18/2022 2:39:19 PM CDT

## 2022-04-18 NOTE — TELEPHONE ENCOUNTER
----- Message from Sada Strickland sent at 4/18/2022 11:28 AM CDT -----  Contact: [phone]  Requesting an RX refill or new RX.  Is this a refill or new RX: refill  RX name and strength (copy/paste from chart):  traZODone (DESYREL) 100 MG tablet  Is this a 30 day or 90 day RX: 90  Pharmacy name and phone # (copy/paste from chart):    [facility] #[id number] [place], [place] - [address] [address]  Phone: [phone] Fax: [phone]            The doctors have asked that we provide their patients with the following 2 reminders -- prescription refills can take up to 72 hours, and a friendly reminder that in the future you can use your [facility] account to request refills: yes

## 2022-04-22 ENCOUNTER — OFFICE VISIT (OUTPATIENT)
Dept: INTERNAL MEDICINE | Facility: CLINIC | Age: 67
End: 2022-04-22
Payer: MEDICARE

## 2022-04-22 VITALS
HEART RATE: 58 BPM | WEIGHT: 137.81 LBS | SYSTOLIC BLOOD PRESSURE: 130 MMHG | TEMPERATURE: 98 F | DIASTOLIC BLOOD PRESSURE: 80 MMHG | HEIGHT: 62 IN | OXYGEN SATURATION: 99 % | BODY MASS INDEX: 25.36 KG/M2

## 2022-04-22 DIAGNOSIS — Z82.49 FAMILY HISTORY OF CEREBRAL ANEURYSM: ICD-10-CM

## 2022-04-22 DIAGNOSIS — R35.0 URINARY FREQUENCY: ICD-10-CM

## 2022-04-22 DIAGNOSIS — M79.10 MYALGIA: ICD-10-CM

## 2022-04-22 DIAGNOSIS — Z12.31 SCREENING MAMMOGRAM, ENCOUNTER FOR: ICD-10-CM

## 2022-04-22 DIAGNOSIS — Z12.11 SCREENING FOR COLON CANCER: ICD-10-CM

## 2022-04-22 DIAGNOSIS — R73.9 HYPERGLYCEMIA: Primary | ICD-10-CM

## 2022-04-22 DIAGNOSIS — R94.6 ABNORMAL RESULTS OF THYROID FUNCTION STUDIES: ICD-10-CM

## 2022-04-22 PROCEDURE — 99215 OFFICE O/P EST HI 40 MIN: CPT | Mod: PBBFAC | Performed by: INTERNAL MEDICINE

## 2022-04-22 PROCEDURE — 99999 PR PBB SHADOW E&M-EST. PATIENT-LVL V: ICD-10-PCS | Mod: PBBFAC,,, | Performed by: INTERNAL MEDICINE

## 2022-04-22 PROCEDURE — 99999 PR PBB SHADOW E&M-EST. PATIENT-LVL V: CPT | Mod: PBBFAC,,, | Performed by: INTERNAL MEDICINE

## 2022-04-22 PROCEDURE — 99214 PR OFFICE/OUTPT VISIT, EST, LEVL IV, 30-39 MIN: ICD-10-PCS | Mod: S$PBB,,, | Performed by: INTERNAL MEDICINE

## 2022-04-22 PROCEDURE — 99214 OFFICE O/P EST MOD 30 MIN: CPT | Mod: S$PBB,,, | Performed by: INTERNAL MEDICINE

## 2022-04-22 RX ORDER — LORATADINE 10 MG/1
10 TABLET ORAL DAILY
Qty: 90 TABLET | Refills: 3 | Status: SHIPPED | OUTPATIENT
Start: 2022-04-22

## 2022-04-22 RX ORDER — ESTRADIOL 0.1 MG/G
CREAM VAGINAL
Qty: 42.5 G | Refills: 2 | Status: SHIPPED | OUTPATIENT
Start: 2022-04-22 | End: 2023-11-24

## 2022-04-22 RX ORDER — ATORVASTATIN CALCIUM 20 MG/1
20 TABLET, FILM COATED ORAL DAILY
Qty: 90 TABLET | Refills: 3 | Status: SHIPPED | OUTPATIENT
Start: 2022-04-22 | End: 2023-05-02

## 2022-04-22 NOTE — PROGRESS NOTES
Subjective:       Patient ID: Dayana Su is a 66 y.o. female.    Chief Complaint: Follow-up    HPIPt is feeling well - No CP or SOB.  Crestor was giving her muscle pain - wants to try a different statin.    Review of Systems   Respiratory: Negative for shortness of breath (PND or orthopnea).    Cardiovascular: Negative for chest pain (arm pain or jaw pain).   Gastrointestinal: Negative for abdominal pain, diarrhea, nausea and vomiting.   Genitourinary: Negative for dysuria.   Neurological: Negative for seizures, syncope and headaches.       Objective:      Physical Exam  Constitutional:       General: She is not in acute distress.     Appearance: She is well-developed.   HENT:      Head: Normocephalic.   Neck:      Thyroid: No thyromegaly.      Vascular: No JVD.   Cardiovascular:      Rate and Rhythm: Normal rate and regular rhythm.      Heart sounds: Normal heart sounds. No murmur heard.    No friction rub. No gallop.   Pulmonary:      Effort: Pulmonary effort is normal.      Breath sounds: Normal breath sounds. No wheezing or rales.   Abdominal:      General: Bowel sounds are normal. There is no distension.      Palpations: Abdomen is soft. There is no mass.      Tenderness: There is no abdominal tenderness. There is no guarding or rebound.   Musculoskeletal:      Cervical back: Neck supple.   Lymphadenopathy:      Cervical: No cervical adenopathy.   Skin:     General: Skin is warm and dry.   Neurological:      Mental Status: She is alert and oriented to person, place, and time.      Deep Tendon Reflexes: Reflexes are normal and symmetric.   Psychiatric:         Behavior: Behavior normal.         Thought Content: Thought content normal.         Judgment: Judgment normal.         Assessment:       1. Hyperglycemia    2. Family history of cerebral aneurysm    3. Urinary frequency    4. Abnormal results of thyroid function studies     5. Screening for colon cancer    6. Screening mammogram, encounter  for    7. Myalgia        Plan:   Hyperglycemia  -     Hemoglobin A1C; Future; Expected date: 04/22/2022  -     TSH; Future; Expected date: 04/22/2022  -     Hemoglobin A1C; Future; Expected date: 04/22/2022    Family history of cerebral aneurysm  -     Cancel: CTA Head and Neck (xpd); Future; Expected date: 04/22/2022  -     CTA Head; Future; Expected date: 04/22/2022    Urinary frequency  -     Urinalysis  -     Urine culture  -     Urinalysis; Future; Expected date: 04/22/2022  -     Urine culture; Future; Expected date: 04/22/2022    Abnormal results of thyroid function studies   -     TSH; Future; Expected date: 04/22/2022  -     TSH; Future; Expected date: 04/22/2022    Screening for colon cancer  -     Fecal Immunochemical Test (iFOBT); Future; Expected date: 04/22/2022    Screening mammogram, encounter for  -     Mammo Digital Screening Bilat w/ Alf; Future; Expected date: 10/22/2022    Myalgia  -     Sedimentation rate; Future; Expected date: 04/22/2022  -     C-Reactive Protein; Future; Expected date: 04/22/2022    Other orders  -     atorvastatin (LIPITOR) 20 MG tablet; Take 1 tablet (20 mg total) by mouth once daily.  Dispense: 90 tablet; Refill: 3  -     estradioL (ESTRACE) 0.01 % (0.1 mg/gram) vaginal cream; 1 g three times weekly  Dispense: 42.5 g; Refill: 2  -     loratadine (CLARITIN) 10 mg tablet; Take 1 tablet (10 mg total) by mouth once daily.  Dispense: 90 tablet; Refill: 3

## 2022-06-14 ENCOUNTER — PATIENT MESSAGE (OUTPATIENT)
Dept: INTERNAL MEDICINE | Facility: CLINIC | Age: 67
End: 2022-06-14
Payer: MEDICARE

## 2022-06-15 ENCOUNTER — PATIENT MESSAGE (OUTPATIENT)
Dept: ADMINISTRATIVE | Facility: CLINIC | Age: 67
End: 2022-06-15
Payer: MEDICARE

## 2022-06-15 ENCOUNTER — NURSE TRIAGE (OUTPATIENT)
Dept: ADMINISTRATIVE | Facility: CLINIC | Age: 67
End: 2022-06-15
Payer: MEDICARE

## 2022-06-15 ENCOUNTER — PATIENT MESSAGE (OUTPATIENT)
Dept: ADMINISTRATIVE | Facility: OTHER | Age: 67
End: 2022-06-15
Payer: MEDICARE

## 2022-06-15 ENCOUNTER — TELEPHONE (OUTPATIENT)
Dept: INTERNAL MEDICINE | Facility: CLINIC | Age: 67
End: 2022-06-15

## 2022-06-15 DIAGNOSIS — Z20.822 ENCOUNTER FOR LABORATORY TESTING FOR COVID-19 VIRUS: ICD-10-CM

## 2022-06-15 NOTE — TELEPHONE ENCOUNTER
----- Message from Katherine Joneschristiano sent at 6/15/2022  8:14 AM CDT -----  Contact: [phone]  Pt called to advise that she tested positive for C-19 and believes there is a medication she can take to help. She would like to have that medication sent to the following pharmacy. Please Advise       [facility]/pharmacy [address] [place] [address]  Phone: [phone] Fax: [phone]    Requesting an RX refill or new RX.  Is this a refill or new RX: refill  RX name and strength (copy/paste from chart):  fluticasone propionate (FLONASE) 50 mcg/actuation nasal spray  Is this a 30 day or 90 day RX: 90 day   Pharmacy name and phone # (copy/paste from chart):    [facility]/pharmacy [address] [place] [address]  Phone: [phone] Fax: [phone]  The doctors have asked that we provide their patients with the following 2 reminders -- prescription refills can take up to 72 hours, and a friendly reminder that in the future you can use your [facility] account to request refills: aware      Requesting an RX refill or new RX.  Is this a refill or new RX: refill  RX name and strength (copy/paste from chart): albuterol (PROVENTIL/VENTOLIN HFA) 90 mcg/actuation inhaler   Is this a 30 day or 90 day RX: 90 day   Pharmacy name and phone # (copy/paste from chart):    [facility]/pharmacy [address] [place] [address]  Phone: [phone] Fax: [phone]  The doctors have asked that we provide their patients with the following 2 reminders -- prescription refills can take up to 72 hours, and a friendly reminder that in the future you can use your [facility] account to request refills: aware

## 2022-06-15 NOTE — TELEPHONE ENCOUNTER
Pt is not SOB - paxlovid sent - COVID surveillance set up.  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    Please place her on the nurse schedule for a swab on Monday June 20.

## 2022-06-15 NOTE — TELEPHONE ENCOUNTER
1st enrollment call attempted with contact made. Consent signed. Pt states that she took atorvastatin medication today at 0800, and was prescribed paxlovid today. Pt would like to know if it is ok to start medication today. Pt unable to input VS for evening will start tomorrow    Spoke with Dr. Hurtado, states that should wait to start paxlovid medication tomorrow, to make sure the atorvastatin is out of her system.      Called patient to review COVID-19 Surveillance Program enrollment process.    Smartphone:yes    [facility] jf:yes    Program consent:yes    Pulse oximeter status:yes    Verified emergency contacts:yes    Program Overview: Reviewed , no response process, and importance of correct emergency contacts in event that well-being check is warranted. Patient will call [phone] 24/7 to speak with an OnCall nurse, if needed. Pt aware that if Sp02 less than or equal to 93% or if they have any worsening symptoms, they need to go to the emergency department. If they are having a medical emergency, they will call 911. Otherwise, patient will continue to submit data as scheduled. Reviewed importance of wearing mask if self or family members leave the house.     Patient had no further questions.    Sp02:    Pulse:    Temperature:    SOB at rest (0-5):    SOB with activity (0-5):    Worsening symptoms:    Fever symptoms:    Increased home oxygen:    Reason for Disposition   Health Information question, no triage required and triager able to answer question    Protocols used: INFORMATION ONLY CALL - NO [facility]

## 2022-06-16 ENCOUNTER — PATIENT MESSAGE (OUTPATIENT)
Dept: ADMINISTRATIVE | Facility: OTHER | Age: 67
End: 2022-06-16
Payer: MEDICARE

## 2022-06-17 ENCOUNTER — PATIENT MESSAGE (OUTPATIENT)
Dept: ADMINISTRATIVE | Facility: OTHER | Age: 67
End: 2022-06-17
Payer: MEDICARE

## 2022-06-18 ENCOUNTER — NURSE TRIAGE (OUTPATIENT)
Dept: ADMINISTRATIVE | Facility: CLINIC | Age: 67
End: 2022-06-18
Payer: MEDICARE

## 2022-06-18 ENCOUNTER — PATIENT MESSAGE (OUTPATIENT)
Dept: ADMINISTRATIVE | Facility: OTHER | Age: 67
End: 2022-06-18
Payer: MEDICARE

## 2022-06-18 ENCOUNTER — PATIENT MESSAGE (OUTPATIENT)
Dept: ADMINISTRATIVE | Facility: CLINIC | Age: 67
End: 2022-06-18
Payer: MEDICARE

## 2022-06-18 NOTE — TELEPHONE ENCOUNTER
Pt called for no response to cc push.    Pt answered phone, and reminded to put in her vs into the jf. She agrees. No questions or concerns at this time. Denies needing any triage at this time. Instructed Pt to call ooc at [phone] if any questions or concerns arise. Pt agrees.      Reason for Disposition   Health Information question, no triage required and triager able to answer question    Protocols used: INFORMATION ONLY CALL - NO TRIAGE-A-

## 2022-06-18 NOTE — TELEPHONE ENCOUNTER
Pt called for no response to cc push.    VM left for Pt to put in vs into jf asap or call ooc at [phone] if wishing to speak with a nurse or if having a medical emergency to call 911 or go to the nearest ER.    Spoke to man at her home number and he said she was not available at this time. Instructed him to pass on above message. He agrees to do so.

## 2022-06-19 ENCOUNTER — PATIENT MESSAGE (OUTPATIENT)
Dept: ADMINISTRATIVE | Facility: OTHER | Age: 67
End: 2022-06-19
Payer: MEDICARE

## 2022-06-19 ENCOUNTER — NURSE TRIAGE (OUTPATIENT)
Dept: ADMINISTRATIVE | Facility: CLINIC | Age: 67
End: 2022-06-19
Payer: MEDICARE

## 2022-06-19 ENCOUNTER — PATIENT MESSAGE (OUTPATIENT)
Dept: ADMINISTRATIVE | Facility: CLINIC | Age: 67
End: 2022-06-19
Payer: MEDICARE

## 2022-06-19 NOTE — TELEPHONE ENCOUNTER
Surveillance pt escalated for no response.  Unable to contact pt. Unable to leave VM. In[employer] System[employer]art message previously sent.    Reason for Disposition   Unable to complete triage due to phone connection issues    Additional Information   Negative: Caller is angry or rude (e.g., hangs up, verbally abusive, yelling)   Negative: Caller hangs up   Negative: Caller has already spoken with the PCP and has no further questions.   Negative: Caller has already spoken with another triager and has no further questions.   Negative: Caller has already spoken with another triager or PCP AND has further questions AND triager able to answer questions.   Negative: Busy signal.  First attempt to contact caller.  Follow-up call scheduled within 15 minutes.   Negative: No answer.  First attempt to contact caller.  Follow-up call scheduled within 15 minutes.   Negative: Message left on identified voice mail   Negative: Message left on unidentified voice mail.  Phone number verified.   Negative: Message left with person in household.   Negative: Wrong number reached.  Phone number verified.   Negative: Second attempt to contact caller AND no contact made. Phone number verified.   Negative: Third attempt to contact caller AND no contact made. Phone number verified.   Negative: Cell phone out of range.  Phone number verified.   Negative: Pager number given.  Answering service notified.   Negative: Patient already left for the hospital/clinic.   Negative: Caller has cancelled the call before the first contact    Protocols used: NO CONTACT OR DUPLICATE CONTACT CALL-A-

## 2022-06-20 ENCOUNTER — PATIENT MESSAGE (OUTPATIENT)
Dept: ADMINISTRATIVE | Facility: CLINIC | Age: 67
End: 2022-06-20
Payer: MEDICARE

## 2022-06-20 ENCOUNTER — PATIENT MESSAGE (OUTPATIENT)
Dept: ADMINISTRATIVE | Facility: OTHER | Age: 67
End: 2022-06-20
Payer: MEDICARE

## 2022-06-20 ENCOUNTER — LAB VISIT (OUTPATIENT)
Dept: LAB | Facility: HOSPITAL | Age: 67
End: 2022-06-20
Attending: INTERNAL MEDICINE
Payer: MEDICARE

## 2022-06-20 DIAGNOSIS — Z20.822 ENCOUNTER FOR LABORATORY TESTING FOR COVID-19 VIRUS: ICD-10-CM

## 2022-06-20 LAB — SARS-COV-2- CYCLE NUMBER: 24

## 2022-06-20 PROCEDURE — U0003 INFECTIOUS AGENT DETECTION BY NUCLEIC ACID (DNA OR RNA); SEVERE ACUTE RESPIRATORY SYNDROME CORONAVIRUS 2 (SARS-COV-2) (CORONAVIRUS DISEASE [COVID-19]), AMPLIFIED PROBE TECHNIQUE, MAKING USE OF HIGH THROUGHPUT TECHNOLOGIES AS DESCRIBED BY CMS-2020-01-R: HCPCS | Performed by: INTERNAL MEDICINE

## 2022-06-20 PROCEDURE — U0005 INFEC AGEN DETEC AMPLI PROBE: HCPCS | Performed by: INTERNAL MEDICINE

## 2022-06-21 ENCOUNTER — PATIENT MESSAGE (OUTPATIENT)
Dept: INTERNAL MEDICINE | Facility: CLINIC | Age: 67
End: 2022-06-21
Payer: MEDICARE

## 2022-06-21 ENCOUNTER — NURSE TRIAGE (OUTPATIENT)
Dept: ADMINISTRATIVE | Facility: CLINIC | Age: 67
End: 2022-06-21
Payer: MEDICARE

## 2022-06-21 ENCOUNTER — PATIENT MESSAGE (OUTPATIENT)
Dept: ADMINISTRATIVE | Facility: CLINIC | Age: 67
End: 2022-06-21
Payer: MEDICARE

## 2022-06-21 LAB — SARS-COV-2 RNA RESP QL NAA+PROBE: DETECTED

## 2022-06-21 NOTE — TELEPHONE ENCOUNTER
Surveillance pt escalated for no response.  Unable to reach pt.  Unable to leave VM.  [employer]case[employer] message previously sent.    Reason for Disposition   Unable to complete triage due to phone connection issues    Additional Information   Negative: Caller has already spoken with the PCP (or office), and has no further questions   Negative: Caller has already spoken with another triager and has no further questions   Negative: Caller has already spoken with another triager or PCP (or office), and has further questions and triager able to answer questions.   Negative: Busy signal.  First attempt to contact caller.  Follow-up call scheduled within 15 minutes.   Negative: No answer.  First attempt to contact caller.  Follow-up call scheduled within 15 minutes.   Negative: Message left on identified voicemail   Negative: Message left on unidentified voice mail. Phone number verified.   Negative: Message left with person in household   Negative: Wrong number reached. Phone number verified.   Negative: Second attempt to contact caller AND no contact made. Phone number verified.   Negative: Third attempt to contact caller AND no contact made. Phone number verified.   Negative: Cell phone out of range. Phone number verified.   Negative: Patient already left for the hospital/clinic   Negative: Caller has cancelled the call before the first contact    Protocols used: NO CONTACT OR DUPLICATE CONTACT CALL-A-[place]

## 2022-06-21 NOTE — TELEPHONE ENCOUNTER
Pt called and she didn't answer left VM and told to call OOC if any problems. Pt call for no response for surveillance. Will continue to monitor        dReason for Disposition   Message left on unidentified voice mail. Phone number verified.    Protocols used: NO CONTACT OR DUPLICATE CONTACT CALL-A-[place]

## 2022-06-21 NOTE — PROGRESS NOTES
Test is positive but may remain that way for three months even though you ar no longer infectious.    How are you feeling?

## 2022-06-22 ENCOUNTER — PATIENT MESSAGE (OUTPATIENT)
Dept: ADMINISTRATIVE | Facility: CLINIC | Age: 67
End: 2022-06-22
Payer: MEDICARE

## 2022-06-22 ENCOUNTER — NURSE TRIAGE (OUTPATIENT)
Dept: ADMINISTRATIVE | Facility: CLINIC | Age: 67
End: 2022-06-22
Payer: MEDICARE

## 2022-06-22 NOTE — TELEPHONE ENCOUNTER
Pt. escalated due ton o response. Pt called with contact made. Pt states that she will input VS, and denies further needs at this time.    Reason for Disposition   Health Information question, no triage required and triager able to answer question    Protocols used: INFORMATION ONLY CALL - NO TRIAGE-A-

## 2022-06-22 NOTE — TELEPHONE ENCOUNTER
Pt escalated for no response, NT unable to make phone contact. As per program protocol all contact numbers called once and my chart message sent.    NT did speak with  EC, Lucas hansen, who reports pt is doing better and is not currently with him.

## 2022-06-23 ENCOUNTER — PATIENT MESSAGE (OUTPATIENT)
Dept: ADMINISTRATIVE | Facility: CLINIC | Age: 67
End: 2022-06-23
Payer: MEDICARE

## 2022-06-23 ENCOUNTER — NURSE TRIAGE (OUTPATIENT)
Dept: ADMINISTRATIVE | Facility: CLINIC | Age: 67
End: 2022-06-23
Payer: MEDICARE

## 2022-06-23 NOTE — TELEPHONE ENCOUNTER
Pt called for no response and she said that she was back at work and that she is doing well and to Opt out of COVID Surveillance program. Pt instructed If any problems to reach out to OOC nurse   Reason for Disposition   Information only question and nurse able to answer    Protocols used: INFORMATION ONLY CALL - NO TRIAGE-A-[place]

## 2022-08-16 ENCOUNTER — HOSPITAL ENCOUNTER (EMERGENCY)
Facility: HOSPITAL | Age: 67
Discharge: HOME | End: 2022-08-16
Attending: EMERGENCY MEDICINE
Payer: MEDICARE

## 2022-08-16 VITALS
DIASTOLIC BLOOD PRESSURE: 73 MMHG | HEIGHT: 62 IN | BODY MASS INDEX: 24.84 KG/M2 | WEIGHT: 135 LBS | HEART RATE: 72 BPM | OXYGEN SATURATION: 99 % | RESPIRATION RATE: 16 BRPM | SYSTOLIC BLOOD PRESSURE: 146 MMHG | TEMPERATURE: 98 F

## 2022-08-16 DIAGNOSIS — J04.0 LARYNGITIS: ICD-10-CM

## 2022-08-16 DIAGNOSIS — J02.9 ACUTE PHARYNGITIS, UNSPECIFIED ETIOLOGY: Primary | ICD-10-CM

## 2022-08-16 DIAGNOSIS — J30.9 ALLERGIC RHINITIS, UNSPECIFIED SEASONALITY, UNSPECIFIED TRIGGER: ICD-10-CM

## 2022-08-16 LAB
CTP QC/QA: YES
POC RAPID STREP A: NEGATIVE
SARS-COV-2 RDRP RESP QL NAA+PROBE: NEGATIVE

## 2022-08-16 PROCEDURE — 99284 EMERGENCY DEPT VISIT MOD MDM: CPT | Mod: 25,ER

## 2022-08-16 PROCEDURE — U0002 COVID-19 LAB TEST NON-CDC: HCPCS | Mod: ER | Performed by: NURSE PRACTITIONER

## 2022-08-16 RX ORDER — DEXTROMETHORPHAN HYDROBROMIDE, GUAIFENESIN 5; 100 MG/5ML; MG/5ML
650 LIQUID ORAL EVERY 8 HOURS
Qty: 20 TABLET | Refills: 0 | Status: SHIPPED | OUTPATIENT
Start: 2022-08-16 | End: 2022-08-21

## 2022-08-16 RX ORDER — AZITHROMYCIN 250 MG/1
250 TABLET, FILM COATED ORAL DAILY
Qty: 6 TABLET | Refills: 0 | Status: SHIPPED | OUTPATIENT
Start: 2022-08-16 | End: 2023-03-03

## 2022-08-16 RX ORDER — BENZOCAINE AND MENTHOL 15; 3.6 MG/1; MG/1
1 LOZENGE ORAL 3 TIMES DAILY PRN
Qty: 30 LOZENGE | Refills: 0 | Status: SHIPPED | OUTPATIENT
Start: 2022-08-16 | End: 2022-08-21

## 2022-08-16 RX ORDER — IBUPROFEN 600 MG/1
600 TABLET ORAL EVERY 6 HOURS PRN
Qty: 20 TABLET | Refills: 0 | Status: SHIPPED | OUTPATIENT
Start: 2022-08-16 | End: 2022-08-21

## 2022-08-16 NOTE — Clinical Note
"Dayana"Ori Su was seen and treated in our emergency department on 8/16/2022.  She may return to work on 08/18/2022.       If you have any questions or concerns, please don't hesitate to call.      JORJE Zamorano"

## 2022-08-16 NOTE — DISCHARGE INSTRUCTIONS
As discussed, If symptoms do no improve by 8/21/2022; start Z-pack; return to the ED for any new or worsening symptoms

## 2022-08-16 NOTE — ED PROVIDER NOTES
Encounter Date: 2022    SCRIBE #1 NOTE: I, Shawna Lowe, am scribing for, and in the presence of,  JORJE Cardoza. I have scribed the following portions of the note - Other sections scribed: HPI, ROS, PE.       History     Chief Complaint   Patient presents with    Sore Throat     Patient presents to ED c/o sore throat, pt reports sore throat and cough x 3 days. Denies sob, chest pain, otc medication   Hx of elevated cholesterol      66 year old female with reported history of HLD presents to the ED with complaints of sore throat beginning three days ago. Pt notes associated symptom of a raspy voice change. Patient denies difficulty swallowing, throat swelling, rash, fever, chest pain, SOB, numbness, weakness, tingling, abdominal pain, back pain, dysuria, hematuria, nausea, vomiting, diarrhea, or any other complaints. She rates her pain as 8/10 and has not taken any medications for her symptoms today; no alleviating/aggravating factors. Pt states they take Atorvastatin for their HLD. Additionally, pt states they are vaccinated against Influenza and COVID-19 and attest to no sick contacts. Does not endorse smoking cigarettes, alcohol or drug use. Allergic to Penicilin.         The history is provided by the patient. No  was used.     Review of patient's allergies indicates:   Allergen Reactions    Pcn [penicillins] Hives     Past Medical History:   Diagnosis Date    H/O tubal ligation     H/O: hysterectomy     Hyperlipidemia      Past Surgical History:   Procedure Laterality Date    HYSTERECTOMY  1986    fibroids     OOPHORECTOMY      TUBAL LIGATION       Family History   Problem Relation Age of Onset    Heart disease Mother          at 46    Heart disease Father          at 67    Heart disease Sister          26    Heart disease Sister          33    Lupus Sister          at 40     Aneurysm Sister          at 38    Heart failure  Brother     Hypertension Sister     Cancer Neg Hx     Diabetes Neg Hx      Social History     Tobacco Use    Smoking status: Former Smoker     Packs/day: 0.25     Start date: 1970     Quit date: 2000     Years since quittin.3    Smokeless tobacco: Never Used   Substance Use Topics    Alcohol use: No    Drug use: No     Review of Systems   Constitutional: Negative for chills and fever.   HENT: Positive for sore throat and voice change (Raspy). Negative for congestion, ear pain, rhinorrhea and trouble swallowing.    Eyes: Negative for pain, discharge and redness.   Respiratory: Negative for cough and shortness of breath.    Cardiovascular: Negative for chest pain.   Gastrointestinal: Negative for abdominal pain, diarrhea, nausea and vomiting.   Genitourinary: Negative for decreased urine volume and dysuria.   Musculoskeletal: Negative for back pain, neck pain and neck stiffness.   Skin: Negative for rash.   Neurological: Negative for dizziness, weakness, light-headedness, numbness and headaches.   Psychiatric/Behavioral: Negative for confusion.       Physical Exam     Initial Vitals [22 1337]   BP Pulse Resp Temp SpO2   (!) 146/73 65 17 98.4 °F (36.9 °C) 100 %      MAP       --         Physical Exam    Nursing note and vitals reviewed.  Constitutional: Vital signs are normal. She appears well-developed.  Non-toxic appearance. She does not appear ill.   HENT:   Head: Normocephalic and atraumatic.   Right Ear: Tympanic membrane and ear canal normal.   Left Ear: Tympanic membrane and ear canal normal.   Nose: Mucosal edema present.   Mouth/Throat: Uvula is midline and mucous membranes are normal. No trismus in the jaw. Posterior oropharyngeal erythema present. No oropharyngeal exudate or posterior oropharyngeal edema.   Cervical lymphadenopathy. Erythematous oropharynx with no swelling or exudate. Mucosal edema.  Uvula midline. Tolerating oral secretions. No trismus; no Garett's angina   Eyes:  Conjunctivae are normal.   Neck: No Brudzinski's sign and no Kernig's sign noted.   Normal range of motion.  Cardiovascular: Normal rate and regular rhythm.   Pulmonary/Chest: Effort normal and breath sounds normal. She exhibits no tenderness.   Abdominal: Abdomen is soft. There is no abdominal tenderness.   Musculoskeletal:      Cervical back: Normal range of motion.     Lymphadenopathy:     She has cervical adenopathy.   Neurological: She is alert and oriented to person, place, and time. Gait normal. GCS eye subscore is 4. GCS verbal subscore is 5. GCS motor subscore is 6.   Skin: Skin is warm, dry and intact. No rash noted.   Psychiatric: She has a normal mood and affect. Her speech is normal and behavior is normal. Judgment and thought content normal.         ED Course   Procedures  Labs Reviewed   SARS-COV-2 RDRP GENE    Narrative:     This test utilizes isothermal nucleic acid amplification   technology to detect the SARS-CoV-2 RdRp nucleic acid segment.   The analytical sensitivity (limit of detection) is 125 genome   equivalents/mL.   A POSITIVE result implies infection with the SARS-CoV-2 virus;   the patient is presumed to be contagious.     A NEGATIVE result means that SARS-CoV-2 nucleic acids are not   present above the limit of detection. A NEGATIVE result should be   treated as presumptive. It does not rule out the possibility of   COVID-19 and should not be the sole basis for treatment decisions.   If COVID-19 is strongly suspected based on clinical and exposure   history, re-testing using an alternate molecular assay should be   considered.   This test is only for use under the Food and Drug   Administration s Emergency Use Authorization (EUA).   Commercial kits are provided by [employer].   Performance characteristics of the EUA have been independently   verified by [facility] Department of   Pathology and Laboratory Medicine.    _________________________________________________________________   The authorized Fact Sheet for Healthcare Providers and the authorized Fact   Sheet for Patients of the ID NOW COVID-19 are available on the FDA   website:     https://www.fda.gov/media/594268/download  https://www.fda.gov/media/118193/download          POCT STREP A MOLECULAR   POCT STREP A, RAPID          Imaging Results    None          Medications - No data to display        APC / Resident Notes:   This is an evaluation of a 66 y.o. female that presents to the Emergency Department for Sore throat, hoarse. The patient is a non-toxic, afebrile, and well appearing female. On physical exam: Ears: without infection.  Pharynx: Cervical lymphadenopathy. Erythematous oropharynx with no swelling or exudate. Mucosal edema.  Uvula midline. Tolerating oral secretions. No trismus; no Garett's angina. Appears well hydrated with moist mucus membranes. Neck soft and supple with no meningeal signs. Breath sounds are clear and equal bilaterally with no adventitious breath sounds, tachypnea or respiratory distress with room air pulse ox of 100% and no evidence of hypoxia.     Vital Signs Are Reassuring. RESULTS: COVID and Strep negative     My overall impression is Acute pharyngitis, laryngitis. I considered, but at this time, do not suspect OM, OE, COVID, meningitis, pneumonia, bacterial sinusitis, or significant dehydration requiring IV fluids or admission.    D/C Meds: Azithromycin (instructed to hold until 8/21/2022, and if symptoms persist then start), Cepacol lozenges. D/C Information: Tylenol/Ibuprofen PRN, Hydration. The diagnosis, treatment plan, instructions for follow-up and reevaluation with Primary Care as well as ED return precautions were discussed and understanding was verbalized. All questions or concerns have been addressed.        Scribe Attestation:   Scribe #1: I performed the above scribed service and the documentation accurately describes  the services I performed. I attest to the accuracy of the note.               I, CLINTON Yousif personally performed the services described in this documentation.  All medical record entries made by the scribe were at my direction and in my presence.  I have reviewed the chart and agree that the record reflects my personal performance and is accurate and complete.  Clinical Impression:   Final diagnoses:  [J02.9] Acute pharyngitis, unspecified etiology (Primary)  [J04.0] Laryngitis  [J30.9] Allergic rhinitis, unspecified seasonality, unspecified trigger          ED Disposition Condition    Discharge Stable        ED Prescriptions     Medication Sig Dispense Start Date End Date Auth. Provider    acetaminophen (TYLENOL) 650 MG TbSR Take 1 tablet (650 mg total) by mouth every 8 (eight) hours. for 5 days 20 tablet 8/16/2022 8/21/2022 JORJE Zamorano    ibuprofen (ADVIL,MOTRIN) 600 MG tablet Take 1 tablet (600 mg total) by mouth every 6 (six) hours as needed for Pain. 20 tablet 8/16/2022 8/21/2022 JORJE Zamorano    azithromycin (Z-EH) 250 MG tablet Take 1 tablet (250 mg total) by mouth once daily. Take first 2 tablets together, then 1 every day until finished 6 tablet 8/16/2022  OJRJE Zamorano    benzocaine-menthoL (CEPACOL SORE THROAT, PHANI-MEN,) 15-3.6 mg Lozg 1 lozenge by Mucous Membrane route 3 (three) times daily as needed (throat pain). 30 lozenge 8/16/2022 8/21/2022 JORJE Zamorano        Follow-up Information     Follow up With Specialties Details Why Contact Info    Landy Marie MD Internal Medicine Schedule an appointment as soon as possible for a visit in 2 days  [address]  [facility], [address]  [facility] [address]  [phone]      [facility] ED Emergency Medicine Go to  If symptoms worsen [address] [facility] [address] [facility] [address] [phone]           JORJE Zamorano  08/16/22 [id number]

## 2022-08-31 DIAGNOSIS — Z78.0 MENOPAUSE: ICD-10-CM

## 2022-09-13 DIAGNOSIS — J32.9 FREQUENT SINUS INFECTIONS: ICD-10-CM

## 2022-09-13 RX ORDER — FLUTICASONE PROPIONATE 50 MCG
2 SPRAY, SUSPENSION (ML) NASAL 2 TIMES DAILY
Qty: 48 ML | Refills: 1 | OUTPATIENT
Start: 2022-09-13

## 2022-09-13 NOTE — TELEPHONE ENCOUNTER
----- Message from Connie Wilkes MD sent at 9/13/2022 11:20 AM CDT -----  Regarding: No Rx / Needs apt  Hello,    Please tell client I am unable to fill request for Flonase because it has been over 12 months since last visit.    I will be happy to prescribe if/when client  is seen here.  In the meantime, client can ask her PCPor purchase over the counter.    Thanks.  Connie

## 2022-10-05 ENCOUNTER — PES CALL (OUTPATIENT)
Dept: ADMINISTRATIVE | Facility: OTHER | Age: 67
End: 2022-10-05
Payer: COMMERCIAL

## 2022-10-18 ENCOUNTER — TELEPHONE (OUTPATIENT)
Dept: ADMINISTRATIVE | Facility: CLINIC | Age: 67
End: 2022-10-18
Payer: COMMERCIAL

## 2022-10-18 NOTE — TELEPHONE ENCOUNTER
Called pt, informed pt I was calling to remind pt of her in office EAWV on 10/20/22; clinic location provided to patient; pt confirmed appointment

## 2022-10-20 ENCOUNTER — OFFICE VISIT (OUTPATIENT)
Dept: FAMILY MEDICINE | Facility: CLINIC | Age: 67
End: 2022-10-20
Payer: MEDICARE

## 2022-10-20 VITALS
BODY MASS INDEX: 26.41 KG/M2 | WEIGHT: 139.88 LBS | DIASTOLIC BLOOD PRESSURE: 82 MMHG | TEMPERATURE: 98 F | OXYGEN SATURATION: 99 % | HEIGHT: 61 IN | SYSTOLIC BLOOD PRESSURE: 138 MMHG | HEART RATE: 71 BPM

## 2022-10-20 DIAGNOSIS — K21.9 GASTROESOPHAGEAL REFLUX DISEASE, UNSPECIFIED WHETHER ESOPHAGITIS PRESENT: ICD-10-CM

## 2022-10-20 DIAGNOSIS — Z00.00 ENCOUNTER FOR PREVENTIVE HEALTH EXAMINATION: Primary | ICD-10-CM

## 2022-10-20 DIAGNOSIS — I10 ESSENTIAL HYPERTENSION: ICD-10-CM

## 2022-10-20 DIAGNOSIS — J31.0 CHRONIC RHINITIS: ICD-10-CM

## 2022-10-20 DIAGNOSIS — G47.00 INSOMNIA, UNSPECIFIED TYPE: ICD-10-CM

## 2022-10-20 PROCEDURE — G0439 PR MEDICARE ANNUAL WELLNESS SUBSEQUENT VISIT: ICD-10-PCS | Mod: S$GLB,,, | Performed by: NURSE PRACTITIONER

## 2022-10-20 PROCEDURE — 90694 VACC AIIV4 NO PRSRV 0.5ML IM: CPT | Mod: S$GLB,,, | Performed by: NURSE PRACTITIONER

## 2022-10-20 PROCEDURE — G0008 FLU VACCINE - QUADRIVALENT - ADJUVANTED: ICD-10-PCS | Mod: S$GLB,,, | Performed by: NURSE PRACTITIONER

## 2022-10-20 PROCEDURE — 90694 FLU VACCINE - QUADRIVALENT - ADJUVANTED: ICD-10-PCS | Mod: S$GLB,,, | Performed by: NURSE PRACTITIONER

## 2022-10-20 PROCEDURE — G0439 PPPS, SUBSEQ VISIT: HCPCS | Mod: S$GLB,,, | Performed by: NURSE PRACTITIONER

## 2022-10-20 PROCEDURE — G0008 ADMIN INFLUENZA VIRUS VAC: HCPCS | Mod: S$GLB,,, | Performed by: NURSE PRACTITIONER

## 2022-10-20 PROCEDURE — 99999 PR PBB SHADOW E&M-EST. PATIENT-LVL V: CPT | Mod: PBBFAC,,, | Performed by: NURSE PRACTITIONER

## 2022-10-20 PROCEDURE — 99999 PR PBB SHADOW E&M-EST. PATIENT-LVL V: ICD-10-PCS | Mod: PBBFAC,,, | Performed by: NURSE PRACTITIONER

## 2022-10-20 NOTE — PATIENT INSTRUCTIONS
Counseling and Referral of Other Preventative  (Italic type indicates deductible and co-insurance are waived)    Patient Name: Dayana Su  Today's Date: 10/20/2022    Health Maintenance       Date Due Completion Date    Shingles Vaccine (1 of 2) Never done ---    COVID-19 Vaccine (4 - Booster for Pfizer series) 12/03/2021 10/8/2021    DEXA Scan 01/30/2022 1/30/2019    Mammogram 02/01/2022 2/1/2021    Influenza Vaccine (1) 09/01/2022 11/25/2020    Colorectal Cancer Screening 01/01/2023 1/1/2013 (Done)    Override on 1/1/2013: Done    Pneumococcal Vaccines (Age 65+) (3 - PPSV23 if available, else PCV20) 04/25/2024 1/25/2021    Lipid Panel 01/21/2026 1/21/2021    TETANUS VACCINE 01/15/2029 1/15/2019        Orders Placed This Encounter   Procedures    Influenza - Quadrivalent (Adjuvanted)     The following information is provided to all patients.  This information is to help you find resources for any of the problems found today that may be affecting your health:                Living healthy guide: www.[facility].[place].gov      Understanding Diabetes: www.diabetes.org      Eating healthy: www.cdc.gov/healthyweight      CDC home safety checklist: www.cdc.gov/steadi/patient.html      Agency on Aging: www.goea.[place].[facility]      Alcoholics anonymous (AA): www.aa.org      Physical Activity: www.janine.nih.gov/zl9ybbh      Tobacco use: www.quitwithusla.org

## 2022-10-20 NOTE — PROGRESS NOTES
"  Dayana Su presented for a  Medicare AWV and comprehensive Health Risk Assessment today. The following components were reviewed and updated:    Medical history  Family History  Social history  Allergies and Current Medications  Health Risk Assessment  Health Maintenance  Care Team       ** See Completed Assessments for Annual Wellness Visit within the encounter summary.**       The following assessments were completed:  Living Situation  CAGE  Depression Screening  Timed Get Up and Go  Whisper Test  Cognitive Function Screening  Nutrition Screening  ADL Screening  PAQ Screening          Vitals:    10/20/22 0857   BP: 138/82   Pulse: 71   Temp: 97.9 °F (36.6 °C)   TempSrc: Oral   SpO2: 99%   Weight: 63.5 kg (139 lb 14.1 oz)   Height: 5' 1" (1.549 m)     Body mass index is 26.43 kg/m².  Physical Exam  Vitals and nursing note reviewed.   Constitutional:       Appearance: Normal appearance.   Cardiovascular:      Rate and Rhythm: Normal rate.      Pulses: Normal pulses.      Heart sounds: Normal heart sounds.   Pulmonary:      Effort: Pulmonary effort is normal.      Breath sounds: Wheezing (slight exp. wheeze to left post base upon auscultation) present.   Abdominal:      General: Bowel sounds are normal.      Palpations: Abdomen is soft.   Musculoskeletal:         General: Normal range of motion.   Neurological:      Mental Status: She is alert and oriented to person, place, and time.   Psychiatric:         Mood and Affect: Mood normal.         Behavior: Behavior normal.           Diagnoses and health risks identified today and associated recommendations/orders:    1. Encounter for preventive health examination  Pt was seen today for an Annual Wellness visit. Healthcare maintenance and screening recommendations were discussed and updated as indicated. Return in one year for AWV.    Review current opioid prescriptions:n/a  Screen for potential Substance Use Disorders:n/a    2. Essential hypertension  The " current medical regimen is effective;  continue present plan and medications.    3. Gastroesophageal reflux disease, unspecified whether esophagitis present  The current medical regimen is effective;  continue present plan and medications.    4. Chronic rhinitis  The current medical regimen is effective;  continue present plan and medications.    5. Insomnia, unspecified type  The current medical regimen is effective;  continue present plan and medications.        Provided Dayana with a 5-10 year written screening schedule and personal prevention plan. Recommendations were developed using the USPSTF age appropriate recommendations. Education, counseling, and referrals were provided as needed. After Visit Summary printed and given to patient which includes a list of additional screenings\tests needed.    Follow up in about 1 year (around 10/20/2023).    JORJE Keyes  I offered to discuss advanced care planning, including how to pick a person who would make decisions for you if you were unable to make them for yourself, called a health care power of , and what kind of decisions you might make such as use of life sustaining treatments such as ventilators and tube feeding when faced with a life limiting illness recorded on a living will that they will need to know. (How you want to be cared for as you near the end of your natural life)     X Patient is interested in learning more about how to make advanced directives.  I provided them paperwork and offered to discuss this with them.

## 2022-10-20 NOTE — PROGRESS NOTES
Flu vaccine administered as ordered.  VIS given.  Tolerated well.  Told to wait in clinic for 15 mins.  Patient verbalized understanding.

## 2022-12-22 ENCOUNTER — HOSPITAL ENCOUNTER (OUTPATIENT)
Dept: RADIOLOGY | Facility: CLINIC | Age: 67
Discharge: HOME | End: 2022-12-22
Attending: INTERNAL MEDICINE
Payer: COMMERCIAL

## 2022-12-22 DIAGNOSIS — Z78.0 MENOPAUSE: ICD-10-CM

## 2022-12-22 PROCEDURE — 77080 DXA BONE DENSITY AXIAL: CPT | Mod: TC,PO

## 2022-12-22 PROCEDURE — 77080 DEXA BONE DENSITY SPINE HIP: ICD-10-PCS | Mod: 26,,, | Performed by: INTERNAL MEDICINE

## 2022-12-22 PROCEDURE — 77080 DXA BONE DENSITY AXIAL: CPT | Mod: 26,,, | Performed by: INTERNAL MEDICINE

## 2023-02-07 ENCOUNTER — PATIENT MESSAGE (OUTPATIENT)
Dept: ADMINISTRATIVE | Facility: OTHER | Age: 68
End: 2023-02-07
Payer: COMMERCIAL

## 2023-02-20 ENCOUNTER — PATIENT OUTREACH (OUTPATIENT)
Dept: ADMINISTRATIVE | Facility: HOSPITAL | Age: 68
End: 2023-02-20
Payer: COMMERCIAL

## 2023-02-20 NOTE — PROGRESS NOTES
Health Maintenance Due   Topic Date Due    Shingles Vaccine (1 of 2) Never done    COVID-19 Vaccine (4 - Booster for Pfizer series) 12/03/2021    Colorectal Cancer Screening  01/01/2023     Chart reviewed.   Immunizations: Reconciled  Orders placed: N/A  Upcoming appts to satisfy ROVERTO topics: Mammogram 2/28/2023

## 2023-02-28 ENCOUNTER — HOSPITAL ENCOUNTER (OUTPATIENT)
Dept: RADIOLOGY | Facility: HOSPITAL | Age: 68
Discharge: HOME | End: 2023-02-28
Attending: INTERNAL MEDICINE
Payer: MEDICARE

## 2023-02-28 PROCEDURE — 77067 SCR MAMMO BI INCL CAD: CPT | Mod: TC,PO

## 2023-02-28 PROCEDURE — 77063 MAMMO DIGITAL SCREENING BILAT WITH TOMO: ICD-10-PCS | Mod: 26,,, | Performed by: RADIOLOGY

## 2023-02-28 PROCEDURE — 77063 BREAST TOMOSYNTHESIS BI: CPT | Mod: 26,,, | Performed by: RADIOLOGY

## 2023-02-28 PROCEDURE — 77067 SCR MAMMO BI INCL CAD: CPT | Mod: 26,,, | Performed by: RADIOLOGY

## 2023-02-28 PROCEDURE — 77067 MAMMO DIGITAL SCREENING BILAT WITH TOMO: ICD-10-PCS | Mod: 26,,, | Performed by: RADIOLOGY

## 2023-03-03 ENCOUNTER — OFFICE VISIT (OUTPATIENT)
Dept: INTERNAL MEDICINE | Facility: CLINIC | Age: 68
End: 2023-03-03
Payer: MEDICARE

## 2023-03-03 ENCOUNTER — LAB VISIT (OUTPATIENT)
Dept: LAB | Facility: HOSPITAL | Age: 68
End: 2023-03-03
Attending: INTERNAL MEDICINE
Payer: MEDICARE

## 2023-03-03 VITALS
SYSTOLIC BLOOD PRESSURE: 152 MMHG | OXYGEN SATURATION: 99 % | DIASTOLIC BLOOD PRESSURE: 92 MMHG | HEART RATE: 63 BPM | WEIGHT: 133.38 LBS | HEIGHT: 61 IN | BODY MASS INDEX: 25.18 KG/M2

## 2023-03-03 DIAGNOSIS — Z12.11 SCREENING FOR COLON CANCER: ICD-10-CM

## 2023-03-03 DIAGNOSIS — E55.9 MILD VITAMIN D DEFICIENCY: ICD-10-CM

## 2023-03-03 DIAGNOSIS — R73.9 HYPERGLYCEMIA: ICD-10-CM

## 2023-03-03 DIAGNOSIS — Z82.49 FAMILY HISTORY OF CEREBRAL ANEURYSM: ICD-10-CM

## 2023-03-03 DIAGNOSIS — R94.6 ABNORMAL RESULTS OF THYROID FUNCTION STUDIES: ICD-10-CM

## 2023-03-03 DIAGNOSIS — R73.9 HYPERGLYCEMIA: Primary | ICD-10-CM

## 2023-03-03 PROCEDURE — 99214 OFFICE O/P EST MOD 30 MIN: CPT | Mod: 25,S$GLB,, | Performed by: INTERNAL MEDICINE

## 2023-03-03 PROCEDURE — 3061F PR NEG MICROALBUMINURIA RESULT DOCUMENTED/REVIEW: ICD-10-PCS | Mod: CPTII,S$GLB,, | Performed by: INTERNAL MEDICINE

## 2023-03-03 PROCEDURE — 99214 PR OFFICE/OUTPT VISIT, EST, LEVL IV, 30-39 MIN: ICD-10-PCS | Mod: 25,S$GLB,, | Performed by: INTERNAL MEDICINE

## 2023-03-03 PROCEDURE — 94640 PR INHAL RX, AIRWAY OBST/DX SPUTUM INDUCT: ICD-10-PCS | Mod: S$GLB,,, | Performed by: INTERNAL MEDICINE

## 2023-03-03 PROCEDURE — 3066F NEPHROPATHY DOC TX: CPT | Mod: CPTII,S$GLB,, | Performed by: INTERNAL MEDICINE

## 2023-03-03 PROCEDURE — 81003 URINALYSIS AUTO W/O SCOPE: CPT | Performed by: INTERNAL MEDICINE

## 2023-03-03 PROCEDURE — 99999 PR PBB SHADOW E&M-EST. PATIENT-LVL III: ICD-10-PCS | Mod: PBBFAC,,, | Performed by: INTERNAL MEDICINE

## 2023-03-03 PROCEDURE — 3066F PR DOCUMENTATION OF TREATMENT FOR NEPHROPATHY: ICD-10-PCS | Mod: CPTII,S$GLB,, | Performed by: INTERNAL MEDICINE

## 2023-03-03 PROCEDURE — 82570 ASSAY OF URINE CREATININE: CPT | Performed by: INTERNAL MEDICINE

## 2023-03-03 PROCEDURE — 1101F PR PT FALLS ASSESS DOC 0-1 FALLS W/OUT INJ PAST YR: ICD-10-PCS | Mod: CPTII,S$GLB,, | Performed by: INTERNAL MEDICINE

## 2023-03-03 PROCEDURE — 3080F DIAST BP >= 90 MM HG: CPT | Mod: CPTII,S$GLB,, | Performed by: INTERNAL MEDICINE

## 2023-03-03 PROCEDURE — 3061F NEG MICROALBUMINURIA REV: CPT | Mod: CPTII,S$GLB,, | Performed by: INTERNAL MEDICINE

## 2023-03-03 PROCEDURE — 3080F PR MOST RECENT DIASTOLIC BLOOD PRESSURE >= 90 MM HG: ICD-10-PCS | Mod: CPTII,S$GLB,, | Performed by: INTERNAL MEDICINE

## 2023-03-03 PROCEDURE — 3288F FALL RISK ASSESSMENT DOCD: CPT | Mod: CPTII,S$GLB,, | Performed by: INTERNAL MEDICINE

## 2023-03-03 PROCEDURE — 3008F BODY MASS INDEX DOCD: CPT | Mod: CPTII,S$GLB,, | Performed by: INTERNAL MEDICINE

## 2023-03-03 PROCEDURE — 3008F PR BODY MASS INDEX (BMI) DOCUMENTED: ICD-10-PCS | Mod: CPTII,S$GLB,, | Performed by: INTERNAL MEDICINE

## 2023-03-03 PROCEDURE — 1126F PR PAIN SEVERITY QUANTIFIED, NO PAIN PRESENT: ICD-10-PCS | Mod: CPTII,S$GLB,, | Performed by: INTERNAL MEDICINE

## 2023-03-03 PROCEDURE — 1101F PT FALLS ASSESS-DOCD LE1/YR: CPT | Mod: CPTII,S$GLB,, | Performed by: INTERNAL MEDICINE

## 2023-03-03 PROCEDURE — 99999 PR PBB SHADOW E&M-EST. PATIENT-LVL III: CPT | Mod: PBBFAC,,, | Performed by: INTERNAL MEDICINE

## 2023-03-03 PROCEDURE — 3288F PR FALLS RISK ASSESSMENT DOCUMENTED: ICD-10-PCS | Mod: CPTII,S$GLB,, | Performed by: INTERNAL MEDICINE

## 2023-03-03 PROCEDURE — 3077F SYST BP >= 140 MM HG: CPT | Mod: CPTII,S$GLB,, | Performed by: INTERNAL MEDICINE

## 2023-03-03 PROCEDURE — 3077F PR MOST RECENT SYSTOLIC BLOOD PRESSURE >= 140 MM HG: ICD-10-PCS | Mod: CPTII,S$GLB,, | Performed by: INTERNAL MEDICINE

## 2023-03-03 PROCEDURE — 94640 AIRWAY INHALATION TREATMENT: CPT | Mod: S$GLB,,, | Performed by: INTERNAL MEDICINE

## 2023-03-03 PROCEDURE — 1126F AMNT PAIN NOTED NONE PRSNT: CPT | Mod: CPTII,S$GLB,, | Performed by: INTERNAL MEDICINE

## 2023-03-03 RX ORDER — IPRATROPIUM BROMIDE AND ALBUTEROL SULFATE 2.5; .5 MG/3ML; MG/3ML
3 SOLUTION RESPIRATORY (INHALATION)
Status: COMPLETED | OUTPATIENT
Start: 2023-03-03 | End: 2023-03-03

## 2023-03-03 RX ORDER — EZETIMIBE 10 MG/1
10 TABLET ORAL DAILY
Qty: 90 TABLET | Refills: 3
Start: 2023-03-03 | End: 2024-03-02

## 2023-03-03 RX ORDER — NAPROXEN SODIUM 220 MG/1
81 TABLET, FILM COATED ORAL DAILY
Qty: 30 TABLET | Refills: 11
Start: 2023-03-03 | End: 2024-03-02

## 2023-03-03 RX ORDER — BUDESONIDE AND FORMOTEROL FUMARATE DIHYDRATE 160; 4.5 UG/1; UG/1
2 AEROSOL RESPIRATORY (INHALATION) EVERY 12 HOURS
Qty: 10.2 G | Refills: 6 | Status: SHIPPED | OUTPATIENT
Start: 2023-03-03 | End: 2024-03-02

## 2023-03-03 RX ADMIN — IPRATROPIUM BROMIDE AND ALBUTEROL SULFATE 3 ML: 2.5; .5 SOLUTION RESPIRATORY (INHALATION) at 02:03

## 2023-03-03 NOTE — PROGRESS NOTES
Subjective:       Patient ID: Dayana Su is a 67 y.o. female.    Chief Complaint: Annual Exam    HPIPt is coughing and wheezing out of some of her inhalers.  No CP or SOB.  Review of Systems   Constitutional:  Negative for activity change and unexpected weight change.   HENT:  Negative for hearing loss, rhinorrhea and trouble swallowing.    Eyes:  Negative for discharge and visual disturbance.   Respiratory:  Positive for wheezing. Negative for chest tightness and shortness of breath (PND or orthopnea).    Cardiovascular:  Negative for chest pain and palpitations.   Gastrointestinal:  Negative for abdominal pain, blood in stool, constipation, diarrhea, nausea and vomiting.   Endocrine: Negative for polydipsia and polyuria.   Genitourinary:  Negative for difficulty urinating, dysuria, hematuria and menstrual problem.   Musculoskeletal:  Negative for arthralgias, joint swelling and neck pain.   Neurological:  Negative for seizures, syncope, weakness and headaches.   Psychiatric/Behavioral:  Negative for dysphoric mood.      Objective:      Physical Exam  Constitutional:       General: She is not in acute distress.     Appearance: She is well-developed.   HENT:      Head: Normocephalic.   Eyes:      Pupils: Pupils are equal, round, and reactive to light.   Neck:      Thyroid: No thyromegaly.      Vascular: No JVD.   Cardiovascular:      Rate and Rhythm: Normal rate and regular rhythm.      Heart sounds: Normal heart sounds. No murmur heard.    No friction rub. No gallop.   Pulmonary:      Effort: Pulmonary effort is normal.      Breath sounds: Wheezing (improved with nebulizer treatment) present. No rales.   Abdominal:      General: Bowel sounds are normal. There is no distension.      Palpations: Abdomen is soft. There is no mass.      Tenderness: There is no abdominal tenderness. There is no guarding or rebound.   Musculoskeletal:      Cervical back: Neck supple.   Lymphadenopathy:      Cervical: No  cervical adenopathy.   Skin:     General: Skin is warm and dry.   Neurological:      Mental Status: She is alert and oriented to person, place, and time.      Deep Tendon Reflexes: Reflexes are normal and symmetric.   Psychiatric:         Behavior: Behavior normal.         Thought Content: Thought content normal.         Judgment: Judgment normal.       Assessment:       1. Hyperglycemia    2. Mild vitamin D deficiency    3. Screening for colon cancer    4. Family history of cerebral aneurysm    5. Abnormal results of thyroid function studies          Plan:   Hyperglycemia  -     CBC Auto Differential; Future; Expected date: 03/03/2023  -     TSH; Future; Expected date: 03/03/2023  -     Hemoglobin A1C; Future; Expected date: 03/03/2023  -     Urinalysis; Future; Expected date: 03/03/2023  -     Microalbumin/Creatinine Ratio, Urine; Future; Expected date: 03/03/2023    Mild vitamin D deficiency  -     Vitamin D; Future; Expected date: 03/03/2023    Screening for colon cancer  -     Fecal Immunochemical Test (iFOBT); Future; Expected date: 03/03/2023    Family history of cerebral aneurysm  -     CTA Head and Neck (xpd); Future; Expected date: 03/03/2023    Abnormal results of thyroid function studies  -     TSH; Future; Expected date: 03/03/2023    Other orders  -     budesonide-formoterol 160-4.5 mcg (SYMBICORT) 160-4.5 mcg/actuation HFAA; Inhale 2 puffs into the lungs every 12 (twelve) hours. Controller  Dispense: 10.2 g; Refill: 6  -     aspirin 81 MG Chew; Take 1 tablet (81 mg total) by mouth once daily.  Dispense: 30 tablet; Refill: 11  -     ezetimibe (ZETIA) 10 mg tablet; Take 1 tablet (10 mg total) by mouth once daily.  Dispense: 90 tablet; Refill: 3  -     albuterol-ipratropium 2.5 mg-0.5 mg/3 mL nebulizer solution 3 mL

## 2023-03-04 LAB
ALBUMIN/CREAT UR: NORMAL UG/MG (ref 0–30)
BILIRUB UR QL STRIP: NEGATIVE
CLARITY UR REFRACT.AUTO: CLEAR
COLOR UR AUTO: YELLOW
CREAT UR-MCNC: 65 MG/DL (ref 15–325)
GLUCOSE UR QL STRIP: NEGATIVE
HGB UR QL STRIP: NEGATIVE
KETONES UR QL STRIP: NEGATIVE
LEUKOCYTE ESTERASE UR QL STRIP: NEGATIVE
MICROALBUMIN UR DL<=1MG/L-MCNC: <5 UG/ML
NITRITE UR QL STRIP: NEGATIVE
PH UR STRIP: 6 [PH] (ref 5–8)
PROT UR QL STRIP: NEGATIVE
SP GR UR STRIP: 1.01 (ref 1–1.03)
URN SPEC COLLECT METH UR: NORMAL

## 2023-03-10 ENCOUNTER — TELEPHONE (OUTPATIENT)
Dept: INTERNAL MEDICINE | Facility: CLINIC | Age: 68
End: 2023-03-10

## 2023-03-10 DIAGNOSIS — E55.9 MILD VITAMIN D DEFICIENCY: ICD-10-CM

## 2023-03-10 DIAGNOSIS — R94.6 ABNORMAL RESULTS OF THYROID FUNCTION STUDIES: ICD-10-CM

## 2023-03-10 DIAGNOSIS — R73.9 HYPERGLYCEMIA: ICD-10-CM

## 2023-03-10 DIAGNOSIS — I10 ESSENTIAL HYPERTENSION: Primary | ICD-10-CM

## 2023-03-14 ENCOUNTER — HOSPITAL ENCOUNTER (OUTPATIENT)
Dept: RADIOLOGY | Facility: HOSPITAL | Age: 68
Discharge: HOME | End: 2023-03-14
Attending: INTERNAL MEDICINE
Payer: MEDICARE

## 2023-03-14 DIAGNOSIS — Z82.49 FAMILY HISTORY OF CEREBRAL ANEURYSM: ICD-10-CM

## 2023-03-14 PROCEDURE — 70496 CT ANGIOGRAPHY HEAD: CPT | Mod: TC

## 2023-03-14 PROCEDURE — 25500020 PHARM REV CODE 255: Performed by: INTERNAL MEDICINE

## 2023-03-14 PROCEDURE — 70496 CT ANGIOGRAPHY HEAD: CPT | Mod: 26,,, | Performed by: RADIOLOGY

## 2023-03-14 PROCEDURE — 70498 CT ANGIOGRAPHY NECK: CPT | Mod: 26,,, | Performed by: RADIOLOGY

## 2023-03-14 PROCEDURE — 70496 CTA HEAD AND NECK (XPD): ICD-10-PCS | Mod: 26,,, | Performed by: RADIOLOGY

## 2023-03-14 PROCEDURE — 70498 CTA HEAD AND NECK (XPD): ICD-10-PCS | Mod: 26,,, | Performed by: RADIOLOGY

## 2023-03-14 RX ADMIN — IOHEXOL 75 ML: 350 INJECTION, SOLUTION INTRAVENOUS at 08:03

## 2023-03-14 NOTE — PROGRESS NOTES
No aneurysm.    Mild blockage of right carotid will recheck in one year.    How are you feeling from your breathing etc - looks like you may have a sinus infection - are you having any symptoms?

## 2023-03-22 ENCOUNTER — TELEPHONE (OUTPATIENT)
Dept: INTERNAL MEDICINE | Facility: CLINIC | Age: 68
End: 2023-03-22
Payer: COMMERCIAL

## 2023-03-22 NOTE — TELEPHONE ENCOUNTER
----- Message from Elsi Noonan sent at 3/21/2023  4:00 PM CDT -----  Contact: Pt [phone]  [email or web address]     Patient is calling for Medical Advice regarding: Sinus headache and coughing and blowing green mucus    How long has patient had these symptoms: 2 days    Pharmacy name and phone#: [id number] [place], [place] - [address] Phone:  [phone] Fax:  [phone]

## 2023-03-22 NOTE — TELEPHONE ENCOUNTER
Pt feel like she have a sinus infection. Symptoms are sinus headache, coughing and blowing green mucus. No fever     Please advise

## 2023-03-23 RX ORDER — DOXYCYCLINE HYCLATE 100 MG
TABLET ORAL
Qty: 20 TABLET | Refills: 0 | Status: SHIPPED | OUTPATIENT
Start: 2023-03-23

## 2023-03-23 NOTE — TELEPHONE ENCOUNTER
Pt is better from asthma standpoint - has HA and thick green nasal discharge - no SOB or fever - doxy sent.

## 2023-03-26 ENCOUNTER — PATIENT MESSAGE (OUTPATIENT)
Dept: INTERNAL MEDICINE | Facility: CLINIC | Age: 68
End: 2023-03-26
Payer: COMMERCIAL

## 2023-04-10 ENCOUNTER — TELEPHONE (OUTPATIENT)
Dept: INTERNAL MEDICINE | Facility: CLINIC | Age: 68
End: 2023-04-10
Payer: COMMERCIAL

## 2023-04-10 NOTE — TELEPHONE ENCOUNTER
----- Message from Cammie Blackman sent at 4/10/2023 11:27 AM CDT -----  Contact: [phone] Patient  Pt is calling in regards to her lab work that is ordered. Pt needs the lab work sent to [facility]. Please call and advise when faxed over to [facility] please. Thank you

## 2023-04-14 LAB
25(OH)D3 SERPL-MCNC: 53 NG/ML (ref 30–100)
BASOPHILS # BLD AUTO: 91 CELLS/UL (ref 0–200)
BASOPHILS NFR BLD AUTO: 1.6 %
EOSINOPHIL # BLD AUTO: 359 CELLS/UL (ref 15–500)
EOSINOPHIL NFR BLD AUTO: 6.3 %
ERYTHROCYTE [DISTWIDTH] IN BLOOD BY AUTOMATED COUNT: 13.3 % (ref 11–15)
HBA1C MFR BLD: 5.7 % OF TOTAL HGB
HCT VFR BLD AUTO: 39.7 % (ref 35–45)
HGB BLD-MCNC: 12.9 G/DL (ref 11.7–15.5)
LYMPHOCYTES # BLD AUTO: 2696 CELLS/UL (ref 850–3900)
LYMPHOCYTES NFR BLD AUTO: 47.3 %
MCH RBC QN AUTO: 29.1 PG (ref 27–33)
MCHC RBC AUTO-ENTMCNC: 32.5 G/DL (ref 32–36)
MCV RBC AUTO: 89.4 FL (ref 80–100)
MONOCYTES # BLD AUTO: 570 CELLS/UL (ref 200–950)
MONOCYTES NFR BLD AUTO: 10 %
NEUTROPHILS # BLD AUTO: 1984 CELLS/UL (ref 1500–7800)
NEUTROPHILS NFR BLD AUTO: 34.8 %
PLATELET # BLD AUTO: 262 THOUSAND/UL (ref 140–400)
PMV BLD REES-ECKER: 11.5 FL (ref 7.5–12.5)
RBC # BLD AUTO: 4.44 MILLION/UL (ref 3.8–5.1)
TSH SERPL-ACNC: 3.07 MIU/L (ref 0.4–4.5)
WBC # BLD AUTO: 5.7 THOUSAND/UL (ref 3.8–10.8)

## 2023-04-21 ENCOUNTER — PATIENT MESSAGE (OUTPATIENT)
Dept: ADMINISTRATIVE | Facility: HOSPITAL | Age: 68
End: 2023-04-21
Payer: COMMERCIAL

## 2023-04-26 ENCOUNTER — PES CALL (OUTPATIENT)
Dept: ADMINISTRATIVE | Facility: CLINIC | Age: 68
End: 2023-04-26
Payer: COMMERCIAL

## 2023-05-02 RX ORDER — ATORVASTATIN CALCIUM 20 MG/1
TABLET, FILM COATED ORAL
Qty: 90 TABLET | Refills: 3 | Status: SHIPPED | OUTPATIENT
Start: 2023-05-02

## 2023-05-02 NOTE — TELEPHONE ENCOUNTER
Refill Routing Note   Medication(s) are not appropriate for processing by [facility] for the following reason(s):      Required labs outdated    [facility] action(s):  Defer Labs due          Appointments  past 12m or future 3m with PCP    Date Provider   Last Visit   3/3/2023 Landy Marie MD   Next Visit   Visit date not found Landy Marie MD   ED visits in past 90 days: 0        Note composed:10:03 AM 05/02/2023

## 2023-05-02 NOTE — TELEPHONE ENCOUNTER
Care Due:                  Date            Visit Type   Department     Provider  --------------------------------------------------------------------------------                                MYCHART                              FOLLOWUP/OF  [facility] PRIMARY  Last Visit: 03-      FICE VISIT   LAUREN Marie  Next Visit: None Scheduled  None         None Found                                                            Last  Test          Frequency    Reason                     Performed    Due Date  --------------------------------------------------------------------------------    Lipid Panel.  12 months..  atorvastatin, ezetimibe..  Not Found    Overdue    [employer] Embedded Care Due Messages. Reference number: [id number].   5/02/2023 9:18:50 AM CDT

## 2023-05-22 ENCOUNTER — OFFICE VISIT (OUTPATIENT)
Dept: INTERNAL MEDICINE | Facility: CLINIC | Age: 68
End: 2023-05-22
Payer: MEDICARE

## 2023-05-22 VITALS
DIASTOLIC BLOOD PRESSURE: 86 MMHG | OXYGEN SATURATION: 98 % | HEIGHT: 61 IN | BODY MASS INDEX: 25.7 KG/M2 | WEIGHT: 136.13 LBS | HEART RATE: 60 BPM | SYSTOLIC BLOOD PRESSURE: 138 MMHG

## 2023-05-22 DIAGNOSIS — R07.9 CHEST PAIN, UNSPECIFIED TYPE: Primary | ICD-10-CM

## 2023-05-22 PROCEDURE — 3288F FALL RISK ASSESSMENT DOCD: CPT | Mod: CPTII,S$GLB,, | Performed by: INTERNAL MEDICINE

## 2023-05-22 PROCEDURE — 3061F PR NEG MICROALBUMINURIA RESULT DOCUMENTED/REVIEW: ICD-10-PCS | Mod: CPTII,S$GLB,, | Performed by: INTERNAL MEDICINE

## 2023-05-22 PROCEDURE — 3008F BODY MASS INDEX DOCD: CPT | Mod: CPTII,S$GLB,, | Performed by: INTERNAL MEDICINE

## 2023-05-22 PROCEDURE — 3075F PR MOST RECENT SYSTOLIC BLOOD PRESS GE 130-139MM HG: ICD-10-PCS | Mod: CPTII,S$GLB,, | Performed by: INTERNAL MEDICINE

## 2023-05-22 PROCEDURE — 1160F RVW MEDS BY RX/DR IN RCRD: CPT | Mod: CPTII,S$GLB,, | Performed by: INTERNAL MEDICINE

## 2023-05-22 PROCEDURE — 1159F PR MEDICATION LIST DOCUMENTED IN MEDICAL RECORD: ICD-10-PCS | Mod: CPTII,S$GLB,, | Performed by: INTERNAL MEDICINE

## 2023-05-22 PROCEDURE — 93005 EKG 12-LEAD: ICD-10-PCS | Mod: S$GLB,,, | Performed by: INTERNAL MEDICINE

## 2023-05-22 PROCEDURE — 99214 OFFICE O/P EST MOD 30 MIN: CPT | Performed by: INTERNAL MEDICINE

## 2023-05-22 PROCEDURE — 3075F SYST BP GE 130 - 139MM HG: CPT | Mod: CPTII,S$GLB,, | Performed by: INTERNAL MEDICINE

## 2023-05-22 PROCEDURE — 1125F PR PAIN SEVERITY QUANTIFIED, PAIN PRESENT: ICD-10-PCS | Mod: CPTII,S$GLB,, | Performed by: INTERNAL MEDICINE

## 2023-05-22 PROCEDURE — 3044F HG A1C LEVEL LT 7.0%: CPT | Mod: CPTII,S$GLB,, | Performed by: INTERNAL MEDICINE

## 2023-05-22 PROCEDURE — 93005 ELECTROCARDIOGRAM TRACING: CPT | Mod: S$GLB,,, | Performed by: INTERNAL MEDICINE

## 2023-05-22 PROCEDURE — 1125F AMNT PAIN NOTED PAIN PRSNT: CPT | Mod: CPTII,S$GLB,, | Performed by: INTERNAL MEDICINE

## 2023-05-22 PROCEDURE — 99999 PR PBB SHADOW E&M-EST. PATIENT-LVL IV: CPT | Mod: PBBFAC,,, | Performed by: INTERNAL MEDICINE

## 2023-05-22 PROCEDURE — 3288F PR FALLS RISK ASSESSMENT DOCUMENTED: ICD-10-PCS | Mod: CPTII,S$GLB,, | Performed by: INTERNAL MEDICINE

## 2023-05-22 PROCEDURE — 1159F MED LIST DOCD IN RCRD: CPT | Mod: CPTII,S$GLB,, | Performed by: INTERNAL MEDICINE

## 2023-05-22 PROCEDURE — 1101F PT FALLS ASSESS-DOCD LE1/YR: CPT | Mod: CPTII,S$GLB,, | Performed by: INTERNAL MEDICINE

## 2023-05-22 PROCEDURE — 99214 PR OFFICE/OUTPT VISIT, EST, LEVL IV, 30-39 MIN: ICD-10-PCS | Mod: S$GLB,,, | Performed by: INTERNAL MEDICINE

## 2023-05-22 PROCEDURE — 3079F DIAST BP 80-89 MM HG: CPT | Mod: CPTII,S$GLB,, | Performed by: INTERNAL MEDICINE

## 2023-05-22 PROCEDURE — 3066F NEPHROPATHY DOC TX: CPT | Mod: CPTII,S$GLB,, | Performed by: INTERNAL MEDICINE

## 2023-05-22 PROCEDURE — 3066F PR DOCUMENTATION OF TREATMENT FOR NEPHROPATHY: ICD-10-PCS | Mod: CPTII,S$GLB,, | Performed by: INTERNAL MEDICINE

## 2023-05-22 PROCEDURE — 3061F NEG MICROALBUMINURIA REV: CPT | Mod: CPTII,S$GLB,, | Performed by: INTERNAL MEDICINE

## 2023-05-22 PROCEDURE — 3044F PR MOST RECENT HEMOGLOBIN A1C LEVEL <7.0%: ICD-10-PCS | Mod: CPTII,S$GLB,, | Performed by: INTERNAL MEDICINE

## 2023-05-22 PROCEDURE — 1101F PR PT FALLS ASSESS DOC 0-1 FALLS W/OUT INJ PAST YR: ICD-10-PCS | Mod: CPTII,S$GLB,, | Performed by: INTERNAL MEDICINE

## 2023-05-22 PROCEDURE — 99214 OFFICE O/P EST MOD 30 MIN: CPT | Mod: S$GLB,,, | Performed by: INTERNAL MEDICINE

## 2023-05-22 PROCEDURE — 99999 PR PBB SHADOW E&M-EST. PATIENT-LVL IV: ICD-10-PCS | Mod: PBBFAC,,, | Performed by: INTERNAL MEDICINE

## 2023-05-22 PROCEDURE — 93010 ELECTROCARDIOGRAM REPORT: CPT | Mod: S$GLB,,, | Performed by: INTERNAL MEDICINE

## 2023-05-22 PROCEDURE — 3079F PR MOST RECENT DIASTOLIC BLOOD PRESSURE 80-89 MM HG: ICD-10-PCS | Mod: CPTII,S$GLB,, | Performed by: INTERNAL MEDICINE

## 2023-05-22 PROCEDURE — 3008F PR BODY MASS INDEX (BMI) DOCUMENTED: ICD-10-PCS | Mod: CPTII,S$GLB,, | Performed by: INTERNAL MEDICINE

## 2023-05-22 PROCEDURE — 1160F PR REVIEW ALL MEDS BY PRESCRIBER/CLIN PHARMACIST DOCUMENTED: ICD-10-PCS | Mod: CPTII,S$GLB,, | Performed by: INTERNAL MEDICINE

## 2023-05-22 PROCEDURE — 93010 EKG 12-LEAD: ICD-10-PCS | Mod: S$GLB,,, | Performed by: INTERNAL MEDICINE

## 2023-05-22 NOTE — PROGRESS NOTES
"Subjective:       Patient ID: Dayana Su is a 67 y.o. female.    Chief Complaint: Arm Pain (Left )    HPI  67 y.o. female here for evaluation of left arm pain.PCP:  Landy Marie MD  New to me. Urgent visit.   She has HTN, HLD, GERD.  Cardiology visit 2018 for atypical chest pain. Stress echo negative for ischemia at that time.     Outside records at [facility] reviewed. Cardiologist is there. On aggressive lipid lowering regimen due to agatson score and family hx.     She reports 2 new pains this am:    Left outer breast/chest wall pain that is TTP and is what brought her in.    Left sided chest pain pressure like over heart for 30 minutes when awakening that is now resolved.     She has HTN, HLD, GERD.  Cardiology visit 2018 for atypical chest pain. Stress echo negative for ischemia at that time.      Pain traveling down back of her left leg that often awakens her at night. She has mentioned this to her cardiologist. Shins are shiny and has cramps; worries about her circulation. He is planning to test for PAD this summer.     Aleks brings her in today is a new pain in left chest, left outer breast. Hurts to touch, hurts to have arm close to this area. She also had left sided chest pain (over heart) this am lasting about 30 minutes. Not short of breath. No recalled injury or heavy lifting.   She takes asa 81mg and has bruising including a large bruise on her left shin that is painful.     Dr. Nathaniel Ugalde at [facility] cardiologist. He last saw her 5/3/23 and has been aggressive in controlling cholesterol due to family risk factors and Agatson score 246.  Review of Systems   Constitutional:  Negative for fever.   Respiratory:  Negative for shortness of breath.    Cardiovascular:  Negative for chest pain.   Musculoskeletal: Negative.    Skin: Negative.      Objective:   /86 (BP Location: Left arm, Patient Position: Sitting, BP Method: Medium (Manual))   Pulse 60   Ht 5' 1" (1.549 m)   Wt 61.7 kg " (136 lb 2.1 oz)   SpO2 98%   BMI 25.72 kg/m²      Physical Exam  Constitutional:       General: She is not in acute distress.     Appearance: She is well-developed. She is not diaphoretic.   HENT:      Head: Normocephalic and atraumatic.   Cardiovascular:      Rate and Rhythm: Normal rate and regular rhythm.   Pulmonary:      Effort: Pulmonary effort is normal. No respiratory distress.      Breath sounds: No wheezing or rales.   Skin:     General: Skin is warm and dry.   Neurological:      Mental Status: She is alert and oriented to person, place, and time.   Psychiatric:         Behavior: Behavior normal.         Ttp left chest at edge of left breast in 3 o clock position  Assessment:       1. Chest pain, unspecified type        Plan:       Dayana was seen today for arm pain.    Diagnoses and all orders for this visit:    Chest pain, unspecified type  -     IN OFFICE EKG 12-LEAD (to Muse)  The current pain appears MSK in nature, ok to take tylenol, monitor  The left sided pain this am might be angina. No current sx and EKG appears stable. Advised to discuss with her cardiologist and go to ED if this occurs again.        EKG today: nonspecific t wave abnormality in anterior leads, similar to 2021

## 2023-05-31 RX ORDER — TRAZODONE HYDROCHLORIDE 100 MG/1
TABLET ORAL
Qty: 90 TABLET | Refills: 3 | Status: SHIPPED | OUTPATIENT
Start: 2023-05-31

## 2023-06-01 NOTE — TELEPHONE ENCOUNTER
Refill Decision Note   Dayana Su  is requesting a refill authorization.  Brief Assessment and Rationale for Refill:  Approve     Medication Therapy Plan:         Comments:     Note composed:8:11 PM 05/31/2023

## 2023-08-27 ENCOUNTER — HOSPITAL ENCOUNTER (EMERGENCY)
Facility: HOSPITAL | Age: 68
Discharge: HOME | End: 2023-08-27
Attending: EMERGENCY MEDICINE
Payer: MEDICARE

## 2023-08-27 VITALS
TEMPERATURE: 98 F | HEIGHT: 61 IN | RESPIRATION RATE: 16 BRPM | BODY MASS INDEX: 25.49 KG/M2 | HEART RATE: 58 BPM | DIASTOLIC BLOOD PRESSURE: 58 MMHG | SYSTOLIC BLOOD PRESSURE: 124 MMHG | WEIGHT: 135 LBS | OXYGEN SATURATION: 99 %

## 2023-08-27 DIAGNOSIS — M25.569 KNEE PAIN: ICD-10-CM

## 2023-08-27 DIAGNOSIS — M25.562 LEFT KNEE PAIN, UNSPECIFIED CHRONICITY: Primary | ICD-10-CM

## 2023-08-27 PROCEDURE — 99285 EMERGENCY DEPT VISIT HI MDM: CPT | Mod: 25,ER

## 2023-08-27 PROCEDURE — 96372 THER/PROPH/DIAG INJ SC/IM: CPT | Performed by: EMERGENCY MEDICINE

## 2023-08-27 PROCEDURE — 63600175 PHARM REV CODE 636 W HCPCS: Mod: ER | Performed by: EMERGENCY MEDICINE

## 2023-08-27 RX ORDER — KETOROLAC TROMETHAMINE 30 MG/ML
30 INJECTION, SOLUTION INTRAMUSCULAR; INTRAVENOUS
Status: COMPLETED | OUTPATIENT
Start: 2023-08-27 | End: 2023-08-27

## 2023-08-27 RX ORDER — MELOXICAM 15 MG/1
15 TABLET ORAL DAILY
Qty: 14 TABLET | Refills: 0 | Status: SHIPPED | OUTPATIENT
Start: 2023-08-27 | End: 2023-09-25 | Stop reason: SDUPTHER

## 2023-08-27 RX ADMIN — KETOROLAC TROMETHAMINE 30 MG: 30 INJECTION, SOLUTION INTRAMUSCULAR; INTRAVENOUS at 02:08

## 2023-08-27 NOTE — DISCHARGE INSTRUCTIONS
Take the prescribed anti-inflammatory daily. Wear a neoprene knee sleeve for support. Follow up with Orthopedics as soon as possible for further evaluation.

## 2023-08-27 NOTE — ED PROVIDER NOTES
"Encounter Date: 2023    SCRIBE #1 NOTE: I, Uzma Toro, am scribing for, and in the presence of,  Florence Mujica MD. I have scribed the following portions of the note - Other sections scribed: HPI,ROS,PE.       History     Chief Complaint   Patient presents with    Leg Pain     Left leg pain, patient reports behind left knee.  Reports having trouble standing today in Muslim related to pain.      Dayana Su is a 67 y.o. female, with a PMHx of HLD, who presents to the ED with left knee pain that began about "1 month ago". Patient reports feeling a pain behind her knee and reports that her knee gave out today while standing in Muslim. Patient states that this pain happens intermittently and states that it has never gotten this intense before which is what brought her into the ED today. Patient denies taking any medications to alleviate her symptoms and states that she is allergic to penicillin. No other exacerbating or alleviating factors.     The history is provided by the patient. No  was used.     Review of patient's allergies indicates:   Allergen Reactions    Pcn [penicillins] Hives     Past Medical History:   Diagnosis Date    H/O tubal ligation     H/O: hysterectomy     Hyperlipidemia      Past Surgical History:   Procedure Laterality Date    HYSTERECTOMY  1986    fibroids     OOPHORECTOMY      TUBAL LIGATION       Family History   Problem Relation Age of Onset    Heart disease Mother          at 46    Heart disease Father          at 67    Heart disease Sister          26    Heart disease Sister          33    Lupus Sister          at 40     Aneurysm Sister          at 38    Heart failure Brother     Hypertension Sister     Cancer Neg Hx     Diabetes Neg Hx      Social History     Tobacco Use    Smoking status: Former     Current packs/day: 0.00     Average packs/day: 0.3 packs/day for 30.0 years (7.5 ttl pk-yrs)     Types: " Cigarettes     Start date: 1970     Quit date: 2000     Years since quittin.3    Smokeless tobacco: Never   Substance Use Topics    Alcohol use: No    Drug use: No     Review of Systems   Constitutional:  Negative for activity change, appetite change, chills and fever.   HENT:  Negative for congestion, rhinorrhea, sneezing and sore throat.    Respiratory:  Negative for cough, choking, shortness of breath and wheezing.    Cardiovascular:  Negative for chest pain and palpitations.   Gastrointestinal:  Negative for abdominal pain, diarrhea, nausea and vomiting.   Musculoskeletal:  Positive for arthralgias (left knee). Negative for gait problem and joint swelling.   Skin:  Negative for color change and rash.   Neurological:  Negative for dizziness, syncope, light-headedness and headaches.   All other systems reviewed and are negative.      Physical Exam     Initial Vitals [23 1221]   BP Pulse Resp Temp SpO2   (!) 144/85 71 18 98.2 °F (36.8 °C) 98 %      MAP       --         Physical Exam    Nursing note and vitals reviewed.  Constitutional: She appears well-developed and well-nourished. No distress.   HENT:   Head: Normocephalic and atraumatic.   Eyes: Conjunctivae are normal.   Neck:   Normal range of motion.  Cardiovascular:  Normal rate, regular rhythm and normal heart sounds.           No murmur heard.  Pulmonary/Chest: Breath sounds normal. No respiratory distress.   Abdominal: Bowel sounds are normal. She exhibits no distension.   Musculoskeletal:         General: No tenderness or edema. Normal range of motion.      Cervical back: Normal range of motion.      Left knee: No swelling. No tenderness.      Comments: Left knee is non-tender, soft and no bruising.     Neurological: She is alert and oriented to person, place, and time.   Skin: Skin is warm and dry.   Psychiatric: She has a normal mood and affect. Her behavior is normal.         ED Course   Procedures  Labs Reviewed - No data to  "display       Imaging Results              US Extremity Non Vascular Complete Left (Final result)  Result time 08/27/23 15:30:07      Final result by Gaetano Nvaa MD (08/27/23 15:30:07)                   Impression:      No mass or fluid collection identified in the popliteal fossa.  Consider nonemergent MRI of the knee for further evaluation.      Electronically signed by: Gaetano Nava MD  Date:    08/27/2023  Time:    15:30               Narrative:    EXAMINATION:  US EXTREMITY NON VASCULAR COMPLETE LEFT    CLINICAL HISTORY:  left popliteal fossa;    TECHNIQUE:  Soft tissue ultrasound of left popliteal fossa    COMPARISON:  None    FINDINGS:  Popliteal vein appears compressible and patent.  No mass or fluid collection identified in the left popliteal fossa.                                       X-Ray Knee 3 View Left (Final result)  Result time 08/27/23 14:16:22      Final result by Farhana Mosley MD (08/27/23 14:16:22)                   Impression:      Mild degenerative change, no acute process seen.      Electronically signed by: Farhana Mosley MD  Date:    08/27/2023  Time:    14:16               Narrative:    EXAMINATION:  XR KNEE 3 VIEW LEFT    CLINICAL HISTORY:  Pain in unspecified knee    TECHNIQUE:  AP, lateral, and Merchant views of the left knee were performed.    COMPARISON:  None    FINDINGS:  Moderate medial knee compartment joint space narrowing.    Small osteophyte at the medial and lateral knee margin.  Minimal osteophyte at the femoral patellar joint.    No acute fracture, no osseous lesions.    No joint effusion.                                       Medications   ketorolac injection 30 mg (30 mg Intramuscular Given 8/27/23 1431)     Medical Decision Making  Dayana Su is a 67 y.o. female, with a PMHx of HLD, who presents to the ED with left knee pain that began about "1 month ago". On exam Left knee is non-tender, soft and no bruising. No palpable mass in " popliteal fossa. In shared decision making with the patient we discussed imaging with xray and ultrasound. I considered but excluded fracture, dislocation, effusion, baker's cyst. I will treat pain with mobic, suggest knee sleeve for support, and close Ortho follow up for further evaluation.     Amount and/or Complexity of Data Reviewed  Radiology: ordered.    Risk  Prescription drug management.            Scribe Attestation:   Scribe #1: I performed the above scribed service and the documentation accurately describes the services I performed. I attest to the accuracy of the note.              I, Dr. Florence Mujica, personally performed the services described in this documentation.   All medical record entries made by the scribe were at my direction and in my presence.   I have reviewed the chart and agree that the record is accurate and complete.   Florence Mujica MD.  1:51 PM 08/27/2023            Clinical Impression:   Final diagnoses:  [M25.569] Knee pain  [M25.562] Left knee pain, unspecified chronicity (Primary)        ED Disposition Condition    Discharge Stable          ED Prescriptions       Medication Sig Dispense Start Date End Date Auth. Provider    meloxicam (MOBIC) 15 MG tablet Take 1 tablet (15 mg total) by mouth once daily. Take on full stomach. 14 tablet 8/27/2023 -- Florence Mujica MD          Follow-up Information       Follow up With Specialties Details Why Contact Info    Matteo Sousa MD Orthopedic Surgery Schedule an appointment as soon as possible for a visit   [address] [place] [address]  [phone]               Florence Mujica MD  08/27/23 5764

## 2023-09-21 ENCOUNTER — TELEPHONE (OUTPATIENT)
Dept: SPORTS MEDICINE | Facility: CLINIC | Age: 68
End: 2023-09-21
Payer: MEDICARE

## 2023-09-21 NOTE — TELEPHONE ENCOUNTER
----- Message from Lainey Wasserman sent at 9/20/2023  4:21 PM CDT -----  Regarding: FW: appt  Contact: pt  Please call patient to schedule appointment   ----- Message -----  From: Kell Rothman  Sent: 9/20/2023   1:36 PM CDT  To: Roya Monae Staff  Subject: appt                                             Pt calling to get an appt for pain in both knees , was told she needs an MRI done , please call     Confirmed patient's contact info below:  Contact Name: Dayana Su  Phone Number: [phone]

## 2023-09-25 ENCOUNTER — OFFICE VISIT (OUTPATIENT)
Dept: SPORTS MEDICINE | Facility: CLINIC | Age: 68
End: 2023-09-25
Payer: MEDICARE

## 2023-09-25 ENCOUNTER — HOSPITAL ENCOUNTER (OUTPATIENT)
Dept: RADIOLOGY | Facility: HOSPITAL | Age: 68
Discharge: HOME | End: 2023-09-25
Attending: ORTHOPAEDIC SURGERY
Payer: MEDICARE

## 2023-09-25 VITALS
WEIGHT: 134.06 LBS | BODY MASS INDEX: 25.33 KG/M2 | SYSTOLIC BLOOD PRESSURE: 129 MMHG | HEART RATE: 62 BPM | DIASTOLIC BLOOD PRESSURE: 73 MMHG

## 2023-09-25 DIAGNOSIS — M25.561 PAIN IN BOTH KNEES, UNSPECIFIED CHRONICITY: ICD-10-CM

## 2023-09-25 DIAGNOSIS — M25.562 PAIN IN BOTH KNEES, UNSPECIFIED CHRONICITY: ICD-10-CM

## 2023-09-25 DIAGNOSIS — M25.562 PAIN IN BOTH KNEES, UNSPECIFIED CHRONICITY: Primary | ICD-10-CM

## 2023-09-25 DIAGNOSIS — M25.561 PAIN IN BOTH KNEES, UNSPECIFIED CHRONICITY: Primary | ICD-10-CM

## 2023-09-25 PROCEDURE — 3044F HG A1C LEVEL LT 7.0%: CPT | Mod: CPTII,S$GLB,, | Performed by: ORTHOPAEDIC SURGERY

## 2023-09-25 PROCEDURE — 1101F PR PT FALLS ASSESS DOC 0-1 FALLS W/OUT INJ PAST YR: ICD-10-PCS | Mod: CPTII,S$GLB,, | Performed by: ORTHOPAEDIC SURGERY

## 2023-09-25 PROCEDURE — 73564 X-RAY EXAM KNEE 4 OR MORE: CPT | Mod: TC,50

## 2023-09-25 PROCEDURE — 99204 PR OFFICE/OUTPT VISIT, NEW, LEVL IV, 45-59 MIN: ICD-10-PCS | Mod: S$GLB,,, | Performed by: ORTHOPAEDIC SURGERY

## 2023-09-25 PROCEDURE — 3288F PR FALLS RISK ASSESSMENT DOCUMENTED: ICD-10-PCS | Mod: CPTII,S$GLB,, | Performed by: ORTHOPAEDIC SURGERY

## 2023-09-25 PROCEDURE — 99204 OFFICE O/P NEW MOD 45 MIN: CPT | Mod: S$GLB,,, | Performed by: ORTHOPAEDIC SURGERY

## 2023-09-25 PROCEDURE — 1159F PR MEDICATION LIST DOCUMENTED IN MEDICAL RECORD: ICD-10-PCS | Mod: CPTII,S$GLB,, | Performed by: ORTHOPAEDIC SURGERY

## 2023-09-25 PROCEDURE — 1125F PR PAIN SEVERITY QUANTIFIED, PAIN PRESENT: ICD-10-PCS | Mod: CPTII,S$GLB,, | Performed by: ORTHOPAEDIC SURGERY

## 2023-09-25 PROCEDURE — 3074F SYST BP LT 130 MM HG: CPT | Mod: CPTII,S$GLB,, | Performed by: ORTHOPAEDIC SURGERY

## 2023-09-25 PROCEDURE — 73564 X-RAY EXAM KNEE 4 OR MORE: CPT | Mod: 26,50,, | Performed by: RADIOLOGY

## 2023-09-25 PROCEDURE — 3061F PR NEG MICROALBUMINURIA RESULT DOCUMENTED/REVIEW: ICD-10-PCS | Mod: CPTII,S$GLB,, | Performed by: ORTHOPAEDIC SURGERY

## 2023-09-25 PROCEDURE — 1159F MED LIST DOCD IN RCRD: CPT | Mod: CPTII,S$GLB,, | Performed by: ORTHOPAEDIC SURGERY

## 2023-09-25 PROCEDURE — 3078F DIAST BP <80 MM HG: CPT | Mod: CPTII,S$GLB,, | Performed by: ORTHOPAEDIC SURGERY

## 2023-09-25 PROCEDURE — 3008F PR BODY MASS INDEX (BMI) DOCUMENTED: ICD-10-PCS | Mod: CPTII,S$GLB,, | Performed by: ORTHOPAEDIC SURGERY

## 2023-09-25 PROCEDURE — 99999 PR PBB SHADOW E&M-EST. PATIENT-LVL IV: CPT | Mod: PBBFAC,,, | Performed by: ORTHOPAEDIC SURGERY

## 2023-09-25 PROCEDURE — 3061F NEG MICROALBUMINURIA REV: CPT | Mod: CPTII,S$GLB,, | Performed by: ORTHOPAEDIC SURGERY

## 2023-09-25 PROCEDURE — 1125F AMNT PAIN NOTED PAIN PRSNT: CPT | Mod: CPTII,S$GLB,, | Performed by: ORTHOPAEDIC SURGERY

## 2023-09-25 PROCEDURE — 3066F PR DOCUMENTATION OF TREATMENT FOR NEPHROPATHY: ICD-10-PCS | Mod: CPTII,S$GLB,, | Performed by: ORTHOPAEDIC SURGERY

## 2023-09-25 PROCEDURE — 3288F FALL RISK ASSESSMENT DOCD: CPT | Mod: CPTII,S$GLB,, | Performed by: ORTHOPAEDIC SURGERY

## 2023-09-25 PROCEDURE — 73564 XR KNEE ORTHO BILAT WITH FLEXION: ICD-10-PCS | Mod: 26,50,, | Performed by: RADIOLOGY

## 2023-09-25 PROCEDURE — 3078F PR MOST RECENT DIASTOLIC BLOOD PRESSURE < 80 MM HG: ICD-10-PCS | Mod: CPTII,S$GLB,, | Performed by: ORTHOPAEDIC SURGERY

## 2023-09-25 PROCEDURE — 3008F BODY MASS INDEX DOCD: CPT | Mod: CPTII,S$GLB,, | Performed by: ORTHOPAEDIC SURGERY

## 2023-09-25 PROCEDURE — 3074F PR MOST RECENT SYSTOLIC BLOOD PRESSURE < 130 MM HG: ICD-10-PCS | Mod: CPTII,S$GLB,, | Performed by: ORTHOPAEDIC SURGERY

## 2023-09-25 PROCEDURE — 99999 PR PBB SHADOW E&M-EST. PATIENT-LVL IV: ICD-10-PCS | Mod: PBBFAC,,, | Performed by: ORTHOPAEDIC SURGERY

## 2023-09-25 PROCEDURE — 3066F NEPHROPATHY DOC TX: CPT | Mod: CPTII,S$GLB,, | Performed by: ORTHOPAEDIC SURGERY

## 2023-09-25 PROCEDURE — 3044F PR MOST RECENT HEMOGLOBIN A1C LEVEL <7.0%: ICD-10-PCS | Mod: CPTII,S$GLB,, | Performed by: ORTHOPAEDIC SURGERY

## 2023-09-25 PROCEDURE — 1101F PT FALLS ASSESS-DOCD LE1/YR: CPT | Mod: CPTII,S$GLB,, | Performed by: ORTHOPAEDIC SURGERY

## 2023-09-25 RX ORDER — MELOXICAM 15 MG/1
15 TABLET ORAL DAILY
Qty: 30 TABLET | Refills: 1 | Status: SHIPPED | OUTPATIENT
Start: 2023-09-25 | End: 2023-09-25

## 2023-09-25 RX ORDER — MELOXICAM 15 MG/1
15 TABLET ORAL DAILY
Qty: 30 TABLET | Refills: 1 | Status: SHIPPED | OUTPATIENT
Start: 2023-09-25

## 2023-09-25 NOTE — PROGRESS NOTES
CC: Bilateral knee pain    67 y.o. Female with a history of Bilateral knee pain.  Reports its been going on for a while.  She reports that 2 weeks ago she was in the ER because she couldn't walk.  Had to be wheeled out  of [place] in a wheelchair.  She states that the pain has improved since that time. Reports she has tried tumeric which she thinks helped.  Has not tried injections or PT.       + mechanical symptoms, no instability    Is affecting ADLs.      SANE L : 50  SANE R: 75  VAS 5/10    Review of Systems   Constitution: Negative. Negative for chills, fever and night sweats.   HENT: Negative for congestion and headaches.    Eyes: Negative for blurred vision, left vision loss and right vision loss.   Cardiovascular: Negative for chest pain and syncope.   Respiratory: Negative for cough and shortness of breath.    Endocrine: Negative for polydipsia, polyphagia and polyuria.   Hematologic/Lymphatic: Negative for bleeding problem. Does not bruise/bleed easily.   Skin: Negative for dry skin, itching and rash.   Musculoskeletal: Negative for falls. Positive for knee pain and muscle weakness.   Gastrointestinal: Negative for abdominal pain and bowel incontinence.   Genitourinary: Negative for bladder incontinence and nocturia.   Neurological: Negative for disturbances in coordination, loss of balance and seizures.   Psychiatric/Behavioral: Negative for depression. The patient does not have insomnia.    Allergic/Immunologic: Negative for hives and persistent infections.     PAST MEDICAL HISTORY:   Past Medical History:   Diagnosis Date    H/O tubal ligation     H/O: hysterectomy     Hyperlipidemia      PAST SURGICAL HISTORY:   Past Surgical History:   Procedure Laterality Date    HYSTERECTOMY  1986    fibroids     OOPHORECTOMY      TUBAL LIGATION       FAMILY HISTORY:   Family History   Problem Relation Age of Onset    Heart disease Mother          at 46    Heart disease Father          at 67     Heart disease Sister          26    Heart disease Sister          33    Lupus Sister          at 40     Aneurysm Sister          at 38    Heart failure Brother     Hypertension Sister     Cancer Neg Hx     Diabetes Neg Hx      SOCIAL HISTORY:   Social History     Socioeconomic History    Marital status:    Tobacco Use    Smoking status: Former     Current packs/day: 0.00     Average packs/day: 0.3 packs/day for 30.0 years (7.5 ttl pk-yrs)     Types: Cigarettes     Start date: 1970     Quit date: 2000     Years since quittin.4    Smokeless tobacco: Never   Substance and Sexual Activity    Alcohol use: No    Drug use: No    Sexual activity: Not Currently       MEDICATIONS:   Current Outpatient Medications:     aspirin 81 MG Chew, Take 1 tablet (81 mg total) by mouth once daily., Disp: 30 tablet, Rfl: 11    atorvastatin (LIPITOR) 20 MG tablet, TAKE 1 TABLET BY MOUTH EVERY DAY, Disp: 90 tablet, Rfl: 3    biotin 1 mg tablet, Take 1,000 mcg by mouth 3 (three) times daily., Disp: , Rfl:     budesonide-formoterol 160-4.5 mcg (SYMBICORT) 160-4.5 mcg/actuation HFAA, Inhale 2 puffs into the lungs every 12 (twelve) hours. Controller, Disp: 10.2 g, Rfl: 6    calcium carbonate-vitamin D3 250-125 mg 250-125 mg-unit Tab, Take 1 tablet by mouth once., Disp: , Rfl:     doxycycline (VIBRA-TABS) 100 MG tablet, One tablet twice daily with food and a full glass of water, Disp: 20 tablet, Rfl: 0    ezetimibe (ZETIA) 10 mg tablet, Take 1 tablet (10 mg total) by mouth once daily., Disp: 90 tablet, Rfl: 3    fish oil-omega-3 fatty acids 300-1,000 mg capsule, Take by mouth once daily., Disp: , Rfl:     fluticasone propionate (FLONASE) 50 mcg/actuation nasal spray, 2 sprays (100 mcg total) by Each Nostril route 2 (two) times daily., Disp: 16 mL, Rfl: 5    loratadine (CLARITIN) 10 mg tablet, Take 1 tablet (10 mg total) by mouth once daily., Disp: 90 tablet, Rfl: 3    montelukast (SINGULAIR) 10 mg tablet,  Take 1 tablet (10 mg total) by mouth once daily., Disp: 90 tablet, Rfl: 3    multivitamin-calcium carb Chew, Take by mouth., Disp: , Rfl:     pulse oximeter (PULSE OXIMETER) device, by Apply Externally route 2 (two) times a day. Use twice daily at 8 AM and 3 PM and record the value in MyChart as directed., Disp: 1 each, Rfl: 0    traZODone (DESYREL) 100 MG tablet, TAKE 1 TABLET BY MOUTH EVERY DAY IN THE EVENING, Disp: 90 tablet, Rfl: 3    vitamin B complex (B COMPLEX-VITAMIN B12 ORAL), Take 5,000 mcg by mouth., Disp: , Rfl:     albuterol (PROVENTIL/VENTOLIN HFA) 90 mcg/actuation inhaler, Inhale 2 puffs into the lungs every 4 (four) hours as needed for Wheezing. Rescue, Disp: 54 g, Rfl: 1    estradioL (ESTRACE) 0.01 % (0.1 mg/gram) vaginal cream, 1 g three times weekly (Patient not taking: Reported on 10/20/2022), Disp: 42.5 g, Rfl: 2    inhalation spacing device (BREATHERITE MDI SPACER), Use as directed for inhalation. (Patient not taking: Reported on 10/20/2022), Disp: 1 Device, Rfl: 0    meloxicam (MOBIC) 15 MG tablet, Take 1 tablet (15 mg total) by mouth once daily. Take on full stomach., Disp: 30 tablet, Rfl: 1  ALLERGIES:   Review of patient's allergies indicates:   Allergen Reactions    Pcn [penicillins] Hives       VITAL SIGNS: /73   Pulse 62   Wt 60.8 kg (134 lb 0.6 oz)   BMI 25.33 kg/m²      PHYSICAL EXAMINATION  VITAL SIGNS: /73   Pulse 62   Wt 60.8 kg (134 lb 0.6 oz)   BMI 25.33 kg/m²    General:  The patient is alert and oriented x 3.  Mood is pleasant.  Observation of ears, eyes and nose reveal no gross abnormalities.  HEENT: NCAT, sclera nonicteric  Lungs: Respirations are equal and unlabored.    Left KNEE EXAMINATION     OBSERVATION / INSPECTION   Gait:   Nonantalgic   Alignment:  Neutral   Scars:   None   Muscle atrophy: Mild  Effusion:  None   Warmth:  None   Discoloration:   none     TENDERNESS / CREPITUS (T / C):          T / C      T / C   Patella   - / -   Lateral joint line    - / -    Peripatellar medial  -  Medial joint line    + / -    Peripatellar lateral -  Medial plica   - / -    Patellar tendon -   Popliteal fossa  - / -    Quad tendon   -   Gastrocnemius   -   Prepatellar Bursa - / -   Quadricep   -   Tibial tubercle  -  Thigh/hamstring  -   Pes anserine/HS -  Fibula    -   ITB   - / -  Tibia     -   Tib/fib joint  - / -  LCL    -     MFC   - / -   MCL: Proximal  -    LFC   - / -    Distal   -          ROM: (* = pain)  PASSIVE   ACTIVE    Left :    0  145   5 / 0 / 145     Right :    5  0  145   5 / 0 / 145    PATELLOFEMORAL EXAMINATION:  See above noted areas of tenderness.   Patella position    Subluxation / dislocation: Centered           Sup. / Inf;   Normal   Crepitus (PF):    Absent   Patellar Mobility:       Medial-lateral:   Normal    Superior-inferior:  Normal    Inferior tilt   Normal    Patellar tendon:  Normal   Lateral tilt:    Normal   J-sign:     None   Patellofemoral grind:   No pain       MENISCAL SIGNS:     Pain on terminal extension:  +  Pain on terminal flexion:  +  Yamiles maneuver:  + for pain  Squat     + posterior joint pain    LIGAMENT EXAMINATION:  ACL / Lachman:  normal (-1 to 2mm)    PCL-Post.  drawer: normal 0 to 2mm  MCL- Valgus:  normal 0 to 2mm  LCL- Varus:  normal 0 to 2mm  Pivot shift: normal (Equal)   Dial Test: difference c/w other side   At 30° flexion: normal (< 5°)    At 90° flexion: normal (< 5°)   Reverse Pivot Shift:   normal (Equal)     STRENGTH: (* = with pain) PAINFUL SIDE   Quadricep   5/5   Hamstrin/5    Right KNEE EXAMINATION     OBSERVATION / INSPECTION   Gait:   Nonantalgic   Alignment:  Neutral   Scars:   None   Muscle atrophy: Mild  Effusion:  None   Warmth:  None   Discoloration:   none     TENDERNESS / CREPITUS (T / C):          T / C      T / C   Patella   - / -   Lateral joint line   - / -    Peripatellar medial  -  Medial joint line    + / -    Peripatellar lateral -  Medial plica   - / -    Patellar  tendon -   Popliteal fossa  - / -    Quad tendon   -   Gastrocnemius   -   Prepatellar Bursa - / -   Quadricep   -   Tibial tubercle  -  Thigh/hamstring  -   Pes anserine/HS -  Fibula    -   ITB   - / -  Tibia     -   Tib/fib joint  - / -  LCL    -     MFC   - / -   MCL: Proximal  -    LFC   - / -    Distal   -          ROM: (* = pain)  PASSIVE   ACTIVE    Left :   5 / 0 / 145   5 / 0 / 145     Right :    5 / 0 / 145   5 / 0 / 145    PATELLOFEMORAL EXAMINATION:  See above noted areas of tenderness.   Patella position    Subluxation / dislocation: Centered           Sup. / Inf;   Normal   Crepitus (PF):    Absent   Patellar Mobility:       Medial-lateral:   Normal    Superior-inferior:  Normal    Inferior tilt   Normal    Patellar tendon:  Normal   Lateral tilt:    Normal   J-sign:     None   Patellofemoral grind:   No pain       MENISCAL SIGNS:     Pain on terminal extension:  +  Pain on terminal flexion:  +  Yamiles maneuver:  + for pain  Squat     + posterior joint pain    LIGAMENT EXAMINATION:  ACL / Lachman:  normal (-1 to 2mm)    PCL-Post.  drawer: normal 0 to 2mm  MCL- Valgus:  normal 0 to 2mm  LCL- Varus:  normal 0 to 2mm  Pivot shift: normal (Equal)   Dial Test: difference c/w other side   At 30° flexion: normal (< 5°)    At 90° flexion: normal (< 5°)   Reverse Pivot Shift:   normal (Equal)     STRENGTH: (* = with pain) PAINFUL SIDE   Quadricep   5/5   Hamstrin/5    EXTREMITY NEURO-VASCULAR EXAMINATION:   Sensation:  Grossly intact to light touch all dermatomal regions.   Motor Function:  Fully intact motor function at hip, knee, foot and ankle    DTRs;  quadriceps and  achilles 2+.  No clonus and downgoing Babinski.    Vascular status:  DP and PT pulses 2+, brisk capillary refill, symmetric.     Other Findings:       X-rays reviewed and interpreted personally by me:  including standing, weight bearing AP and flexion bilateral knees, lateral and merchant views ordered and images reviewed by me  show:  No fracture, dislocation, medial compartment narrowing     ASSESSMENT:    Bilateral Knee OA     PLAN:   Continue mobic daily, discussed tylenol arthritis 650mg, 2 tablets in the am and 2 in the pm.   Order placed for PT.   Will see her back in 4 weeks for repeat evaluation and will consider injection at that time if no improvement.   All questions were answered, pt will contact us for questions or concerns in the interim.    I made the decision to obtain old records of the patient including previous notes and imaging. New imaging was ordered today of the extremity or extremities evaluated. I independently reviewed and interpreted the radiographs and/or MRIs today as well as prior imaging.

## 2023-09-27 ENCOUNTER — TELEPHONE (OUTPATIENT)
Dept: SPORTS MEDICINE | Facility: CLINIC | Age: 68
End: 2023-09-27
Payer: MEDICARE

## 2023-09-27 NOTE — TELEPHONE ENCOUNTER
----- Message from Kell Rothman sent at 9/27/2023  8:30 AM CDT -----  Regarding: appt  Pt calling in regards to having shoulder pain ,, would like to come in today if possible , if not today late tomorrow , please call       Confirmed patient's contact info below:  Contact Name: Dayana Su  Phone Number: [phone]

## 2023-10-02 ENCOUNTER — HOSPITAL ENCOUNTER (OUTPATIENT)
Dept: RADIOLOGY | Facility: HOSPITAL | Age: 68
Discharge: HOME | End: 2023-10-02
Attending: ORTHOPAEDIC SURGERY
Payer: MEDICARE

## 2023-10-02 ENCOUNTER — OFFICE VISIT (OUTPATIENT)
Dept: SPORTS MEDICINE | Facility: CLINIC | Age: 68
End: 2023-10-02
Payer: MEDICARE

## 2023-10-02 VITALS
HEART RATE: 62 BPM | SYSTOLIC BLOOD PRESSURE: 165 MMHG | WEIGHT: 134 LBS | HEIGHT: 61 IN | BODY MASS INDEX: 25.3 KG/M2 | DIASTOLIC BLOOD PRESSURE: 91 MMHG

## 2023-10-02 DIAGNOSIS — M25.512 CHRONIC LEFT SHOULDER PAIN: Primary | ICD-10-CM

## 2023-10-02 DIAGNOSIS — M25.512 CHRONIC LEFT SHOULDER PAIN: ICD-10-CM

## 2023-10-02 DIAGNOSIS — M25.512 ACUTE PAIN OF LEFT SHOULDER: ICD-10-CM

## 2023-10-02 DIAGNOSIS — G89.29 CHRONIC LEFT SHOULDER PAIN: Primary | ICD-10-CM

## 2023-10-02 DIAGNOSIS — G89.29 CHRONIC LEFT SHOULDER PAIN: ICD-10-CM

## 2023-10-02 PROCEDURE — 3061F NEG MICROALBUMINURIA REV: CPT | Mod: CPTII,S$GLB,, | Performed by: ORTHOPAEDIC SURGERY

## 2023-10-02 PROCEDURE — 3061F PR NEG MICROALBUMINURIA RESULT DOCUMENTED/REVIEW: ICD-10-PCS | Mod: CPTII,S$GLB,, | Performed by: ORTHOPAEDIC SURGERY

## 2023-10-02 PROCEDURE — 3008F BODY MASS INDEX DOCD: CPT | Mod: CPTII,S$GLB,, | Performed by: ORTHOPAEDIC SURGERY

## 2023-10-02 PROCEDURE — 1125F PR PAIN SEVERITY QUANTIFIED, PAIN PRESENT: ICD-10-PCS | Mod: CPTII,S$GLB,, | Performed by: ORTHOPAEDIC SURGERY

## 2023-10-02 PROCEDURE — 3066F NEPHROPATHY DOC TX: CPT | Mod: CPTII,S$GLB,, | Performed by: ORTHOPAEDIC SURGERY

## 2023-10-02 PROCEDURE — 73030 X-RAY EXAM OF SHOULDER: CPT | Mod: TC,LT

## 2023-10-02 PROCEDURE — 73030 XR SHOULDER COMPLETE 2 OR MORE VIEWS LEFT: ICD-10-PCS | Mod: 26,LT,, | Performed by: RADIOLOGY

## 2023-10-02 PROCEDURE — 3080F PR MOST RECENT DIASTOLIC BLOOD PRESSURE >= 90 MM HG: ICD-10-PCS | Mod: CPTII,S$GLB,, | Performed by: ORTHOPAEDIC SURGERY

## 2023-10-02 PROCEDURE — 1125F AMNT PAIN NOTED PAIN PRSNT: CPT | Mod: CPTII,S$GLB,, | Performed by: ORTHOPAEDIC SURGERY

## 2023-10-02 PROCEDURE — 99214 PR OFFICE/OUTPT VISIT, EST, LEVL IV, 30-39 MIN: ICD-10-PCS | Mod: S$GLB,,, | Performed by: ORTHOPAEDIC SURGERY

## 2023-10-02 PROCEDURE — 3044F PR MOST RECENT HEMOGLOBIN A1C LEVEL <7.0%: ICD-10-PCS | Mod: CPTII,S$GLB,, | Performed by: ORTHOPAEDIC SURGERY

## 2023-10-02 PROCEDURE — 3044F HG A1C LEVEL LT 7.0%: CPT | Mod: CPTII,S$GLB,, | Performed by: ORTHOPAEDIC SURGERY

## 2023-10-02 PROCEDURE — 3008F PR BODY MASS INDEX (BMI) DOCUMENTED: ICD-10-PCS | Mod: CPTII,S$GLB,, | Performed by: ORTHOPAEDIC SURGERY

## 2023-10-02 PROCEDURE — 3077F PR MOST RECENT SYSTOLIC BLOOD PRESSURE >= 140 MM HG: ICD-10-PCS | Mod: CPTII,S$GLB,, | Performed by: ORTHOPAEDIC SURGERY

## 2023-10-02 PROCEDURE — 73030 X-RAY EXAM OF SHOULDER: CPT | Mod: 26,LT,, | Performed by: RADIOLOGY

## 2023-10-02 PROCEDURE — 3080F DIAST BP >= 90 MM HG: CPT | Mod: CPTII,S$GLB,, | Performed by: ORTHOPAEDIC SURGERY

## 2023-10-02 PROCEDURE — 99214 OFFICE O/P EST MOD 30 MIN: CPT | Mod: S$GLB,,, | Performed by: ORTHOPAEDIC SURGERY

## 2023-10-02 PROCEDURE — 3066F PR DOCUMENTATION OF TREATMENT FOR NEPHROPATHY: ICD-10-PCS | Mod: CPTII,S$GLB,, | Performed by: ORTHOPAEDIC SURGERY

## 2023-10-02 PROCEDURE — 99999 PR PBB SHADOW E&M-EST. PATIENT-LVL III: ICD-10-PCS | Mod: PBBFAC,,, | Performed by: ORTHOPAEDIC SURGERY

## 2023-10-02 PROCEDURE — 99999 PR PBB SHADOW E&M-EST. PATIENT-LVL III: CPT | Mod: PBBFAC,,, | Performed by: ORTHOPAEDIC SURGERY

## 2023-10-02 PROCEDURE — 3077F SYST BP >= 140 MM HG: CPT | Mod: CPTII,S$GLB,, | Performed by: ORTHOPAEDIC SURGERY

## 2023-10-02 NOTE — PROGRESS NOTES
CC: left Shoulder pain    67 y.o. Female reports that the pain is severe and not responding to any conservative care.      She reports that the pain is worse with overhead activity. It also bothers her at night.    SANE 40    Pain 5/10    Pain with her normal routine of exercising, not a specific event, end 2023      PAST MEDICAL HISTORY:   Past Medical History:   Diagnosis Date    H/O tubal ligation     H/O: hysterectomy     Hyperlipidemia      PAST SURGICAL HISTORY:   Past Surgical History:   Procedure Laterality Date    HYSTERECTOMY  1986    fibroids     OOPHORECTOMY      TUBAL LIGATION       FAMILY HISTORY:   Family History   Problem Relation Age of Onset    Heart disease Mother          at 46    Heart disease Father          at 67    Heart disease Sister          26    Heart disease Sister          33    Lupus Sister          at 40     Aneurysm Sister          at 38    Heart failure Brother     Hypertension Sister     Cancer Neg Hx     Diabetes Neg Hx      SOCIAL HISTORY:   Social History     Socioeconomic History    Marital status:    Tobacco Use    Smoking status: Former     Current packs/day: 0.00     Average packs/day: 0.3 packs/day for 30.0 years (7.5 ttl pk-yrs)     Types: Cigarettes     Start date: 1970     Quit date: 2000     Years since quittin.4    Smokeless tobacco: Never   Substance and Sexual Activity    Alcohol use: No    Drug use: No    Sexual activity: Not Currently       MEDICATIONS:   Current Outpatient Medications:     albuterol (PROVENTIL/VENTOLIN HFA) 90 mcg/actuation inhaler, Inhale 2 puffs into the lungs every 4 (four) hours as needed for Wheezing. Rescue, Disp: 54 g, Rfl: 1    aspirin 81 MG Chew, Take 1 tablet (81 mg total) by mouth once daily., Disp: 30 tablet, Rfl: 11    atorvastatin (LIPITOR) 20 MG tablet, TAKE 1 TABLET BY MOUTH EVERY DAY, Disp: 90 tablet, Rfl: 3    biotin 1 mg tablet, Take 1,000 mcg by mouth 3 (three)  times daily., Disp: , Rfl:     budesonide-formoterol 160-4.5 mcg (SYMBICORT) 160-4.5 mcg/actuation HFAA, Inhale 2 puffs into the lungs every 12 (twelve) hours. Controller, Disp: 10.2 g, Rfl: 6    calcium carbonate-vitamin D3 250-125 mg 250-125 mg-unit Tab, Take 1 tablet by mouth once., Disp: , Rfl:     doxycycline (VIBRA-TABS) 100 MG tablet, One tablet twice daily with food and a full glass of water, Disp: 20 tablet, Rfl: 0    estradioL (ESTRACE) 0.01 % (0.1 mg/gram) vaginal cream, 1 g three times weekly (Patient not taking: Reported on 10/20/2022), Disp: 42.5 g, Rfl: 2    ezetimibe (ZETIA) 10 mg tablet, Take 1 tablet (10 mg total) by mouth once daily., Disp: 90 tablet, Rfl: 3    fish oil-omega-3 fatty acids 300-1,000 mg capsule, Take by mouth once daily., Disp: , Rfl:     fluticasone propionate (FLONASE) 50 mcg/actuation nasal spray, 2 sprays (100 mcg total) by Each Nostril route 2 (two) times daily., Disp: 16 mL, Rfl: 5    inhalation spacing device (BREATHERITE MDI SPACER), Use as directed for inhalation. (Patient not taking: Reported on 10/20/2022), Disp: 1 Device, Rfl: 0    loratadine (CLARITIN) 10 mg tablet, Take 1 tablet (10 mg total) by mouth once daily., Disp: 90 tablet, Rfl: 3    meloxicam (MOBIC) 15 MG tablet, Take 1 tablet (15 mg total) by mouth once daily. Take on full stomach., Disp: 30 tablet, Rfl: 1    montelukast (SINGULAIR) 10 mg tablet, Take 1 tablet (10 mg total) by mouth once daily., Disp: 90 tablet, Rfl: 3    multivitamin-calcium carb Chew, Take by mouth., Disp: , Rfl:     pulse oximeter (PULSE OXIMETER) device, by Apply Externally route 2 (two) times a day. Use twice daily at 8 AM and 3 PM and record the value in [facility] as directed., Disp: 1 each, Rfl: 0    traZODone (DESYREL) 100 MG tablet, TAKE 1 TABLET BY MOUTH EVERY DAY IN THE EVENING, Disp: 90 tablet, Rfl: 3    vitamin B complex (B COMPLEX-VITAMIN B12 ORAL), Take 5,000 mcg by mouth., Disp: , Rfl:   ALLERGIES:   Review of patient's  "allergies indicates:   Allergen Reactions    Pcn [penicillins] Hives       VITAL SIGNS: BP (!) 165/91   Pulse 62   Ht 5' 1" (1.549 m)   Wt 60.8 kg (134 lb)   BMI 25.32 kg/m²      Review of Systems   Constitution: Negative. Negative for chills, fever and night sweats.   HENT: Negative for congestion and headaches.    Eyes: Negative for blurred vision, left vision loss and right vision loss.   Cardiovascular: Negative for chest pain and syncope.   Respiratory: Negative for cough and shortness of breath.    Endocrine: Negative for polydipsia, polyphagia and polyuria.   Hematologic/Lymphatic: Negative for bleeding problem. Does not bruise/bleed easily.   Skin: Negative for dry skin, itching and rash.   Musculoskeletal: Negative for falls and muscle weakness.   Gastrointestinal: Negative for abdominal pain and bowel incontinence.   Genitourinary: Negative for bladder incontinence and nocturia.   Neurological: Negative for disturbances in coordination, loss of balance and seizures.   Psychiatric/Behavioral: Negative for depression. The patient does not have insomnia.    Allergic/Immunologic: Negative for hives and persistent infections.       PHYSICAL EXAMINATION:  General:  The patient is alert and oriented x 3.  Mood is pleasant.  Observation of ears, eyes and nose reveal no gross abnormalities.  HEENT: NCAT, sclera nonicteric  Lungs: Respirations are equal and unlabored.  Gait is coordinated. Patient can toe walk and heel walk without difficulty.  Cardiovascular: There are no swelling or varicosities present.   Lymphatic: Negative for adenopathy       left SHOULDER / UPPER EXTREMITY EXAM    OBSERVATION:     Swelling  none  Deformity  none   Discoloration  none   Scapular winging none   Scars   none  Atrophy  none    TENDERNESS / CREPITUS (T/C):          T/C      T/C   Clavicle   -/-  SUPRAspinatus    -/-   AC Jt.    -/-  INFRAspinatus  -/-   SC Jt.    -/-  Deltoid    -/-   G. Tuberosity  -/-  LH BICEP " groove  +/-   Acromion:  -/-  Midline Neck   -/-   Scapular Spine -/-  Trapezium   -/-   SMA Scapula  -/-  GH jt. line - post  -/-   Scapulothoracic  -/-         ROM: (* = with pain)  Right shoulder   Left shoulder        AROM (PROM)   AROM (PROM)   FE    170° (175°)     170° (175°)     ER at 0°    60°  (65°)    60°  (65°)   ER at 90° ABD  90°  (90°)    90°  (90°)   IR at 90°  ABD   NA  (40°)     NA  (40°)      IR (spine level)   T10     T10    STRENGTH: (* = with pain) RIGHT SHOULDER  LEFT SHOULDER   SCAPTION at 0   5/5    4/5    SCAPTION at 30   5/5    5/5    IR    5/5    5/5   ER    5/5    5/5   BICEPS   5/5    5/5   Deltoid    5/5    5/5     SIGNS:  Painful side       NEER   +    OOMIS  +   DEAN   +    SPEEDS  +   DROP ARM   neg   BELLY PRESS Neg   Superior escape none    LIFT-OFF  Neg   X-Body ADD    neg    MOVING VALGUS Neg      STABILITY TESTING    RIGHT SHOULDER   LEFT SHOULDER       Translation    Anterior  up face     up face    Posterior  up face    up face    Sulcus   < 10mm    < 10 mm    Signs    Apprehension   neg      Neg    Relocation   no change     no change    Jerk test  neg     Neg      EXTREMITY NEURO-VASCULAR EXAM    Sensation grossly intact to light touch all dermatomal regions.    DTR 2+ Biceps, Triceps, BR and Negative Brandos sign   Grossly intact motor function at Elbow, Wrist and Hand   Distal pulses radial and ulnar 2+, brisk cap refill, symmetric.      NECK:  Painless FROM and spinous processes non-tender. Negative Spurlings sign.      OTHER FINDINGS:    XRAYS:  Shoulder trauma series left,  were ordered and reviewed and interpreted by me. No convincing fracture or dislocation is noted. The osseous structures appear well mineralized and well aligned, possible insufficiency fracture greater tuberosity/cystic changes, AC -, degenerative changes    left   1. Shoulder pain,possible RTC tear, complicated   2.  Possible insufficiency fracture greater tuberosity    Plan:        ASSESSMENT:  shoulder pain.    I do think that this is likely a rotator cuff tear, possible insufficiency fracture greater tuberosity.    I have recommended we check an MRI of the shoulder to evaluate this.    Depending on the results of the MRI, we may consider a cortisone   injection and physical therapy and/or arthroscopic intervention and treatment   depending on what we find.  I will see her back upon its completion or PHREV and we will consider the above.

## 2023-10-04 ENCOUNTER — CLINICAL SUPPORT (OUTPATIENT)
Dept: REHABILITATION | Facility: HOSPITAL | Age: 68
End: 2023-10-04
Payer: MEDICARE

## 2023-10-04 DIAGNOSIS — Z74.09 IMPAIRED FUNCTIONAL MOBILITY, BALANCE, GAIT, AND ENDURANCE: ICD-10-CM

## 2023-10-04 DIAGNOSIS — R29.898 WEAKNESS OF BOTH LOWER EXTREMITIES: ICD-10-CM

## 2023-10-04 DIAGNOSIS — M25.561 PAIN IN BOTH KNEES, UNSPECIFIED CHRONICITY: ICD-10-CM

## 2023-10-04 DIAGNOSIS — M25.562 CHRONIC PAIN OF BOTH KNEES: ICD-10-CM

## 2023-10-04 DIAGNOSIS — M25.562 PAIN IN BOTH KNEES, UNSPECIFIED CHRONICITY: ICD-10-CM

## 2023-10-04 DIAGNOSIS — G89.29 CHRONIC PAIN OF BOTH KNEES: ICD-10-CM

## 2023-10-04 DIAGNOSIS — M25.561 CHRONIC PAIN OF BOTH KNEES: ICD-10-CM

## 2023-10-04 PROCEDURE — 97161 PT EVAL LOW COMPLEX 20 MIN: CPT | Mod: PN

## 2023-10-04 PROCEDURE — 97110 THERAPEUTIC EXERCISES: CPT | Mod: PN

## 2023-10-04 NOTE — PATIENT INSTRUCTIONS
"Hamstring Stretch (Seated)    Sit on a raised flat surface where you can prop your affected leg up on it such as a treatment table, couch or bed.       While keeping your knee straight to slightly bent, slowly lean forward and reach your hands towards your foot until a gentle stretch is felt along the back of your knee/thigh. Hold for 15 seconds and then return to starting position and repeat 3 times on each leg, twice daily.     Calf Stretch    While in a seated position, place a towel around the ball of your foot and pull your ankle back until a stretch is felt on your calf area.     Keep your knee in a straightened position during the stretch. Hold 15 seconds, 3 times, twice daily.     Heel Prop    While seated, prop your foot up on another chair and allow gravity to stretch your knee towards a more straightened position. Hold for 3 minutes at a time x 2. Slowly building your way up to holding for 10 minutes at a time. Perform 5-6 times daily.      Strengthening: Quadriceps Set    Tighten muscles on top of thighs by pushing knees down into surface. Roll a small towel roll and place it under your ankle. Hold 5-10 seconds.  Repeat 10 times right leg per set. Do 3 sets per session for a total of 30 repetitions. Do 4-5 sessions per day.    Straight Leg Raise     While lying on your back, flex your foot towards your nose, squeeze your quad and raise up your leg with a straight knee, KEEPING YOUR KNEE AS STRAIGHT AS POSSIBLE.  Keep the opposite knee bent with the foot planted on the ground.  Repeat 5 times left leg per set. Do 5 sets per session for a total of 25 times. Slowly working your way up to performing 10 sets, 3 times, for a total of 30 repetitions. Do 2 sessions per day.       Gluteal Bridges      While lying on your back with knees bent, tighten your lower abdominal muscles, squeeze your buttocks and then raise your buttocks off the floor/bed as creating a "Bridge" with your body. Hold and then lower " yourself and repeat.    Perform 3 sets of 10 repetitions, for a total of 30 times.     Sidelying Clamshells    While lying on your side with your knees bent and an elastic band wrapped around your knees, draw up the top knee while keeping contact of your feet together as shown.     Do not let your pelvis roll back during the lifting movement.      Perform 3 sets of 10 repetitions on each side.

## 2023-10-04 NOTE — PLAN OF CARE
[facility]   Physical Therapy Initial Evaluation      Name: Dayana Pina [facility] Number: [id number]    Therapy Diagnosis:   Encounter Diagnosis   Name Primary?    Pain in both knees, unspecified chronicity         Physician: Jd West DO    Physician Orders: PT Eval and Treat  Medical Diagnosis from Referral: M25.561,M25.562 (ICD-10-CM) - Pain in both knees, unspecified chronicity  Evaluation Date: 10/4/2023  Authorization Period Expiration: 9/24/2024  Plan of Care Expiration: 12/31/2023  Progress Note Due: 11/4/2023  Visit # / Visits authorized: 1/1   FOTO: 1/10    Precautions: HTN     Time In: 1:10 PM  Time Out: 2:14 PM  Total Appointment Time (timed & untimed codes): 64 minutes    Subjective     Date of onset: a few months ago    History of current condition - Dayana reports: earlier this year she was experiencing slight knee pain, but thought nothing of it. One morning she went to [employer] and walked in the [employer], but was unable to walk out. She had to be wheeled out in a wheelchair and taken to the emergency room. The pain was so severe she was unable to walk. States the pain has never been as severe as it was that day and it has not been that severe since, although she is still experiencing pain. States that the pain is bilateral and located in her anteromedial knees, left>right. Describes the pain as achy. Denies numbness/tingling. Endorses some clicking and popping but it is minimally painful. Current aggravating factors include: walking, standing after sitting for a long time, navigating stairs, and taking her first few steps in the morning. Current easing factors include: Tylenol and tumeric. Goals are to improve the pain and to be able to be more physically fit.     Falls: None    Imaging:   X-Ray Bilateral Flexion (9/25/2023):   FINDINGS:  Bones are well mineralized.  Genu varus bilaterally.  Severe narrowing of the medial compartment of the tibiofemoral joint  space bilaterally, left greater than right.  There is bone on bone appearance on the left and near bone on bone appearance on the right as seen on the erect view.  Patellofemoral joint spaces appear adequately maintained.  Mild spurring at the medial compartment bilaterally along with some spurring of the patella bilaterally.  No fracture, dislocation, or osseous destruction.  No definite evidence of joint effusion.    Prior Therapy: Not for currently complaint  Social History:  lives with their family; 1 story home, no stairs to enter  Occupation: Retired   Prior Level of Function: Intermittent bilateral knee pain  Current Level of Function: difficulty with ambulation, pain and avoidance of L knee    Pain:  Current 0/10, worst 6/10, best 0/10   Location: bilateral anteromedial knees    Description: Achy  Aggravating Factors: walking, standing after sitting for a long time, navigating stairs, and taking her first few steps in the morning  Easing Factors: Tylenol and tumeric    Patients goals: to improve the pain and to be able to be more physically fit.     Medical History:   Past Medical History:   Diagnosis Date    H/O tubal ligation 1978    H/O: hysterectomy 1986    Hyperlipidemia        Surgical History:   Dayana Su  has a past surgical history that includes Tubal ligation; Hysterectomy (01/01/1986); and Oophorectomy.    Medications:   Dayana has a current medication list which includes the following prescription(s): albuterol, aspirin, atorvastatin, biotin, budesonide-formoterol 160-4.5 mcg, calcium carbonate-vitamin d3 250-125 mg, doxycycline, estradiol, ezetimibe, fish oil-omega-3 fatty acids, fluticasone propionate, breatherite mdi spacer, loratadine, meloxicam, montelukast, multivitamin-calcium carb, pulse oximeter, trazodone, vitamin b complex, [DISCONTINUED] diclofenac sodium, and [DISCONTINUED] olopatadine.    Allergies:   Review of patient's allergies indicates:   Allergen Reactions     Pcn [penicillins] Hives        Objective      Functional Screen:   Gait Analysis: decreased stance time on LLE, does not push fully into knee extension on L, decreased heel strike on L, shortened step length.   Jogging: Not assessed  DL Squat: increased forward tibial translation bilaterally, increased trunk flexion, decreased depth  SL Squat: Not assessed  DL Heel Raise: decreased height bilaterally  SLS Stance:   R: >30 seconds   L: 23 seconds    Posture: Genu varus, stands with knee in increased flexion  Palpation: (+) tender to moderate palpation just medial to patella   Sensation: Intact    Range of Motion/Strength:       Hip Right Left Pain/Dysfunction with Movement   AROM/PROM      Flexion WNL WNL    Extension WNL WNL    Abduction WNL WNL    Adduction WNL WNL    Internal Rotation WNL WNL    External Rotation WNL WNL      Knee Right Left Pain/Dysfunction with Movement   AROM/PROM      Flexion  125/132  130/133    Extension  0/3  -5/0      Knee Girth Right Left   Joint Line 35 cm 35 cm     Ankle Right Left Pain/Dysfunction with Movement   AROM/PROM      dorsiflexion WNL WNL    plantarflexion WNL WNL    inversion WNL WNL    eversion WNL WNL      All below testing performed in seated position. Gait belt used for quadriceps testing.    Lower extremity strength with [employer] handheld dynamometer Right  (Kgf) Left  (Kgf) Pain/dysfunction with movement   (approx 4 sec hold w/ max contraction)    LSI%   Hip flexion 10.4 5.8 56%   Hip abduction 5.4 5.7 106%   Hip IR 4.2* 3.6 86%; Pain in lateral hip   Hip ER 5.8 3.6 62%   Quadriceps 14.4 8.2 57%   Hamstrings 8.6 2.4* 28%; Pain in posterior knee     Joint Mobility:   - Knee: limited lateral patellar glide on L as compared to R    Special Tests:     Test Right Left Pain/Dysfunction with Movement   Varus Stress Test (-) (-)    Valgus Stress Test (-) (-)        Intake Outcome Measure for FOTO Knee Survey    Therapist reviewed FOTO scores for Dayana Su on  "10/4/2023.   FOTO documents entered into [employer] - see Media section.    Intake Score: 58%         Treatment     Total Treatment time (time-based codes) separate from Evaluation: 25 minutes     Dayana received the treatments listed below:      Therapeutic exercises to develop strength, endurance, ROM, and flexibility for 20 minutes including:  Heel Prop: 5 minutes, large bolster, 2# above and below  Supine SLR's: 10x, B  DL Gluteal Bridges with ball adductor squeeze: 10x, 5" holds  Sidelying Clamshells: 10x, B, RTB   Long-Sit HS Stretch: reviewed  Long-Sit Gastroc Stretch: reviewed  Patient education    Neuromuscular re-education activities to improve: Coordination and Proprioception for 5 minutes. The following activities were included:  Quad Sets: 2x10, 5" holds, B, towel under ankle      Patient Education and Home Exercises     Education provided:   - Role and importance of physical therapy    Written Home Exercises Provided: yes. Exercises were reviewed and Dayana was able to demonstrate them prior to the end of the session.  Dayana demonstrated good  understanding of the education provided. See EMR under Patient Instructions for exercises provided during therapy sessions.    Assessment     Dayana is a 67 y.o. female referred to outpatient Physical Therapy with a medical diagnosis of Pain in both knees, unspecified chronicity. Pt presents to PT with pain, decreased left knee ROM, decreased strength and flexibility of bilateral lower extremities, poor posture, impaired balance and gait, and functional deficits with prolonged walking, standing after sitting for a long time, navigating stairs, and taking her first few steps in the morning. Patient presented with limited extension in seated and standing positions that was improved with heel propping. Tolerated initial treatment well with good understanding of education provided. Will heavily focus on improving core, hip abductors, quadriceps, and hamstring strength " as tolerated. Patient is very motivated and a good candidate for skilled physical therapy.    Patient prognosis is Good.   Patient will benefit from skilled outpatient Physical Therapy to address the deficits stated above and in the chart below, provide patient /family education, and to maximize patientt's level of independence.     Plan of care discussed with patient: Yes  Patient's spiritual, cultural and educational needs considered and patient is agreeable to the plan of care and goals as stated below:     Anticipated Barriers for therapy: COVID-19    Medical Necessity is demonstrated by the following  History  Co-morbidities and personal factors that may impact the plan of care [x] LOW: no personal factors / co-morbidities  [] MODERATE: 1-2 personal factors / co-morbidities  [] HIGH: 3+ personal factors / co-morbidities    Moderate / High Support Documentation:   Co-morbidities affecting plan of care: HTN    Personal Factors:   no deficits     Examination  Body Structures and Functions, activity limitations and participation restrictions that may impact the plan of care [x] LOW: addressing 1-2 elements  [] MODERATE: 3+ elements  [] HIGH: 4+ elements (please support below)    Moderate / High Support Documentation: N/A     Clinical Presentation [x] LOW: stable  [] MODERATE: Evolving  [] HIGH: Unstable     Decision Making/ Complexity Score: low       Goals:    Short Term Goals (4 weeks)   1. Patient will be independent with HEP to supplement therapy sessions.  2. Pt will improve LLE impaired LSI scores to greater than or equal to 70% to demonstrate balance in function between LEs.  3. Pt will improve L knee extension/hyperextension equal to opposite knee to demonstrate balance in function between LE's.  4. Pt will report less than 4/10 pain with prolonged ambulation to improve ability to ambulate within the community.     Long Term Goals (10 weeks)   1. Pt will be independent with updated HEP to supplement therapy  sessions.   2. Pt will improve LLE impaired LSI scores to greater than or equal to 90% to demonstrate balance in function between LEs.  3. Pt will improve knee ROM to equal bilaterally to improve mobility for functional tasks.  4. Pt will walk on unlevel surfaces without AD or gait deficits to promote ability to return back to walking for exercise.   5. Pt will return to PLOF with no pain.       Plan     Plan of care Certification: 10/4/2023 to 12/31/2023.    Outpatient Physical Therapy 2 times weekly for 10 weeks to include the following interventions: Electrical Stimulation , FDN prn, Gait Training, Manual Therapy, Moist Heat/ Ice, Neuromuscular Re-ed, Patient Education, Therapeutic Activities, Therapeutic Exercise, and Kinesiotape prn.     Lisha Cooper, PT

## 2023-10-11 ENCOUNTER — HOSPITAL ENCOUNTER (OUTPATIENT)
Dept: RADIOLOGY | Facility: HOSPITAL | Age: 68
Discharge: HOME | End: 2023-10-11
Attending: ORTHOPAEDIC SURGERY
Payer: MEDICARE

## 2023-10-11 ENCOUNTER — CLINICAL SUPPORT (OUTPATIENT)
Dept: REHABILITATION | Facility: HOSPITAL | Age: 68
End: 2023-10-11
Payer: MEDICARE

## 2023-10-11 DIAGNOSIS — M25.512 ACUTE PAIN OF LEFT SHOULDER: ICD-10-CM

## 2023-10-11 DIAGNOSIS — R29.898 WEAKNESS OF BOTH LOWER EXTREMITIES: ICD-10-CM

## 2023-10-11 DIAGNOSIS — M25.561 CHRONIC PAIN OF BOTH KNEES: Primary | ICD-10-CM

## 2023-10-11 DIAGNOSIS — G89.29 CHRONIC PAIN OF BOTH KNEES: Primary | ICD-10-CM

## 2023-10-11 DIAGNOSIS — Z74.09 IMPAIRED FUNCTIONAL MOBILITY, BALANCE, GAIT, AND ENDURANCE: ICD-10-CM

## 2023-10-11 DIAGNOSIS — M25.562 CHRONIC PAIN OF BOTH KNEES: Primary | ICD-10-CM

## 2023-10-11 PROCEDURE — 73221 MRI JOINT UPR EXTREM W/O DYE: CPT | Mod: TC,LT

## 2023-10-11 PROCEDURE — 73221 MRI SHOULDER WITHOUT CONTRAST LEFT: ICD-10-PCS | Mod: 26,LT,, | Performed by: RADIOLOGY

## 2023-10-11 PROCEDURE — 97110 THERAPEUTIC EXERCISES: CPT | Mod: PN

## 2023-10-11 PROCEDURE — 73221 MRI JOINT UPR EXTREM W/O DYE: CPT | Mod: 26,LT,, | Performed by: RADIOLOGY

## 2023-10-11 PROCEDURE — 97112 NEUROMUSCULAR REEDUCATION: CPT | Mod: PN

## 2023-10-11 NOTE — PROGRESS NOTES
"[facility]   Physical Therapy Treatment Note     Name: Dayana Pina [place]  Clinic Number: [id number]    Therapy Diagnosis:   Encounter Diagnoses   Name Primary?    Chronic pain of both knees Yes    Weakness of both lower extremities     Impaired functional mobility, balance, gait, and endurance      Physician: Jd West DO    Visit Date: 10/11/2023    Physician Orders: PT Eval and Treat  Medical Diagnosis from Referral: M25.561,M25.562 (ICD-10-CM) - Pain in both knees, unspecified chronicity  Evaluation Date: 10/4/2023  Authorization Period Expiration: 9/24/2024  Plan of Care Expiration: 12/31/2023  Progress Note Due: 11/4/2023  Visit # / Visits authorized: 1/20 (+Evaluation)  FOTO: 1/10    PTA Visit #: 0/5     Time In: 1:00 PM  Time Out: 2:00 PM  Total Billable Time: 60 minutes    Precautions: HTN    SUBJECTIVE     Pt reports: She's been experiencing increased pain in both of her knees today, but cannot attribute it to anything specific. Did some of her exercises, but not all.  She was compliant with home exercise program.  Response to previous treatment: Evaluation last session  Functional change: Evaluation last session    Pain: 3/10  Location: bilateral anterior knees      OBJECTIVE     Objective Measures updated at progress report unless specified.     Treatment     Dayana received the treatments listed below:      Therapeutic exercises to develop strength, endurance, and ROM for 47 minutes including:  Heel Prop: 5 minutes, large bolster, 3# above and below  Supine SLR's: 3x10, B  DL Gluteal Bridges: 3x10, 5" holds  Sidelying Clamshells: 3x10, B, GTB   DL Matrix Knee Extension: 2x10, 10#  DL Matrix HS Curls: 3x10, 15#  Patient education      Neuromuscular re-education activities to improve: Coordination and Proprioception for 8 minutes. The following activities were included:  Quad Sets: 3x10, 5" holds, B, towel under ankle  +TKE's: 30x, 5" holds, purple sport " cord    Therapeutic activities to improve functional performance for 5 minutes, including:  Sit to Stands: 3x10, 10# kettlebell at chest for one round        Patient Education and Home Exercises     Home Exercises Provided and Patient Education Provided     Education provided:   - Importance of continuing HEP to supplement therapy sessions.    Written Home Exercises Provided: Patient instructed to cont prior HEP. Exercises were reviewed and Dayana was able to demonstrate them prior to the end of the session.  Dayana demonstrated good  understanding of the education provided. See EMR under Patient Instructions for exercises provided during therapy sessions    ASSESSMENT     Dayana presented to therapy with increased knee pain but does not recall anything specific that she did differently to cause the increased pain. Continued with heel propping to assist with improving knee extension with good tolerance. Tolerated remainder of session well with increased repetitions from initial evaluation. Patient relayed that she believes performing the knee extension machine at the gym just prior to her initial pain, may have been what caused the pain. She was able to perform knee extension this session with a decrease in weight. Educated to monitor knee pain symptoms following today's session.     Dayana Is progressing well towards her goals.   Pt prognosis is Good.     Pt will continue to benefit from skilled outpatient physical therapy to address the deficits listed in the problem list box on initial evaluation, provide pt/family education and to maximize pt's level of independence in the home and community environment.     Pt's spiritual, cultural and educational needs considered and pt agreeable to plan of care and goals.     Anticipated barriers to physical therapy: COVID-19    Goals:   Short Term Goals (4 weeks)   1. Patient will be independent with HEP to supplement therapy sessions. Progressing, Not Met  2. Pt will  improve LLE impaired LSI scores to greater than or equal to 70% to demonstrate balance in function between LEs.Progressing, Not Met  3. Pt will improve L knee extension/hyperextension equal to opposite knee to demonstrate balance in function between LE's.Progressing, Not Met  4. Pt will report less than 4/10 pain with prolonged ambulation to improve ability to ambulate within the community. Progressing, Not Met     Long Term Goals (10 weeks)   1. Pt will be independent with updated HEP to supplement therapy sessions. Progressing, Not Met  2. Pt will improve LLE impaired LSI scores to greater than or equal to 90% to demonstrate balance in function between LEs.Progressing, Not Met  3. Pt will improve knee ROM to equal bilaterally to improve mobility for functional tasks.Progressing, Not Met  4. Pt will walk on unlevel surfaces without AD or gait deficits to promote ability to return back to walking for exercise. Progressing, Not Met  5. Pt will return to PLOF with no pain.     PLAN     Continue to improve ROM and quadriceps strength as tolerated.    Lisha Cooper, PT

## 2023-10-16 ENCOUNTER — TELEPHONE (OUTPATIENT)
Dept: SPORTS MEDICINE | Facility: CLINIC | Age: 68
End: 2023-10-16
Payer: MEDICARE

## 2023-10-16 NOTE — TELEPHONE ENCOUNTER
Attempted to call patient to go over MRI results.  Left voicemail to call back to discuss MRI results and plan.

## 2023-10-18 ENCOUNTER — CLINICAL SUPPORT (OUTPATIENT)
Dept: REHABILITATION | Facility: HOSPITAL | Age: 68
End: 2023-10-18
Payer: MEDICARE

## 2023-10-18 DIAGNOSIS — G89.29 CHRONIC PAIN OF BOTH KNEES: Primary | ICD-10-CM

## 2023-10-18 DIAGNOSIS — Z74.09 IMPAIRED FUNCTIONAL MOBILITY, BALANCE, GAIT, AND ENDURANCE: ICD-10-CM

## 2023-10-18 DIAGNOSIS — M25.561 CHRONIC PAIN OF BOTH KNEES: Primary | ICD-10-CM

## 2023-10-18 DIAGNOSIS — M25.562 CHRONIC PAIN OF BOTH KNEES: Primary | ICD-10-CM

## 2023-10-18 DIAGNOSIS — R29.898 WEAKNESS OF BOTH LOWER EXTREMITIES: ICD-10-CM

## 2023-10-18 PROCEDURE — 97110 THERAPEUTIC EXERCISES: CPT | Mod: PN

## 2023-10-18 PROCEDURE — 97112 NEUROMUSCULAR REEDUCATION: CPT | Mod: PN

## 2023-10-18 NOTE — PROGRESS NOTES
"[facility]   Physical Therapy Treatment Note     Name: Dayana Pina [place]  Clinic Number: [id number]    Therapy Diagnosis:   Encounter Diagnoses   Name Primary?    Chronic pain of both knees Yes    Weakness of both lower extremities     Impaired functional mobility, balance, gait, and endurance      Physician: Jd West DO    Visit Date: 10/18/2023    Physician Orders: PT Eval and Treat  Medical Diagnosis from Referral: M25.561,M25.562 (ICD-10-CM) - Pain in both knees, unspecified chronicity  Evaluation Date: 10/4/2023  Authorization Period Expiration: 9/24/2024  Plan of Care Expiration: 12/31/2023  Progress Note Due: 11/4/2023  Visit # / Visits authorized: 2/20 (+Evaluation)  FOTO: 3/10    PTA Visit #: 0/5     Time In: 1:04 PM  Time Out: 2:00 PM  Total Billable Time: 56 minutes    Precautions: HTN    SUBJECTIVE     Pt reports: She overall feels as though she's improving with both her pain and function. Feels that her knee is straighter.   She was compliant with home exercise program.  Response to previous treatment: No adverse reactions   Functional change: Ongoing    Pain: 3/10  Location: bilateral anterior knees      OBJECTIVE     Objective Measures updated at progress report unless specified.     Treatment     Dayana received the treatments listed below:      Therapeutic exercises to develop strength, endurance, and ROM for 47 minutes including:  Heel Prop: 5 minutes, large bolster, 3# above and below  Upright SLR's: 3x10, B  DL Gluteal Bridges: 3x10, 5" holds - d/c to HEP  Sidelying Clamshells: 3x10, B, GTB   DL Matrix Knee Extension: 2x10 @15#, 1x10 @ 20#  DL Matrix HS Curls: 3x10, 20#  +DL Shuttle Press: 20x, 50#  +SL Shuttle Press: 20x @ 37# for the RLE and 25# for LLE  Patient education      Neuromuscular re-education activities to improve: Coordination and Proprioception for 8 minutes. The following activities were included:  SL ball toss to trampoline on even ground: " 30x, B, red weighted ball      Therapeutic activities to improve functional performance for 5 minutes, including:  Sit to Stands: 3x10, weight bench height      Patient Education and Home Exercises     Home Exercises Provided and Patient Education Provided     Education provided:   - Importance of continuing HEP to supplement therapy sessions.    Written Home Exercises Provided: Patient instructed to cont prior HEP. Exercises were reviewed and Dayana was able to demonstrate them prior to the end of the session.  Dayana demonstrated good  understanding of the education provided. See EMR under Patient Instructions for exercises provided during therapy sessions    ASSESSMENT     Dayana presented to therapy with reports of improvements in function and pain since last week. Presented with significant improvements in knee extension ROM, with slight hyperextension noted. Progressed to full hyperextension following 10 minute heel prop. Able to progress difficulty of SLR's to an upright position and no extensor lag noted. Tolerated increases in resistance for both knee extension and HS curls. Progressed to more proprioception with SL ball toss to trampoline. Able to perform pain free on the L, but some pain noted on R and unable to perform full reps. Overall progressing very well and will continue to promote increased knee extension ROM and functional strength.      Dayana Is progressing well towards her goals.   Pt prognosis is Good.     Pt will continue to benefit from skilled outpatient physical therapy to address the deficits listed in the problem list box on initial evaluation, provide pt/family education and to maximize pt's level of independence in the home and community environment.     Pt's spiritual, cultural and educational needs considered and pt agreeable to plan of care and goals.     Anticipated barriers to physical therapy: COVID-19    Goals:   Short Term Goals (4 weeks)   1. Patient will be independent  with HEP to supplement therapy sessions. Progressing, Not Met  2. Pt will improve LLE impaired LSI scores to greater than or equal to 70% to demonstrate balance in function between LEs.Progressing, Not Met  3. Pt will improve L knee extension/hyperextension equal to opposite knee to demonstrate balance in function between LE's.Progressing, Not Met  4. Pt will report less than 4/10 pain with prolonged ambulation to improve ability to ambulate within the community. Progressing, Not Met     Long Term Goals (10 weeks)   1. Pt will be independent with updated HEP to supplement therapy sessions. Progressing, Not Met  2. Pt will improve LLE impaired LSI scores to greater than or equal to 90% to demonstrate balance in function between LEs.Progressing, Not Met  3. Pt will improve knee ROM to equal bilaterally to improve mobility for functional tasks.Progressing, Not Met  4. Pt will walk on unlevel surfaces without AD or gait deficits to promote ability to return back to walking for exercise. Progressing, Not Met  5. Pt will return to PLOF with no pain.     PLAN     Continue to improve ROM and quadriceps strength as tolerated.    Lisha Cooper, PT

## 2023-11-24 ENCOUNTER — OFFICE VISIT (OUTPATIENT)
Dept: PRIMARY CARE CLINIC | Facility: CLINIC | Age: 68
End: 2023-11-24
Payer: MEDICARE

## 2023-11-24 VITALS
DIASTOLIC BLOOD PRESSURE: 74 MMHG | SYSTOLIC BLOOD PRESSURE: 152 MMHG | WEIGHT: 136.88 LBS | OXYGEN SATURATION: 98 % | BODY MASS INDEX: 25.87 KG/M2 | HEART RATE: 72 BPM

## 2023-11-24 DIAGNOSIS — J06.9 UPPER RESPIRATORY TRACT INFECTION, UNSPECIFIED TYPE: Primary | ICD-10-CM

## 2023-11-24 PROCEDURE — 99214 OFFICE O/P EST MOD 30 MIN: CPT | Mod: S$GLB,,, | Performed by: NURSE PRACTITIONER

## 2023-11-24 PROCEDURE — 1159F MED LIST DOCD IN RCRD: CPT | Mod: CPTII,S$GLB,, | Performed by: NURSE PRACTITIONER

## 2023-11-24 PROCEDURE — 1125F PR PAIN SEVERITY QUANTIFIED, PAIN PRESENT: ICD-10-PCS | Mod: CPTII,S$GLB,, | Performed by: NURSE PRACTITIONER

## 2023-11-24 PROCEDURE — 3008F BODY MASS INDEX DOCD: CPT | Mod: CPTII,S$GLB,, | Performed by: NURSE PRACTITIONER

## 2023-11-24 PROCEDURE — 3008F PR BODY MASS INDEX (BMI) DOCUMENTED: ICD-10-PCS | Mod: CPTII,S$GLB,, | Performed by: NURSE PRACTITIONER

## 2023-11-24 PROCEDURE — 1125F AMNT PAIN NOTED PAIN PRSNT: CPT | Mod: CPTII,S$GLB,, | Performed by: NURSE PRACTITIONER

## 2023-11-24 PROCEDURE — 99999 PR PBB SHADOW E&M-EST. PATIENT-LVL III: ICD-10-PCS | Mod: PBBFAC,,, | Performed by: NURSE PRACTITIONER

## 2023-11-24 PROCEDURE — 3061F NEG MICROALBUMINURIA REV: CPT | Mod: CPTII,S$GLB,, | Performed by: NURSE PRACTITIONER

## 2023-11-24 PROCEDURE — 3061F PR NEG MICROALBUMINURIA RESULT DOCUMENTED/REVIEW: ICD-10-PCS | Mod: CPTII,S$GLB,, | Performed by: NURSE PRACTITIONER

## 2023-11-24 PROCEDURE — 3078F PR MOST RECENT DIASTOLIC BLOOD PRESSURE < 80 MM HG: ICD-10-PCS | Mod: CPTII,S$GLB,, | Performed by: NURSE PRACTITIONER

## 2023-11-24 PROCEDURE — 99999 PR PBB SHADOW E&M-EST. PATIENT-LVL III: CPT | Mod: PBBFAC,,, | Performed by: NURSE PRACTITIONER

## 2023-11-24 PROCEDURE — 99214 PR OFFICE/OUTPT VISIT, EST, LEVL IV, 30-39 MIN: ICD-10-PCS | Mod: S$GLB,,, | Performed by: NURSE PRACTITIONER

## 2023-11-24 PROCEDURE — 3078F DIAST BP <80 MM HG: CPT | Mod: CPTII,S$GLB,, | Performed by: NURSE PRACTITIONER

## 2023-11-24 PROCEDURE — 3077F SYST BP >= 140 MM HG: CPT | Mod: CPTII,S$GLB,, | Performed by: NURSE PRACTITIONER

## 2023-11-24 PROCEDURE — 3044F HG A1C LEVEL LT 7.0%: CPT | Mod: CPTII,S$GLB,, | Performed by: NURSE PRACTITIONER

## 2023-11-24 PROCEDURE — 3066F NEPHROPATHY DOC TX: CPT | Mod: CPTII,S$GLB,, | Performed by: NURSE PRACTITIONER

## 2023-11-24 PROCEDURE — 3077F PR MOST RECENT SYSTOLIC BLOOD PRESSURE >= 140 MM HG: ICD-10-PCS | Mod: CPTII,S$GLB,, | Performed by: NURSE PRACTITIONER

## 2023-11-24 PROCEDURE — 1159F PR MEDICATION LIST DOCUMENTED IN MEDICAL RECORD: ICD-10-PCS | Mod: CPTII,S$GLB,, | Performed by: NURSE PRACTITIONER

## 2023-11-24 PROCEDURE — 3066F PR DOCUMENTATION OF TREATMENT FOR NEPHROPATHY: ICD-10-PCS | Mod: CPTII,S$GLB,, | Performed by: NURSE PRACTITIONER

## 2023-11-24 PROCEDURE — 3044F PR MOST RECENT HEMOGLOBIN A1C LEVEL <7.0%: ICD-10-PCS | Mod: CPTII,S$GLB,, | Performed by: NURSE PRACTITIONER

## 2023-11-24 RX ORDER — AZITHROMYCIN 250 MG/1
TABLET, FILM COATED ORAL
Qty: 6 TABLET | Refills: 0 | Status: SHIPPED | OUTPATIENT
Start: 2023-11-24 | End: 2023-11-29

## 2023-11-24 NOTE — PROGRESS NOTES
[facility] Note    Chief Complaint      Chief Complaint   Patient presents with    Sore Throat    Cough       History of Present Illness      Dayana Su is a 67 y.o. female who presents today for: productive cough with green phlegm, sore throat, pnd, tired, chest congestion. X 5-6 days. Negative for Covid. Denies bodayaches. Trying coricidin HBP.      Past Medical History:  Past Medical History:   Diagnosis Date    H/O tubal ligation     H/O: hysterectomy     Hyperlipidemia        Past Surgical History:   has a past surgical history that includes Tubal ligation; Hysterectomy (1986); and Oophorectomy.    Family History:  family history includes Aneurysm in her sister; Heart disease in her father, mother, sister, and sister; Heart failure in her brother; Hypertension in her sister; Lupus in her sister.     Social History:  Social History     Tobacco Use    Smoking status: Former     Current packs/day: 0.00     Average packs/day: 0.3 packs/day for 30.0 years (7.5 ttl pk-yrs)     Types: Cigarettes     Start date: 1970     Quit date: 2000     Years since quittin.6    Smokeless tobacco: Never   Substance Use Topics    Alcohol use: No    Drug use: No       Review of Systems   Constitutional:  Positive for malaise/fatigue. Negative for chills and fever.   HENT:  Positive for congestion, sinus pain and sore throat.    Respiratory:  Positive for sputum production. Negative for cough and shortness of breath.    Cardiovascular:  Negative for chest pain and palpitations.   Gastrointestinal:  Negative for constipation, diarrhea, nausea and vomiting.   Genitourinary:  Negative for dysuria and hematuria.   Musculoskeletal:  Negative for falls.   Neurological:  Negative for headaches.        Medications:  Outpatient Encounter Medications as of 2023   Medication Sig Dispense Refill    albuterol (PROVENTIL/VENTOLIN HFA) 90 mcg/actuation inhaler Inhale 2 puffs into the  lungs every 4 (four) hours as needed for Wheezing. Rescue 54 g 1    aspirin 81 MG Chew Take 1 tablet (81 mg total) by mouth once daily. 30 tablet 11    atorvastatin (LIPITOR) 20 MG tablet TAKE 1 TABLET BY MOUTH EVERY DAY 90 tablet 3    azithromycin (Z-EH) 250 MG tablet Take 2 tablets by mouth on day 1; Take 1 tablet by mouth on days 2-5 6 tablet 0    biotin 1 mg tablet Take 1,000 mcg by mouth 3 (three) times daily.      budesonide-formoterol 160-4.5 mcg (SYMBICORT) 160-4.5 mcg/actuation HFAA Inhale 2 puffs into the lungs every 12 (twelve) hours. Controller 10.2 g 6    calcium carbonate-vitamin D3 250-125 mg 250-125 mg-unit Tab Take 1 tablet by mouth once.      doxycycline (VIBRA-TABS) 100 MG tablet One tablet twice daily with food and a full glass of water 20 tablet 0    ezetimibe (ZETIA) 10 mg tablet Take 1 tablet (10 mg total) by mouth once daily. 90 tablet 3    fish oil-omega-3 fatty acids 300-1,000 mg capsule Take by mouth once daily.      fluticasone propionate (FLONASE) 50 mcg/actuation nasal spray 2 sprays (100 mcg total) by Each Nostril route 2 (two) times daily. 16 mL 5    loratadine (CLARITIN) 10 mg tablet Take 1 tablet (10 mg total) by mouth once daily. 90 tablet 3    meloxicam (MOBIC) 15 MG tablet Take 1 tablet (15 mg total) by mouth once daily. Take on full stomach. 30 tablet 1    montelukast (SINGULAIR) 10 mg tablet Take 1 tablet (10 mg total) by mouth once daily. 90 tablet 3    multivitamin-calcium carb Chew Take by mouth.      pulse oximeter (PULSE OXIMETER) device by Apply Externally route 2 (two) times a day. Use twice daily at 8 AM and 3 PM and record the value in [facility] as directed. 1 each 0    traZODone (DESYREL) 100 MG tablet TAKE 1 TABLET BY MOUTH EVERY DAY IN THE EVENING 90 tablet 3    vitamin B complex (B COMPLEX-VITAMIN B12 ORAL) Take 5,000 mcg by mouth.      [DISCONTINUED] diclofenac sodium (VOLTAREN) 1 % Gel Apply 2 g topically 4 (four) times daily. 20 g 0    [DISCONTINUED] estradioL  (ESTRACE) 0.01 % (0.1 mg/gram) vaginal cream 1 g three times weekly (Patient not taking: Reported on 10/20/2022) 42.5 g 2    [DISCONTINUED] inhalation spacing device (BREATHERITE MDI SPACER) Use as directed for inhalation. (Patient not taking: Reported on 10/20/2022) 1 Device 0    [DISCONTINUED] olopatadine (PATANASE) 0.6 % nasal spray 1 spray by Each Nare route 2 (two) times daily. 1 Bottle 2     No facility-administered encounter medications on file as of 11/24/2023.       Allergies:  Review of patient's allergies indicates:   Allergen Reactions    Pcn [penicillins] Hives       Health Maintenance:  Immunization History   Administered Date(s) Administered    COVID-19, MRNA, LN-S, PF (Pfizer) (Purple Cap) 02/09/2021, 03/02/2021, 10/08/2021    Influenza (FLUAD) - Quadrivalent - Adjuvanted - PF *Preferred* (65+) 09/23/2020, 10/14/2020, 10/20/2022    Influenza (FLUAD) - Trivalent - Adjuvanted - PF (65+) 10/12/2017    Influenza - High Dose - PF (65 years and older) 11/25/2020    Influenza - Quadrivalent 10/15/2018    Influenza - Quadrivalent - MDCK - PF 10/14/2019    Influenza - Quadrivalent - PF *Preferred* (6 months and older) 11/23/2016, 09/26/2018    Influenza - Trivalent (ADULT) 10/26/2007    Pneumococcal Conjugate - 13 Valent 01/25/2021    Pneumococcal Polysaccharide - 23 Valent 04/25/2019    Tdap 01/15/2019      Health Maintenance   Topic Date Due    Shingles Vaccine (1 of 2) Never done    Colorectal Cancer Screening  01/01/2023    Mammogram  02/28/2024    DEXA Scan  12/22/2024    Lipid Panel  05/02/2028    TETANUS VACCINE  01/15/2029    Hepatitis C Screening  Completed        Physical Exam      Vital Signs  Pulse: 72  SpO2: 98 %  BP: (!) 152/74  BP Location: Right arm  Pain Score:   5  Pain Loc: Throat  Height and Weight  Weight: 62.1 kg (136 lb 14.5 oz)]    Physical Exam  Constitutional:       Appearance: She is well-developed.   HENT:      Head: Normocephalic and atraumatic.   Cardiovascular:      Rate and  Rhythm: Normal rate and regular rhythm.      Heart sounds: Normal heart sounds. No murmur heard.  Pulmonary:      Effort: Pulmonary effort is normal. No respiratory distress.      Breath sounds: Normal breath sounds.   Abdominal:      General: There is no distension.      Palpations: Abdomen is soft.      Tenderness: There is no abdominal tenderness. There is no guarding.   Skin:     General: Skin is warm and dry.   Neurological:      Mental Status: She is alert. Mental status is at baseline.   Psychiatric:         Behavior: Behavior normal.          Laboratory:  CBC:  Recent Labs   Lab 01/21/21  0000 09/29/21  0850 04/13/23  0706   WBC 6.4 7.1 5.7   RBC 4.46 4.79 4.44   Hemoglobin 13.0 14.0 12.9   Hematocrit 40.4 43.2 39.7   Platelets 273 314 262   MCV 90.6 90.2 89.4   MCH 29.1 29.2 29.1   MCHC 32.2 32.4 32.5     CMP:  Recent Labs   Lab 01/21/21  0000 09/29/21  0850 03/14/23  0737   Glucose 99 92 95   Calcium 9.6 9.8 9.8   Albumin 4.0 4.3 3.8   Total Protein 6.5 6.9 7.1   Sodium 144 141 144   Potassium 4.5 4.5 4.0   CO2 28 28 28   Chloride 108 105 109   BUN 6 L 6 L 6 L   Alkaline Phosphatase  --   --  63   ALT 16 19 28   AST 19 22 28   Total Bilirubin 0.5 0.6 0.6     URINALYSIS:  Recent Labs   Lab 03/03/23  1633   Color, UA Yellow   Specific Gravity, UA 1.010   pH, UA 6.0   Protein, UA Negative   Nitrite, UA Negative   Leukocytes, UA Negative      LIPIDS:  Recent Labs   Lab 01/21/21  0000 04/13/23  0706   TSH 1.75 3.07   HDL 46 L  --    Cholesterol 159  --    Triglycerides 114  --    LDL Cholesterol 92  --    HDL/Cholesterol Ratio 3.5  --    Non HDL Chol. (LDL+VLDL) 113  --      TSH:  Recent Labs   Lab 01/21/21  0000 04/13/23  0706   TSH 1.75 3.07     A1C:  Recent Labs   Lab 01/21/21  0000 09/29/21  0850 04/13/23  0706   Hemoglobin A1C 5.7 H 5.8 H 5.7 H       Assessment/Plan     Dayana Pina [place] is a 67 y.o.female with:    1. Upper respiratory tract infection, unspecified type  - azithromycin (Z-EH) 250 MG  tablet; Take 2 tablets by mouth on day 1; Take 1 tablet by mouth on days 2-5  Dispense: 6 tablet; Refill: 0   Continue flonase and mucinex.      Return to clinic if no improvement      Chronic conditions status updated as per HPI.  Other than changes above, cont current medications and maintain follow up with specialists.  No follow-ups on file.    Future Appointments   Date Time Provider Department Center   12/6/2023  2:45 PM Letha Pryor MD [facility] [place]       Adreinne Cotaya, FNP [facility]

## 2023-12-06 ENCOUNTER — TELEPHONE (OUTPATIENT)
Dept: RESEARCH | Facility: HOSPITAL | Age: 68
End: 2023-12-06
Payer: MEDICARE

## 2023-12-06 ENCOUNTER — OFFICE VISIT (OUTPATIENT)
Dept: SPORTS MEDICINE | Facility: CLINIC | Age: 68
End: 2023-12-06
Payer: MEDICARE

## 2023-12-06 VITALS
WEIGHT: 132.25 LBS | SYSTOLIC BLOOD PRESSURE: 107 MMHG | HEIGHT: 61 IN | BODY MASS INDEX: 24.97 KG/M2 | HEART RATE: 59 BPM | DIASTOLIC BLOOD PRESSURE: 67 MMHG

## 2023-12-06 DIAGNOSIS — M17.12 ARTHRITIS OF LEFT KNEE: Primary | ICD-10-CM

## 2023-12-06 PROCEDURE — 3044F HG A1C LEVEL LT 7.0%: CPT | Mod: CPTII,S$GLB,, | Performed by: ORTHOPAEDIC SURGERY

## 2023-12-06 PROCEDURE — 3066F NEPHROPATHY DOC TX: CPT | Mod: CPTII,S$GLB,, | Performed by: ORTHOPAEDIC SURGERY

## 2023-12-06 PROCEDURE — 3074F PR MOST RECENT SYSTOLIC BLOOD PRESSURE < 130 MM HG: ICD-10-PCS | Mod: CPTII,S$GLB,, | Performed by: ORTHOPAEDIC SURGERY

## 2023-12-06 PROCEDURE — 3061F NEG MICROALBUMINURIA REV: CPT | Mod: CPTII,S$GLB,, | Performed by: ORTHOPAEDIC SURGERY

## 2023-12-06 PROCEDURE — 99214 PR OFFICE/OUTPT VISIT, EST, LEVL IV, 30-39 MIN: ICD-10-PCS | Mod: S$GLB,,, | Performed by: ORTHOPAEDIC SURGERY

## 2023-12-06 PROCEDURE — 3044F PR MOST RECENT HEMOGLOBIN A1C LEVEL <7.0%: ICD-10-PCS | Mod: CPTII,S$GLB,, | Performed by: ORTHOPAEDIC SURGERY

## 2023-12-06 PROCEDURE — 3078F PR MOST RECENT DIASTOLIC BLOOD PRESSURE < 80 MM HG: ICD-10-PCS | Mod: CPTII,S$GLB,, | Performed by: ORTHOPAEDIC SURGERY

## 2023-12-06 PROCEDURE — 3066F PR DOCUMENTATION OF TREATMENT FOR NEPHROPATHY: ICD-10-PCS | Mod: CPTII,S$GLB,, | Performed by: ORTHOPAEDIC SURGERY

## 2023-12-06 PROCEDURE — 1126F AMNT PAIN NOTED NONE PRSNT: CPT | Mod: CPTII,S$GLB,, | Performed by: ORTHOPAEDIC SURGERY

## 2023-12-06 PROCEDURE — 99999 PR PBB SHADOW E&M-EST. PATIENT-LVL III: ICD-10-PCS | Mod: PBBFAC,,, | Performed by: ORTHOPAEDIC SURGERY

## 2023-12-06 PROCEDURE — 3061F PR NEG MICROALBUMINURIA RESULT DOCUMENTED/REVIEW: ICD-10-PCS | Mod: CPTII,S$GLB,, | Performed by: ORTHOPAEDIC SURGERY

## 2023-12-06 PROCEDURE — 99214 OFFICE O/P EST MOD 30 MIN: CPT | Mod: S$GLB,,, | Performed by: ORTHOPAEDIC SURGERY

## 2023-12-06 PROCEDURE — 1126F PR PAIN SEVERITY QUANTIFIED, NO PAIN PRESENT: ICD-10-PCS | Mod: CPTII,S$GLB,, | Performed by: ORTHOPAEDIC SURGERY

## 2023-12-06 PROCEDURE — 99999 PR PBB SHADOW E&M-EST. PATIENT-LVL III: CPT | Mod: PBBFAC,,, | Performed by: ORTHOPAEDIC SURGERY

## 2023-12-06 PROCEDURE — 3078F DIAST BP <80 MM HG: CPT | Mod: CPTII,S$GLB,, | Performed by: ORTHOPAEDIC SURGERY

## 2023-12-06 PROCEDURE — 3074F SYST BP LT 130 MM HG: CPT | Mod: CPTII,S$GLB,, | Performed by: ORTHOPAEDIC SURGERY

## 2023-12-06 NOTE — PROGRESS NOTES
CC: left Shoulder pain    67 y.o. Female reports that the pain is severe and not responding to any conservative care.      She reports that the pain is worse with overhead activity. It also bothers her at night.    SANE 40    Pain 5/10    Pain with her normal routine of exercising, not a specific event, end 2023      PAST MEDICAL HISTORY:   Past Medical History:   Diagnosis Date    H/O tubal ligation     H/O: hysterectomy     Hyperlipidemia      PAST SURGICAL HISTORY:   Past Surgical History:   Procedure Laterality Date    HYSTERECTOMY  1986    fibroids     OOPHORECTOMY      TUBAL LIGATION       FAMILY HISTORY:   Family History   Problem Relation Age of Onset    Heart disease Mother          at 46    Heart disease Father          at 67    Heart disease Sister          26    Heart disease Sister          33    Lupus Sister          at 40     Aneurysm Sister          at 38    Heart failure Brother     Hypertension Sister     Cancer Neg Hx     Diabetes Neg Hx      SOCIAL HISTORY:   Social History     Socioeconomic History    Marital status:    Tobacco Use    Smoking status: Former     Current packs/day: 0.00     Average packs/day: 0.3 packs/day for 30.0 years (7.5 ttl pk-yrs)     Types: Cigarettes     Start date: 1970     Quit date: 2000     Years since quittin.6    Smokeless tobacco: Never   Substance and Sexual Activity    Alcohol use: No    Drug use: No    Sexual activity: Not Currently       MEDICATIONS:   Current Outpatient Medications:     albuterol (PROVENTIL/VENTOLIN HFA) 90 mcg/actuation inhaler, Inhale 2 puffs into the lungs every 4 (four) hours as needed for Wheezing. Rescue, Disp: 54 g, Rfl: 1    aspirin 81 MG Chew, Take 1 tablet (81 mg total) by mouth once daily., Disp: 30 tablet, Rfl: 11    atorvastatin (LIPITOR) 20 MG tablet, TAKE 1 TABLET BY MOUTH EVERY DAY, Disp: 90 tablet, Rfl: 3    biotin 1 mg tablet, Take 1,000 mcg by mouth 3 (three)  "times daily., Disp: , Rfl:     budesonide-formoterol 160-4.5 mcg (SYMBICORT) 160-4.5 mcg/actuation HFAA, Inhale 2 puffs into the lungs every 12 (twelve) hours. Controller, Disp: 10.2 g, Rfl: 6    calcium carbonate-vitamin D3 250-125 mg 250-125 mg-unit Tab, Take 1 tablet by mouth once., Disp: , Rfl:     doxycycline (VIBRA-TABS) 100 MG tablet, One tablet twice daily with food and a full glass of water, Disp: 20 tablet, Rfl: 0    ezetimibe (ZETIA) 10 mg tablet, Take 1 tablet (10 mg total) by mouth once daily., Disp: 90 tablet, Rfl: 3    fish oil-omega-3 fatty acids 300-1,000 mg capsule, Take by mouth once daily., Disp: , Rfl:     fluticasone propionate (FLONASE) 50 mcg/actuation nasal spray, 2 sprays (100 mcg total) by Each Nostril route 2 (two) times daily., Disp: 16 mL, Rfl: 5    loratadine (CLARITIN) 10 mg tablet, Take 1 tablet (10 mg total) by mouth once daily., Disp: 90 tablet, Rfl: 3    meloxicam (MOBIC) 15 MG tablet, Take 1 tablet (15 mg total) by mouth once daily. Take on full stomach., Disp: 30 tablet, Rfl: 1    montelukast (SINGULAIR) 10 mg tablet, Take 1 tablet (10 mg total) by mouth once daily., Disp: 90 tablet, Rfl: 3    multivitamin-calcium carb Chew, Take by mouth., Disp: , Rfl:     pulse oximeter (PULSE OXIMETER) device, by Apply Externally route 2 (two) times a day. Use twice daily at 8 AM and 3 PM and [facility]., Disp: 1 each, Rfl: 0    traZODone (DESYREL) 100 MG tablet, TAKE 1 TABLET BY MOUTH EVERY DAY IN THE EVENING, Disp: 90 tablet, Rfl: 3    vitamin B complex (B COMPLEX-VITAMIN B12 ORAL), Take 5,000 mcg by mouth., Disp: , Rfl:   ALLERGIES:   Review of patient's allergies indicates:   Allergen Reactions    Pcn [penicillins] Hives       VITAL SIGNS: /67   Pulse (!) 59   Ht 5' 1" (1.549 m)   Wt 60 kg (132 lb 4.4 oz)   BMI 24.99 kg/m²      Review of Systems   Constitution: Negative. Negative for chills, fever and night sweats.   HENT: Negative for congestion and " headaches.    Eyes: Negative for blurred vision, left vision loss and right vision loss.   Cardiovascular: Negative for chest pain and syncope.   Respiratory: Negative for cough and shortness of breath.    Endocrine: Negative for polydipsia, polyphagia and polyuria.   Hematologic/Lymphatic: Negative for bleeding problem. Does not bruise/bleed easily.   Skin: Negative for dry skin, itching and rash.   Musculoskeletal: Negative for falls and muscle weakness.   Gastrointestinal: Negative for abdominal pain and bowel incontinence.   Genitourinary: Negative for bladder incontinence and nocturia.   Neurological: Negative for disturbances in coordination, loss of balance and seizures.   Psychiatric/Behavioral: Negative for depression. The patient does not have insomnia.    Allergic/Immunologic: Negative for hives and persistent infections.       PHYSICAL EXAMINATION:  General:  The patient is alert and oriented x 3.  Mood is pleasant.  Observation of ears, eyes and nose reveal no gross abnormalities.  HEENT: NCAT, sclera nonicteric  Lungs: Respirations are equal and unlabored.  Gait is coordinated. Patient can toe walk and heel walk without difficulty.  Cardiovascular: There are no swelling or varicosities present.   Lymphatic: Negative for adenopathy       left SHOULDER / UPPER EXTREMITY EXAM    OBSERVATION:     Swelling  none  Deformity  none   Discoloration  none   Scapular winging none   Scars   none  Atrophy  none    TENDERNESS / CREPITUS (T/C):          T/C      T/C   Clavicle   -/-  SUPRAspinatus    -/-   AC Jt.    -/-  INFRAspinatus  -/-   SC Jt.    -/-  Deltoid    -/-   G. Tuberosity  -/-  LH BICEP groove  +/-   Acromion:  -/-  Midline Neck   -/-   Scapular Spine -/-  Trapezium   -/-   SMA Scapula  -/-  GH jt. line - post  -/-   Scapulothoracic  -/-         ROM: (* = with pain)  Right shoulder   Left shoulder        AROM (PROM)   AROM (PROM)   FE    170° (175°)     170° (175°)     ER at 0°    60°  (65°)    60°   (65°)   ER at 90° ABD  90°  (90°)    90°  (90°)   IR at 90°  ABD   NA  (40°)     NA  (40°)      IR (spine level)   T10     T10    STRENGTH: (* = with pain) RIGHT SHOULDER  LEFT SHOULDER   SCAPTION at 0   5/5    4/5    SCAPTION at 30   5/5    5/5    IR    5/5    5/5   ER    5/5    5/5   BICEPS   5/5    5/5   Deltoid    5/5    5/5     SIGNS:  Painful side       NEER   +    OOMIS  +   DEAN   +    SPEEDS  +   DROP ARM   neg   BELLY PRESS Neg   Superior escape none    LIFT-OFF  Neg   X-Body ADD    neg    MOVING VALGUS Neg      STABILITY TESTING    RIGHT SHOULDER   LEFT SHOULDER       Translation    Anterior  up face     up face    Posterior  up face    up face    Sulcus   < 10mm    < 10 mm    Signs    Apprehension   neg      Neg    Relocation   no change     no change    Jerk test  neg     Neg      EXTREMITY NEURO-VASCULAR EXAM    Sensation grossly intact to light touch all dermatomal regions.    DTR 2+ Biceps, Triceps, BR and Negative Brandos sign   Grossly intact motor function at Elbow, Wrist and Hand   Distal pulses radial and ulnar 2+, brisk cap refill, symmetric.      NECK:  Painless FROM and spinous processes non-tender. Negative Spurlings sign.      OTHER FINDINGS:    XRAYS:  Shoulder trauma series left,  were ordered and reviewed and interpreted by me. No convincing fracture or dislocation is noted. The osseous structures appear well mineralized and well aligned, possible insufficiency fracture greater tuberosity/cystic changes, AC -, degenerative changes    left   1. Shoulder pain,possible RTC tear, complicated   2.  Possible insufficiency fracture greater tuberosity    Plan:       ASSESSMENT:  shoulder pain.    MRI    Probable subchondral insufficiency fracture of the lesser tuberosity of the humerus     Partial-thickness interstitial subscapularis tendon tear with associated cystic changes within the lesser tuberosity.     Low-grade glenohumeral chondromalacia and SLAP tear      Subacromial/subdeltoid bursal thickening/bursitis.         Will proceed conservatively for now as she is doing well      CC: Bilateral knee pain    67 y.o. Female with a history of Bilateral knee pain.  Reports its been going on for a while.        She states that the pain has improved but still is weak. Reports she has tried tumeric which she thinks helped.  Has not tried injections or PT.       + mechanical symptoms, no instability    Is affecting ADLs.      SANE L : 50  SANE R: 75  VAS 5/10    Review of Systems   Constitution: Negative. Negative for chills, fever and night sweats.   HENT: Negative for congestion and headaches.    Eyes: Negative for blurred vision, left vision loss and right vision loss.   Cardiovascular: Negative for chest pain and syncope.   Respiratory: Negative for cough and shortness of breath.    Endocrine: Negative for polydipsia, polyphagia and polyuria.   Hematologic/Lymphatic: Negative for bleeding problem. Does not bruise/bleed easily.   Skin: Negative for dry skin, itching and rash.   Musculoskeletal: Negative for falls. Positive for knee pain and muscle weakness.   Gastrointestinal: Negative for abdominal pain and bowel incontinence.   Genitourinary: Negative for bladder incontinence and nocturia.   Neurological: Negative for disturbances in coordination, loss of balance and seizures.   Psychiatric/Behavioral: Negative for depression. The patient does not have insomnia.    Allergic/Immunologic: Negative for hives and persistent infections.     PAST MEDICAL HISTORY:   Past Medical History:   Diagnosis Date    H/O tubal ligation     H/O: hysterectomy     Hyperlipidemia      PAST SURGICAL HISTORY:   Past Surgical History:   Procedure Laterality Date    HYSTERECTOMY  1986    fibroids     OOPHORECTOMY      TUBAL LIGATION       FAMILY HISTORY:   Family History   Problem Relation Age of Onset    Heart disease Mother          at 46    Heart disease Father          at  67    Heart disease Sister          26    Heart disease Sister          33    Lupus Sister          at 40     Aneurysm Sister          at 38    Heart failure Brother     Hypertension Sister     Cancer Neg Hx     Diabetes Neg Hx      SOCIAL HISTORY:   Social History     Socioeconomic History    Marital status:    Tobacco Use    Smoking status: Former     Current packs/day: 0.00     Average packs/day: 0.3 packs/day for 30.0 years (7.5 ttl pk-yrs)     Types: Cigarettes     Start date: 1970     Quit date: 2000     Years since quittin.6    Smokeless tobacco: Never   Substance and Sexual Activity    Alcohol use: No    Drug use: No    Sexual activity: Not Currently       MEDICATIONS:   Current Outpatient Medications:     albuterol (PROVENTIL/VENTOLIN HFA) 90 mcg/actuation inhaler, Inhale 2 puffs into the lungs every 4 (four) hours as needed for Wheezing. Rescue, Disp: 54 g, Rfl: 1    aspirin 81 MG Chew, Take 1 tablet (81 mg total) by mouth once daily., Disp: 30 tablet, Rfl: 11    atorvastatin (LIPITOR) 20 MG tablet, TAKE 1 TABLET BY MOUTH EVERY DAY, Disp: 90 tablet, Rfl: 3    biotin 1 mg tablet, Take 1,000 mcg by mouth 3 (three) times daily., Disp: , Rfl:     budesonide-formoterol 160-4.5 mcg (SYMBICORT) 160-4.5 mcg/actuation HFAA, Inhale 2 puffs into the lungs every 12 (twelve) hours. Controller, Disp: 10.2 g, Rfl: 6    calcium carbonate-vitamin D3 250-125 mg 250-125 mg-unit Tab, Take 1 tablet by mouth once., Disp: , Rfl:     doxycycline (VIBRA-TABS) 100 MG tablet, One tablet twice daily with food and a full glass of water, Disp: 20 tablet, Rfl: 0    ezetimibe (ZETIA) 10 mg tablet, Take 1 tablet (10 mg total) by mouth once daily., Disp: 90 tablet, Rfl: 3    fish oil-omega-3 fatty acids 300-1,000 mg capsule, Take by mouth once daily., Disp: , Rfl:     fluticasone propionate (FLONASE) 50 mcg/actuation nasal spray, 2 sprays (100 mcg total) by Each Nostril route 2 (two) times daily.,  "Disp: 16 mL, Rfl: 5    loratadine (CLARITIN) 10 mg tablet, Take 1 tablet (10 mg total) by mouth once daily., Disp: 90 tablet, Rfl: 3    meloxicam (MOBIC) 15 MG tablet, Take 1 tablet (15 mg total) by mouth once daily. Take on full stomach., Disp: 30 tablet, Rfl: 1    montelukast (SINGULAIR) 10 mg tablet, Take 1 tablet (10 mg total) by mouth once daily., Disp: 90 tablet, Rfl: 3    multivitamin-calcium carb Chew, Take by mouth., Disp: , Rfl:     pulse oximeter (PULSE OXIMETER) device, by Apply Externally route 2 (two) times a day. Use twice daily at 8 AM and 3 PM and record the value in MyChart as directed., Disp: 1 each, Rfl: 0    traZODone (DESYREL) 100 MG tablet, TAKE 1 TABLET BY MOUTH EVERY DAY IN THE EVENING, Disp: 90 tablet, Rfl: 3    vitamin B complex (B COMPLEX-VITAMIN B12 ORAL), Take 5,000 mcg by mouth., Disp: , Rfl:   ALLERGIES:   Review of patient's allergies indicates:   Allergen Reactions    Pcn [penicillins] Hives       VITAL SIGNS: /67   Pulse (!) 59   Ht 5' 1" (1.549 m)   Wt 60 kg (132 lb 4.4 oz)   BMI 24.99 kg/m²      PHYSICAL EXAMINATION  VITAL SIGNS: /67   Pulse (!) 59   Ht 5' 1" (1.549 m)   Wt 60 kg (132 lb 4.4 oz)   BMI 24.99 kg/m²    General:  The patient is alert and oriented x 3.  Mood is pleasant.  Observation of ears, eyes and nose reveal no gross abnormalities.  HEENT: NCAT, sclera nonicteric  Lungs: Respirations are equal and unlabored.    Left KNEE EXAMINATION     OBSERVATION / INSPECTION   Gait:   Nonantalgic   Alignment:  Neutral   Scars:   None   Muscle atrophy: Mild  Effusion:  None   Warmth:  None   Discoloration:   none     TENDERNESS / CREPITUS (T / C):          T / C      T / C   Patella   - / -   Lateral joint line   - / -    Peripatellar medial  +  Medial joint line    + / -    Peripatellar lateral +  Medial plica   - / -    Patellar tendon -   Popliteal fossa  - / -    Quad tendon   -   Gastrocnemius   -   Prepatellar Bursa - / -   Quadricep   -   Tibial " tubercle  -  Thigh/hamstring  -   Pes anserine/HS -  Fibula    -   ITB   - / -  Tibia     -   Tib/fib joint  - / -  LCL    -     MFC   - / -   MCL: Proximal  -    LFC   - / -    Distal   -          ROM: (* = pain)  PASSIVE   ACTIVE    Left :   5 / 0 / 145   5 / 0 / 145     Right :    5 / 0 / 145   5 / 0 / 145    PATELLOFEMORAL EXAMINATION:  See above noted areas of tenderness.   Patella position    Subluxation / dislocation: Centered           Sup. / Inf;   Normal   Crepitus (PF):    Absent   Patellar Mobility:       Medial-lateral:   Normal    Superior-inferior:  Normal    Inferior tilt   Normal    Patellar tendon:  Normal   Lateral tilt:    Normal   J-sign:     None   Patellofemoral grind:   No pain       MENISCAL SIGNS:     Pain on terminal extension:  +  Pain on terminal flexion:  +  Yamiles maneuver:  + for pain  Squat     + posterior joint pain    LIGAMENT EXAMINATION:  ACL / Lachman:  normal (-1 to 2mm)    PCL-Post.  drawer: normal 0 to 2mm  MCL- Valgus:  normal 0 to 2mm  LCL- Varus:  normal 0 to 2mm  Pivot shift: normal (Equal)   Dial Test: difference c/w other side   At 30° flexion: normal (< 5°)    At 90° flexion: normal (< 5°)   Reverse Pivot Shift:   normal (Equal)     STRENGTH: (* = with pain) PAINFUL SIDE   Quadricep   5/5   Hamstrin/5    Right KNEE EXAMINATION     OBSERVATION / INSPECTION   Gait:   Nonantalgic   Alignment:  Neutral   Scars:   None   Muscle atrophy: Mild  Effusion:  None   Warmth:  None   Discoloration:   none     TENDERNESS / CREPITUS (T / C):          T / C      T / C   Patella   - / -   Lateral joint line   - / -    Peripatellar medial  -  Medial joint line    + / -    Peripatellar lateral -  Medial plica   - / -    Patellar tendon -   Popliteal fossa  - / -    Quad tendon   -   Gastrocnemius   -   Prepatellar Bursa - / -   Quadricep   -   Tibial tubercle  -  Thigh/hamstring  -   Pes anserine/HS -  Fibula    -   ITB   - / -  Tibia     -   Tib/fib joint  - /  -  LCL    -     MFC   - / -   MCL: Proximal  -    LFC   - / -    Distal   -          ROM: (* = pain)  PASSIVE   ACTIVE    Left :   5 / 0 / 145   5 / 0 / 145     Right :    5 / 0 / 145   5 / 0 / 145    PATELLOFEMORAL EXAMINATION:  See above noted areas of tenderness.   Patella position    Subluxation / dislocation: Centered           Sup. / Inf;   Normal   Crepitus (PF):    Absent   Patellar Mobility:       Medial-lateral:   Normal    Superior-inferior:  Normal    Inferior tilt   Normal    Patellar tendon:  Normal   Lateral tilt:    Normal   J-sign:     None   Patellofemoral grind:   No pain       MENISCAL SIGNS:     Pain on terminal extension:  +  Pain on terminal flexion:  +  Yamiles maneuver:  + for pain  Squat     + posterior joint pain    LIGAMENT EXAMINATION:  ACL / Lachman:  normal (-1 to 2mm)    PCL-Post.  drawer: normal 0 to 2mm  MCL- Valgus:  normal 0 to 2mm  LCL- Varus:  normal 0 to 2mm  Pivot shift: normal (Equal)   Dial Test: difference c/w other side   At 30° flexion: normal (< 5°)    At 90° flexion: normal (< 5°)   Reverse Pivot Shift:   normal (Equal)     STRENGTH: (* = with pain) PAINFUL SIDE   Quadricep   5/5   Hamstrin/5    EXTREMITY NEURO-VASCULAR EXAMINATION:   Sensation:  Grossly intact to light touch all dermatomal regions.   Motor Function:  Fully intact motor function at hip, knee, foot and ankle    DTRs;  quadriceps and  achilles 2+.  No clonus and downgoing Babinski.    Vascular status:  DP and PT pulses 2+, brisk capillary refill, symmetric.     Other Findings:       X-rays reviewed and interpreted personally by me:  including standing, weight bearing AP and flexion bilateral knees, lateral and merchant views ordered and images reviewed by me show:  No fracture, dislocation, medial compartment narrowing     ASSESSMENT:    Bilateral Knee OA     PLAN:   Continue mobic daily, discussed tylenol arthritis 650mg, 2 tablets in the am and 2 in the pm.   Order placed for PT again  (prior PT quit at [facility])  Will see her back PRN  All questions were answered, pt will contact us for questions or concerns in the interim.    I made the decision to obtain old records of the patient including previous notes and imaging. New imaging was ordered today of the extremity or extremities evaluated. I independently reviewed and interpreted the radiographs and/or MRIs today as well as prior imaging.

## 2023-12-19 ENCOUNTER — CLINICAL SUPPORT (OUTPATIENT)
Dept: REHABILITATION | Facility: HOSPITAL | Age: 68
End: 2023-12-19
Attending: ORTHOPAEDIC SURGERY
Payer: MEDICARE

## 2023-12-19 DIAGNOSIS — R29.898 WEAKNESS OF BOTH LOWER EXTREMITIES: ICD-10-CM

## 2023-12-19 DIAGNOSIS — Z74.09 IMPAIRED FUNCTIONAL MOBILITY, BALANCE, GAIT, AND ENDURANCE: ICD-10-CM

## 2023-12-19 DIAGNOSIS — M25.561 CHRONIC PAIN OF BOTH KNEES: Primary | ICD-10-CM

## 2023-12-19 DIAGNOSIS — G89.29 CHRONIC PAIN OF BOTH KNEES: Primary | ICD-10-CM

## 2023-12-19 DIAGNOSIS — M17.12 ARTHRITIS OF LEFT KNEE: ICD-10-CM

## 2023-12-19 DIAGNOSIS — M25.562 CHRONIC PAIN OF BOTH KNEES: Primary | ICD-10-CM

## 2023-12-19 PROCEDURE — 97161 PT EVAL LOW COMPLEX 20 MIN: CPT

## 2023-12-19 PROCEDURE — 97110 THERAPEUTIC EXERCISES: CPT

## 2023-12-19 PROCEDURE — 97112 NEUROMUSCULAR REEDUCATION: CPT

## 2023-12-19 NOTE — PLAN OF CARE
[facility]   Physical Therapy Initial Evaluation      Name: Dayana Pina Washington  Clinic Number: [id number]    Therapy Diagnosis:   Encounter Diagnoses   Name Primary?    Arthritis of left knee     Chronic pain of both knees Yes    Weakness of both lower extremities     Impaired functional mobility, balance, gait, and endurance         Physician: Letha Pryor MD    Physician Orders: PT Eval and Treat   Medical Diagnosis from Referral: M17.12 (ICD-10-CM) - Arthritis of left knee  Evaluation Date: 12/19/2023  Authorization Period Expiration: 12/5/2024  Plan of Care Expiration: 2/19/2024  Progress Note Due: 2/19/2024  Visit # / Visits authorized: 1/ 1   FOTO: 1/3    Precautions: Standard     Time In: 1500  Time Out: 1600  Total Appointment Time (timed & untimed codes): 60 minutes    Subjective     Date of onset: September 2023    History of current condition - Dayana reports:     Patient reports insidious onset of L > R knee pain that worsened around September 2023 w/o known KENNY or MOA. She first noticed increased pain when she went to [facility] and was unable to walk out of [facility] due to pain. Since then she has managed pain with Tylenol and Tumeric. She had trial of PT in October, but then stopped after her therapist left the clinic. Since then she has continued to pain that affects ADLs jasmyn all weightbearing activities.     Goals are to reduce pain and improve donald to ADLs.    Falls: 0    Imaging:     X-ray:    FINDINGS:  Bones are well mineralized.  Genu varus bilaterally.  Severe narrowing of the medial compartment of the tibiofemoral joint space bilaterally, left greater than right.  There is bone on bone appearance on the left and near bone on bone appearance on the right as seen on the erect view.  Patellofemoral joint spaces appear adequately maintained.  Mild spurring at the medial compartment bilaterally along with some spurring of the patella bilaterally.  No fracture, dislocation,  or osseous destruction.  No definite evidence of joint effusion.     Impression:     DJD and alignment abnormalities as detailed above    Prior Therapy: yes  Social History: lives with their family  Occupation: Retired  Prior Level of Function: ADLs w/o complaint jasmyn Taoist activities  Current Level of Function: pain with weightbearing and affecting ADLs    Pain:  Current 6/10, worst 8/10, best 4/10   Location: L > R  Description: Aching, Dull, Tight, and Sharp  Aggravating Factors: Standing, Walking, and Night Time  Easing Factors: rest and Sitting / tumeric / tylenol    Patients goals: ADLs w/o complaint     Medical History:   Past Medical History:   Diagnosis Date    H/O tubal ligation 1978    H/O: hysterectomy 1986    Hyperlipidemia        Surgical History:   Dayana Su  has a past surgical history that includes Tubal ligation; Hysterectomy (01/01/1986); and Oophorectomy.    Medications:   Dayana has a current medication list which includes the following prescription(s): albuterol, aspirin, atorvastatin, biotin, budesonide-formoterol 160-4.5 mcg, calcium carbonate-vitamin d3 250-125 mg, doxycycline, ezetimibe, fish oil-omega-3 fatty acids, fluticasone propionate, loratadine, meloxicam, montelukast, multivitamin-calcium carb, pulse oximeter, trazodone, vitamin b complex, [DISCONTINUED] diclofenac sodium, and [DISCONTINUED] olopatadine.    Allergies:   Review of patient's allergies indicates:   Allergen Reactions    Pcn [penicillins] Hives        Objective      Observation / Gait: antalgic with excessive lateral sway    Posture: genu varum    Palpation: TTP joint line / PFJ of L knee    Sensation: WNL    Edema: mild    Range of Motion (Active):   Knee Right  Left    Flexion 125 120   Extension +10 +3     Lower Extremity Strength:  Right LE  Left LE    Quadriceps: 4/5 Quadriceps: 4-/5 * pain   Hamstrings: 4/5 Hamstrings: 4-/5   Hip ABD: 3/5 Hip ABD: 3-/5     Joint Mobility: hypomobility of L PFJ and  AP of tibia on femur     Flexibility:    90/90 Hamstrings: R = + ; L = +    Dee's test: R = + ; L = -   Kennedy test: R = np ; L = np   Prone knee bend (RF): R = +++; L = +++++    Special Tests:   Right Left   Patellar Grind Test - +++       Limitation/Restriction for FOTO Knee Survey    Therapist reviewed FOTO scores for Dayana Su on 12/19/2023.   FOTO documents entered into [facility] - see Media section.    Limitation Score: see media%         Treatment     Total Treatment time (time-based codes) separate from Evaluation: 25 minutes     Dayana received the treatments listed below:      therapeutic exercises to develop strength, endurance, ROM, flexibility, posture, and core stabilization for 10 minutes including:    Patient education:  - diagnosis, prognosis, impairments  - importance of consistency with HEP  - HEP in detail    Prone quad 3x30 sec ea gladys    NEXT VISIT - cont quad stretching at beginning and end of visit, SLR, Clam, Bridge, Bike    neuromuscular re-education activities to improve: Balance, Coordination, Kinesthetic, Sense, Proprioception, and Posture for 15 minutes. The following activities were included:    Patient education:  - importance of quad and hip musculature in presentation    SLR w/ DF 3x10x5 sec  Clam 3x10x5 sec    therapeutic activities to improve functional performance for 00  minutes, including:    NT    Patient Education and Home Exercises     Education provided:   - see above    Written Home Exercises Provided: yes. Exercises were reviewed and Dayana was able to demonstrate them prior to the end of the session.  Dayana demonstrated good  understanding of the education provided. See EMR under Patient Instructions for exercises provided during therapy sessions.    Assessment     Dayana is a 67 y.o. female referred to outpatient Physical Therapy with a medical diagnosis of M17.12 (ICD-10-CM) - Arthritis of left knee. Patient presents with pain, mild swelling, genu varum  posture, decreased L > R knee ROM, joint hypomobility, quad flexibility deficits, and significant quad > hip NM activation and strength deficits. Impairments contributory to pain affecting ADLs jasmyn weightbearing activities.    Patient prognosis is Fair.   Patient will benefit from skilled outpatient Physical Therapy to address the deficits stated above and in the chart below, provide patient /family education, and to maximize patientt's level of independence.     Plan of care discussed with patient: Yes  Patient's spiritual, cultural and educational needs considered and patient is agreeable to the plan of care and goals as stated below:     Anticipated Barriers for therapy: chronicity / severity    Medical Necessity is demonstrated by the following  History  Co-morbidities and personal factors that may impact the plan of care [] LOW: no personal factors / co-morbidities  [x] MODERATE: 1-2 personal factors / co-morbidities  [] HIGH: 3+ personal factors / co-morbidities    Moderate / High Support Documentation:   Co-morbidities affecting plan of care: chronicity / severity    Personal Factors:   no deficits     Examination  Body Structures and Functions, activity limitations and participation restrictions that may impact the plan of care [] LOW: addressing 1-2 elements  [] MODERATE: 3+ elements  [x] HIGH: 4+ elements (please support below)    Moderate / High Support Documentation: pain, mild swelling, genu varum posture, decreased L > R knee ROM, joint hypomobility, quad flexibility deficits, and significant quad > hip NM activation and strength deficits     Clinical Presentation [x] LOW: stable  [] MODERATE: Evolving  [] HIGH: Unstable     Decision Making/ Complexity Score: low       GOALS: Short Term Goals:  0-4 weeks  1.Report decreased L > R knee pain  < / =  4/10  to increase tolerance for ADLs  2. Increase knee ROM to  deg in order to be able to perform ADLs without difficulty.  3. Increase strength by 1/3  MMT grade in quad / hip abd  to increase tolerance for ADL and work activities.  4. Pt to tolerate HEP to improve ROM and independence with ADL's    Long Term Goals: 4-8 weeks  1.Report decreased L > R knee pain < / = 2/10  to increase tolerance for ADLs  2.Patient goal: ADLs w/o complaint  3.Increase strength to >/= 4+/5 in quad / hip abd  to increase tolerance for ADL and work activities.  4. Pt will report at CJ level (20-40% impaired) on FOTO knee to demonstrate increase in LE function with every day tasks.     Plan     Plan of care Certification: 12/19/2023 to 2/19/2024.    Outpatient Physical Therapy 2 times weekly for 8 weeks to include the following interventions: Manual Therapy, Moist Heat/ Ice, Neuromuscular Re-ed, Patient Education, Self Care, Therapeutic Activities, and Therapeutic Exercise.     INOCENCIO ORO, PT, DPT, OCS

## 2023-12-27 ENCOUNTER — CLINICAL SUPPORT (OUTPATIENT)
Dept: REHABILITATION | Facility: HOSPITAL | Age: 68
End: 2023-12-27
Payer: MEDICARE

## 2023-12-27 DIAGNOSIS — R29.898 WEAKNESS OF BOTH LOWER EXTREMITIES: ICD-10-CM

## 2023-12-27 DIAGNOSIS — M25.561 CHRONIC PAIN OF BOTH KNEES: Primary | ICD-10-CM

## 2023-12-27 DIAGNOSIS — G89.29 CHRONIC PAIN OF BOTH KNEES: Primary | ICD-10-CM

## 2023-12-27 DIAGNOSIS — M25.562 CHRONIC PAIN OF BOTH KNEES: Primary | ICD-10-CM

## 2023-12-27 DIAGNOSIS — Z74.09 IMPAIRED FUNCTIONAL MOBILITY, BALANCE, GAIT, AND ENDURANCE: ICD-10-CM

## 2023-12-27 PROCEDURE — 97112 NEUROMUSCULAR REEDUCATION: CPT

## 2023-12-27 PROCEDURE — 97530 THERAPEUTIC ACTIVITIES: CPT

## 2023-12-27 NOTE — PROGRESS NOTES
[facility]   Physical Therapy Treatment Note      Name: Dayana Pina Washington  Clinic Number: [id number]    Therapy Diagnosis:   Encounter Diagnoses   Name Primary?    Chronic pain of both knees Yes    Weakness of both lower extremities     Impaired functional mobility, balance, gait, and endurance      Physician: Jd West DO    Visit Date: 12/27/2023    Physician Orders: PT Eval and Treat   Medical Diagnosis from Referral: M17.12 (ICD-10-CM) - Arthritis of left knee  Evaluation Date: 12/19/2023  Authorization Period Expiration: 12/5/2024  Plan of Care Expiration: 2/19/2024  Progress Note Due: 2/19/2024  Visit # / Visits authorized: 1/ 1; Visit 2  FOTO: 1/3     Precautions: Standard      Time In: 0800  Time Out: 0900  Total Appointment Time (timed & untimed codes): 60 minutes - all one on one    Subjective     Pt reports: Pain still at night or when sedentary for prolonged period of time. Feeling improvements in ability to walk.    She was compliant with home exercise program.  Response to previous treatment: HEP  Functional change: ongoing    Pain: 4/10  Location: L > R knee pain    Objective      Objective Measures updated at progress report unless specified.     Lower Extremity Strength:  Right LE   Left LE     Quadriceps: 4/5 Quadriceps: 4-/5 * pain   Hamstrings: 4/5 Hamstrings: 4-/5   Hip ABD: 3/5 Hip ABD: 3-/5     Prone knee bend (RF): R = +++; L = +++++     Treatment     Dayana received the treatments listed below:      therapeutic exercises to develop strength, endurance, ROM, flexibility, posture, and core stabilization for 5 minutes including:    Patient education:  - HEP     Prone quad 3x30 sec ea gladys at beginning and end of visit     NEXT VISIT - cont quad stretching at beginning and end of visit and bike jasmyn -> progress to shuttle if able     neuromuscular re-education activities to improve: Balance, Coordination, Kinesthetic, Sense, Proprioception, and Posture 45  minutes. The following activities were included:     Patient education:  - importance of quad and hip musculature in presentation     Clam 3x10 ea gladys  SLR w/ DF 3x10x5 sec  Bridge 3x10-15  SAQ 3# 3x10 ea gladys       therapeutic activities to improve functional performance for 10  minutes, including:     Recumbent bike x10 min Lvl 5.0 for joint mobility, CV, and LE endurance    Patient Education and Home Exercises       Education provided:   - see above    Written Home Exercises Provided: Patient instructed to cont prior HEP. Exercises were reviewed and Dayana was able to demonstrate them prior to the end of the session.  Dayana demonstrated good  understanding of the education provided. See EMR under Patient Instructions for exercises provided during therapy sessions    Assessment     Patient presents with pain, mild swelling, genu varum posture, decreased L > R knee ROM, joint hypomobility, quad flexibility deficits, and significant quad > hip NM activation and strength deficits. Impairments contributory to pain affecting ADLs jasmyn weightbearing activities.     Improving quad flexibility and good challenge to quad / hip NM activation with HEP. Good donald to progressions within visit. Will cont to require focus jasmyn on quad flexibility and quad strengthening to help improve donald to functional weightbearing activities.    Dayana Is progressing well towards her goals.   Pt prognosis is Fair.     Pt will continue to benefit from skilled outpatient physical therapy to address the deficits listed in the problem list box on initial evaluation, provide pt/family education and to maximize pt's level of independence in the home and community environment.     Pt's spiritual, cultural and educational needs considered and pt agreeable to plan of care and goals.     Anticipated barriers to physical therapy: chronicity / severity     GOALS: Short Term Goals:  0-4 weeks  1.Report decreased L > R knee pain  < / =  4/10  to increase  tolerance for ADLs  2. Increase knee ROM to  deg in order to be able to perform ADLs without difficulty.  3. Increase strength by 1/3 MMT grade in quad / hip abd  to increase tolerance for ADL and work activities.  4. Pt to tolerate HEP to improve ROM and independence with ADL's     Long Term Goals: 4-8 weeks  1.Report decreased L > R knee pain < / = 2/10  to increase tolerance for ADLs  2.Patient goal: ADLs w/o complaint  3.Increase strength to >/= 4+/5 in quad / hip abd  to increase tolerance for ADL and work activities.  4. Pt will report at CJ level (20-40% impaired) on FOTO knee to demonstrate increase in LE function with every day tasks.     Plan     Plan of care Certification: 12/19/2023 to 2/19/2024.     Outpatient Physical Therapy 2 times weekly for 8 weeks to include the following interventions: Manual Therapy, Moist Heat/ Ice, Neuromuscular Re-ed, Patient Education, Self Care, Therapeutic Activities, and Therapeutic Exercise.     INOCENCIO ORO, PT, DPT, OCS

## 2024-01-12 ENCOUNTER — CLINICAL SUPPORT (OUTPATIENT)
Dept: REHABILITATION | Facility: HOSPITAL | Age: 69
End: 2024-01-12
Payer: MEDICARE

## 2024-01-12 DIAGNOSIS — M25.561 CHRONIC PAIN OF BOTH KNEES: Primary | ICD-10-CM

## 2024-01-12 DIAGNOSIS — M25.562 CHRONIC PAIN OF BOTH KNEES: Primary | ICD-10-CM

## 2024-01-12 DIAGNOSIS — R29.898 WEAKNESS OF BOTH LOWER EXTREMITIES: ICD-10-CM

## 2024-01-12 DIAGNOSIS — G89.29 CHRONIC PAIN OF BOTH KNEES: Primary | ICD-10-CM

## 2024-01-12 DIAGNOSIS — Z74.09 IMPAIRED FUNCTIONAL MOBILITY, BALANCE, GAIT, AND ENDURANCE: ICD-10-CM

## 2024-01-12 PROCEDURE — 97112 NEUROMUSCULAR REEDUCATION: CPT

## 2024-01-12 PROCEDURE — 97530 THERAPEUTIC ACTIVITIES: CPT

## 2024-01-12 NOTE — PROGRESS NOTES
[facility]   Physical Therapy Treatment Note      Name: Dayana Pina Washington  Clinic Number: [id number]    Therapy Diagnosis:   Encounter Diagnoses   Name Primary?    Chronic pain of both knees Yes    Weakness of both lower extremities     Impaired functional mobility, balance, gait, and endurance        Physician: Jd West DO    Visit Date: 1/12/2024    Physician Orders: PT Eval and Treat   Medical Diagnosis from Referral: M17.12 (ICD-10-CM) - Arthritis of left knee  Evaluation Date: 12/19/2023  Authorization Period Expiration: 12/5/2024  Plan of Care Expiration: 2/19/2024  Progress Note Due: 2/19/2024  Visit # / Visits authorized: 1/ 1; Visit 3; 1/20 per referral  FOTO: 1/3     Precautions: Standard      Time In: 1330  Time Out: 1430  Total Appointment Time (timed & untimed codes): 60 minutes - 60 all one on one    Subjective     Pt reports: Feels like L knee is getting better, but R knee is a little more painful. She thinks it may be because she is favoring in for weightbearing activities. Cont consistency with HEP.    FOTO IE - 48%  FOTO 1/12/24 - 54%  FOTO Goal - 67%    She was compliant with home exercise program.  Response to previous treatment: HEP  Functional change: ongoing    Pain: 4/10  Location: L > R knee pain    Objective      Objective Measures updated at progress report unless specified.     Lower Extremity Strength:  Right LE   Left LE     Quadriceps: 4/5 Quadriceps: 4-/5 * pain   Hamstrings: 4/5 Hamstrings: 4-/5   Hip ABD: 3/5 Hip ABD: 3-/5     Prone knee bend (RF): R = +++; L = +++++     Treatment     Dayana received the treatments listed below:      therapeutic exercises to develop strength, endurance, ROM, flexibility, posture, and core stabilization for 00 minutes including:    Patient education:  - HEP     Prone quad 3x30 sec ea gladys at beginning and end of visit     NEXT VISIT - cont quad stretching and loading     neuromuscular re-education activities to  improve: Balance, Coordination, Kinesthetic, Sense, Proprioception, and Posture 35 minutes. The following activities were included:     Patient education:  - importance of quad and hip musculature in presentation  - increasing reps to 3x12 at minimum for all interventions     Knee extension matrix DL 4x10 15lbs  Knee flexion matrix DL 4x10 15lbs  TKE red band L knee x3 min with 3 sec holds  SLR w/ DF 12x5 sec ea gladys (previously 3x10x5 sec)  Clam 3x12 ea gladys    Review:  Bridge 3x10-15    Held:  SAQ 3# 3x10 ea gladys       therapeutic activities to improve functional performance for 25 minutes, including:     Recumbent bike x9 min Lvl 5.0 for joint mobility, CV, and LE endurance  Shuttle DL press 2B->3B 4x10 total  Shuttle SL press 1B1R 3x10 ea gladys       Patient Education and Home Exercises       Education provided:   - see above    Written Home Exercises Provided: Patient instructed to cont prior HEP. Exercises were reviewed and Dayana was able to demonstrate them prior to the end of the session.  Dayana demonstrated good  understanding of the education provided. See EMR under Patient Instructions for exercises provided during therapy sessions    Assessment     Patient presents with pain, mild swelling, genu varum posture, decreased L > R knee ROM, joint hypomobility, quad flexibility deficits, and significant quad > hip NM activation and strength deficits. Impairments contributory to pain affecting ADLs jasmyn weightbearing activities.     Improving function per FOTO scores slowly over time. Able to donald LE functional loading on the shuttle well and without complaint. Patient felt slight improvement in ability to amb following this likely secondary to improved NM activation post loading.     Cont to progress as able jasmyn with focus on quad strength / loading.    Dayana Is progressing well towards her goals.   Pt prognosis is Fair.     Pt will continue to benefit from skilled outpatient physical therapy to address the  deficits listed in the problem list box on initial evaluation, provide pt/family education and to maximize pt's level of independence in the home and community environment.     Pt's spiritual, cultural and educational needs considered and pt agreeable to plan of care and goals.     Anticipated barriers to physical therapy: chronicity / severity     GOALS: Short Term Goals:  0-4 weeks  1.Report decreased L > R knee pain  < / =  4/10  to increase tolerance for ADLs  2. Increase knee ROM to  deg in order to be able to perform ADLs without difficulty.  3. Increase strength by 1/3 MMT grade in quad / hip abd  to increase tolerance for ADL and work activities.  4. Pt to tolerate HEP to improve ROM and independence with ADL's     Long Term Goals: 4-8 weeks  1.Report decreased L > R knee pain < / = 2/10  to increase tolerance for ADLs  2.Patient goal: ADLs w/o complaint  3.Increase strength to >/= 4+/5 in quad / hip abd  to increase tolerance for ADL and work activities.  4. Pt will report at CJ level (20-40% impaired) on FOTO knee to demonstrate increase in LE function with every day tasks.     Plan     Plan of care Certification: 12/19/2023 to 2/19/2024.     Outpatient Physical Therapy 2 times weekly for 8 weeks to include the following interventions: Manual Therapy, Moist Heat/ Ice, Neuromuscular Re-ed, Patient Education, Self Care, Therapeutic Activities, and Therapeutic Exercise.     INOCENCIO ORO, PT, DPT, OCS

## 2024-01-16 ENCOUNTER — TELEPHONE (OUTPATIENT)
Dept: RESEARCH | Facility: HOSPITAL | Age: 69
End: 2024-01-16
Payer: MEDICARE

## 2024-01-19 ENCOUNTER — CLINICAL SUPPORT (OUTPATIENT)
Dept: REHABILITATION | Facility: HOSPITAL | Age: 69
End: 2024-01-19
Payer: MEDICARE

## 2024-01-19 DIAGNOSIS — M25.562 CHRONIC PAIN OF BOTH KNEES: Primary | ICD-10-CM

## 2024-01-19 DIAGNOSIS — Z74.09 IMPAIRED FUNCTIONAL MOBILITY, BALANCE, GAIT, AND ENDURANCE: ICD-10-CM

## 2024-01-19 DIAGNOSIS — M25.561 CHRONIC PAIN OF BOTH KNEES: Primary | ICD-10-CM

## 2024-01-19 DIAGNOSIS — R29.898 WEAKNESS OF BOTH LOWER EXTREMITIES: ICD-10-CM

## 2024-01-19 DIAGNOSIS — G89.29 CHRONIC PAIN OF BOTH KNEES: Primary | ICD-10-CM

## 2024-01-19 PROCEDURE — 97112 NEUROMUSCULAR REEDUCATION: CPT

## 2024-01-19 PROCEDURE — 97530 THERAPEUTIC ACTIVITIES: CPT

## 2024-01-19 NOTE — PROGRESS NOTES
[facility]   Physical Therapy Treatment Note      Name: Dayana Pina Washington  Clinic Number: [id number]    Therapy Diagnosis:   Encounter Diagnoses   Name Primary?    Chronic pain of both knees Yes    Weakness of both lower extremities     Impaired functional mobility, balance, gait, and endurance      Physician: Jd West DO    Visit Date: 1/19/2024    Physician Orders: PT Eval and Treat   Medical Diagnosis from Referral: M17.12 (ICD-10-CM) - Arthritis of left knee  Evaluation Date: 12/19/2023  Authorization Period Expiration: 12/5/2024  Plan of Care Expiration: 2/19/2024  Progress Note Due: 2/19/2024  Visit # / Visits authorized: 1/ 1; Visit 4; 2/20 per referral   FOTO: 1/3     Precautions: Standard      Time In: 1335  Time Out: 1430  Total Appointment Time (timed & untimed codes): 55 minutes - 55 all one on one    Subjective     Pt reports: Knees have been feeling good. Made it to the gym this past week, which felt good. Just came from bowling, which did not aggravate knees.    FOTO IE - 48%  FOTO 1/12/24 - 54%  FOTO Goal - 67%    She was compliant with home exercise program.  Response to previous treatment: HEP  Functional change: ongoing    Pain: 4/10  Location: L > R knee pain    Objective      Objective Measures updated at progress report unless specified.     Lower Extremity Strength:  Right LE   Left LE     Quadriceps: 4/5 Quadriceps: 4-/5 * pain   Hamstrings: 4/5 Hamstrings: 4-/5   Hip ABD: 3/5 Hip ABD: 3-/5     Prone knee bend (RF): R = +++; L = +++++     Treatment     Dayana received the treatments listed below:      therapeutic exercises to develop strength, endurance, ROM, flexibility, posture, and core stabilization for 00 minutes including:    Patient education:  - HEP     Prone quad 3x30 sec ea gladys at beginning and end of visit     NEXT VISIT - cont quad stretching and loading     neuromuscular re-education activities to improve: Balance, Coordination, Kinesthetic,  Sense, Proprioception, and Posture 30 minutes. The following activities were included:     Patient education:  - importance of quad and hip musculature in presentation  - increasing reps to 3x12 at minimum for all interventions     Knee extension matrix DL 4x10 19lbs  Knee flexion matrix DL 4x10 19lbs  Bridge w/ YTB 3x10   Bridge adduction 4lbs 3x10  Clam w/ YTB 3x12 ea gladys    Review:  TKE red band L knee x3 min with 3 sec holds  SLR w/ DF 12x5 sec ea gladys (previously 3x10x5 sec)    Held:  SAQ 3# 3x10 ea gladys       therapeutic activities to improve functional performance for 25 minutes, including:     Recumbent bike x8 min Lvl 4.0 for joint mobility, CV, and LE endurance  Shuttle SL press 2B 3x10-15 ea gladys  Shuttle DL press 2B->3B1R 3x10 total  YTB lateral walks at table x7 laps       Patient Education and Home Exercises       Education provided:   - see above    Written Home Exercises Provided: Patient instructed to cont prior HEP. Exercises were reviewed and Dayana was able to demonstrate them prior to the end of the session.  Dayana demonstrated good  understanding of the education provided. See EMR under Patient Instructions for exercises provided during therapy sessions    Assessment     Patient presents with pain, mild swelling, genu varum posture, decreased L > R knee ROM, joint hypomobility, quad flexibility deficits, and significant quad > hip NM activation and strength deficits. Impairments contributory to pain affecting ADLs jasmyn weightbearing activities.     Cont good donald to interventions jasmyn following NM activation of quads on knee extension matrix and with shuttle. Able to donald increased challenge as well with respect to resistance. Will cont to require consistency with strengthening outside of clinic to maximize function.    Cont to progress as able jasmyn with focus on quad strength / loading.    Dayana Is progressing well towards her goals.   Pt prognosis is Fair.     Pt will continue to benefit from  skilled outpatient physical therapy to address the deficits listed in the problem list box on initial evaluation, provide pt/family education and to maximize pt's level of independence in the home and community environment.     Pt's spiritual, cultural and educational needs considered and pt agreeable to plan of care and goals.     Anticipated barriers to physical therapy: chronicity / severity     GOALS: Short Term Goals:  0-4 weeks  1.Report decreased L > R knee pain  < / =  4/10  to increase tolerance for ADLs  2. Increase knee ROM to  deg in order to be able to perform ADLs without difficulty.  3. Increase strength by 1/3 MMT grade in quad / hip abd  to increase tolerance for ADL and work activities.  4. Pt to tolerate HEP to improve ROM and independence with ADL's     Long Term Goals: 4-8 weeks  1.Report decreased L > R knee pain < / = 2/10  to increase tolerance for ADLs  2.Patient goal: ADLs w/o complaint  3.Increase strength to >/= 4+/5 in quad / hip abd  to increase tolerance for ADL and work activities.  4. Pt will report at CJ level (20-40% impaired) on FOTO knee to demonstrate increase in LE function with every day tasks.     Plan     Plan of care Certification: 12/19/2023 to 2/19/2024.     Outpatient Physical Therapy 2 times weekly for 8 weeks to include the following interventions: Manual Therapy, Moist Heat/ Ice, Neuromuscular Re-ed, Patient Education, Self Care, Therapeutic Activities, and Therapeutic Exercise.     INOCENCIO ORO, PT, DPT, OCS

## 2024-01-22 ENCOUNTER — RESEARCH ENCOUNTER (OUTPATIENT)
Dept: RESEARCH | Facility: HOSPITAL | Age: 69
End: 2024-01-22
Payer: MEDICARE

## 2024-01-22 NOTE — PROGRESS NOTES
Sponsor: [employer]     Study Title/IRB Number: [id number]    Principle Investigator: Dr. Óscar Champion    Patient eligibility was checked prior to enrollment in the study. Patient met the following inclusion and exclusion criteria:     INCLUSION CRITERIA  Participant age is 50 years or older at the time of signing the Informed Consent form  Participant is capable of giving signed Informed Consent, which includes compliance with the requirements and restrictions in the informed consent form and the protocol    EXCLUSION CRITERIA  Participant is undergoing or referred for diagnostic evaluation due to clinical suspicion for cancer  Participant has a personal history of invasive or hematologic malignancy, diagnosed within the last 3 years prior to expected enrollment date or diagnosed greater than 3 years prior to expected enrollment date and never treated  Participant has had definitive treatment for invasive or hematologic malignancy within the 3 years prior to expected enrollment date  Participant is not able to comply with protocol procedures  Participant is not a current patient at a participating center  Participant is currently enrolled or was previously enrolled in another [employer]-sponsored study  Participant is current or previous employee/contractor of [employer]  Participant is currently pregnant (by participant's self-report of pregnancy status)    Prior to the Informed Consent (IC) being signed, or any study protocol required data collection, testing, procedure, or intervention being performed, the following was done and/or discussed:  Patient was given a copy of the IC for review   Purpose of the study and qualifications to participate   Study design, Follow up schedule, and tests or procedures done at each visit  Confidentiality and HIPAA Authorization for Release of Medical Records for the research trial/ subject's rights/research related injury  Risk, Benefits, Alternative Treatments, Compensation and  "Costs  Participation in the research trial is voluntary and patient may withdraw at anytime  Contact information for study related questions    Patient verbalizes understanding of the above: Yes  Contact information for CRC and PI given to patient: Yes  Patient able to adequately summarize: the purpose of the study, the risks associated with the study, and all procedures, testing, and follow-ups associated with the study: Yes    Patient signed the informed consent form for the research study with an IRB approval date of 4/25/2022. Each page of the consent form was reviewed with patient and all questions answered satisfactorily.   Patient received a copy of the consent form. The original consent was scanned into electronic medical records.    Anthropometric Data    Participant is a 68 y.o., Black or , Not  or /a, female  Participant height: 5'1"     Participant weight: 130 lbs      Smoking History and Alcohol use     Please indicate whether the participant smoked at least 100 cigarettes in their lifetime:   Yes  Please indicate whether the participant is a current smoker:  No  Age participant started smoking cigarettes:  15 Year.  Age participant stopped smoking cigarettes:  49 years.  During their time as a smoker, please indicate if the participant stopped smoking for a month or more:  3 Month  Please indicate how many cigarettes per day did the participant smoke on average for the majority of their time as a smoker: Blank single: 5    During the last 12 months, how often did the participant have any kind of drink containing alcohol? Count as one drink a 12 ounce can or glass of beer, a 5 ounce glass of wine, or a drink containing 1 shot of liquor. Participant has never consumed alcohol before during their life}  During the last 12 months, how many drinks did the participant have on days when they drank alcohol?Not Applicable    Blood Draw    Following IC being signed and prior to " blood draw, patient completed all baseline/pretest questionnaires. The following specimens were collected from the pt at the time of this encounter via peripheral blood draw.    Blood draw location: Right Arm  Needle used: 21 gauge butterfly needle  Blood draw amount: 40ml  Blood draw time: 2:09pm 1/22/2024

## 2024-01-23 ENCOUNTER — CLINICAL SUPPORT (OUTPATIENT)
Dept: REHABILITATION | Facility: HOSPITAL | Age: 69
End: 2024-01-23
Payer: MEDICARE

## 2024-01-23 DIAGNOSIS — R29.898 WEAKNESS OF BOTH LOWER EXTREMITIES: ICD-10-CM

## 2024-01-23 DIAGNOSIS — Z74.09 IMPAIRED FUNCTIONAL MOBILITY, BALANCE, GAIT, AND ENDURANCE: ICD-10-CM

## 2024-01-23 DIAGNOSIS — M25.561 CHRONIC PAIN OF BOTH KNEES: Primary | ICD-10-CM

## 2024-01-23 DIAGNOSIS — M25.562 CHRONIC PAIN OF BOTH KNEES: Primary | ICD-10-CM

## 2024-01-23 DIAGNOSIS — G89.29 CHRONIC PAIN OF BOTH KNEES: Primary | ICD-10-CM

## 2024-01-23 PROCEDURE — 97530 THERAPEUTIC ACTIVITIES: CPT

## 2024-01-23 PROCEDURE — 97112 NEUROMUSCULAR REEDUCATION: CPT

## 2024-01-23 NOTE — PROGRESS NOTES
[facility]   Physical Therapy Treatment Note      Name: Dayana Pina Washington  Clinic Number: [id number]    Therapy Diagnosis:   Encounter Diagnoses   Name Primary?    Chronic pain of both knees Yes    Weakness of both lower extremities     Impaired functional mobility, balance, gait, and endurance        Physician: Jd West DO    Visit Date: 1/23/2024    Physician Orders: PT Eval and Treat   Medical Diagnosis from Referral: M17.12 (ICD-10-CM) - Arthritis of left knee  Evaluation Date: 12/19/2023  Authorization Period Expiration: 12/5/2024  Plan of Care Expiration: 2/19/2024  Progress Note Due: 2/19/2024  Visit # / Visits authorized: 1/ 1; Visit 5; 3/20 per referral   FOTO: 1/3     Precautions: Standard      Time In: 1500  Time Out: 1555  Total Appointment Time (timed & untimed codes): 55 minutes - 55 all one on one    Subjective     Pt reports: Again able to go to the gym yesterday and felt good following this. Today awoke with R shoulder / neck pain, but otherwise no new complaints to LE today.    FOTO IE - 48%  FOTO 1/12/24 - 54%  FOTO Goal - 67%    She was compliant with home exercise program.  Response to previous treatment: HEP  Functional change: ongoing    Pain: 4/10  Location: L > R knee pain    Objective      Objective Measures updated at progress report unless specified.     Lower Extremity Strength:  Right LE   Left LE     Quadriceps: 4/5 Quadriceps: 4-/5 * pain   Hamstrings: 4/5 Hamstrings: 4-/5   Hip ABD: 3/5 Hip ABD: 3-/5     Prone knee bend (RF): R = +++; L = +++++     Treatment     Dayana received the treatments listed below:      therapeutic exercises to develop strength, endurance, ROM, flexibility, posture, and core stabilization for 00 minutes including:    Patient education:  - HEP     Prone quad 3x30 sec ea gladys at beginning and end of visit     NEXT VISIT - cont quad stretching and loading       neuromuscular re-education activities to improve: Balance,  Coordination, Kinesthetic, Sense, Proprioception, and Posture 30 minutes. The following activities were included:     Patient education:  - importance of quad and hip musculature in presentation  - increasing reps to 3x12 at minimum for all interventions     Knee extension matrix DL 4x10 19lbs  Knee flexion matrix DL 4x10 19lbs    Bridge w/ YTB 3x10   Bridge adduction 4lbs 3x10  Clam w/ YTB 3x12-15 ea gladys      Held:  TKE red band L knee x3 min with 3 sec holds  SLR w/ DF 12x5 sec ea gladys (previously 3x10x5 sec)  SAQ 3# 3x10 ea gladys       therapeutic activities to improve functional performance for 25 minutes, including:     Recumbent bike x10 min Lvl 4.0 for joint mobility, CV, and LE endurance    Shuttle SL press 2B 3x10-15 ea gladys  Shuttle DL press 3B1R 3x10-15 total  YTB lateral walks at table x7 laps       Patient Education and Home Exercises       Education provided:   - see above    Written Home Exercises Provided: Patient instructed to cont prior HEP. Exercises were reviewed and Dayana was able to demonstrate them prior to the end of the session.  Dayana demonstrated good  understanding of the education provided. See EMR under Patient Instructions for exercises provided during therapy sessions    Assessment     Patient presents with pain, mild swelling, genu varum posture, decreased L > R knee ROM, joint hypomobility, quad flexibility deficits, and significant quad > hip NM activation and strength deficits. Impairments contributory to pain affecting ADLs jasmyn weightbearing activities.     Progressing well overall with respect to quad NM activation and loading. Will cont to require consistency with strengthening outside of clinic to maximize function.    Cont to progress as able jasmyn with focus on quad strength / loading.    Dayana Is progressing well towards her goals.   Pt prognosis is Fair.     Pt will continue to benefit from skilled outpatient physical therapy to address the deficits listed in the problem  list box on initial evaluation, provide pt/family education and to maximize pt's level of independence in the home and community environment.     Pt's spiritual, cultural and educational needs considered and pt agreeable to plan of care and goals.     Anticipated barriers to physical therapy: chronicity / severity     GOALS: Short Term Goals:  0-4 weeks  1.Report decreased L > R knee pain  < / =  4/10  to increase tolerance for ADLs  2. Increase knee ROM to  deg in order to be able to perform ADLs without difficulty.  3. Increase strength by 1/3 MMT grade in quad / hip abd  to increase tolerance for ADL and work activities.  4. Pt to tolerate HEP to improve ROM and independence with ADL's     Long Term Goals: 4-8 weeks  1.Report decreased L > R knee pain < / = 2/10  to increase tolerance for ADLs  2.Patient goal: ADLs w/o complaint  3.Increase strength to >/= 4+/5 in quad / hip abd  to increase tolerance for ADL and work activities.  4. Pt will report at CJ level (20-40% impaired) on FOTO knee to demonstrate increase in LE function with every day tasks.     Plan     Plan of care Certification: 12/19/2023 to 2/19/2024.     Outpatient Physical Therapy 2 times weekly for 8 weeks to include the following interventions: Manual Therapy, Moist Heat/ Ice, Neuromuscular Re-ed, Patient Education, Self Care, Therapeutic Activities, and Therapeutic Exercise.     INOCENCIO ORO, PT, DPT, OCS

## 2024-01-30 ENCOUNTER — CLINICAL SUPPORT (OUTPATIENT)
Dept: REHABILITATION | Facility: HOSPITAL | Age: 69
End: 2024-01-30
Payer: MEDICARE

## 2024-01-30 DIAGNOSIS — M25.562 CHRONIC PAIN OF BOTH KNEES: Primary | ICD-10-CM

## 2024-01-30 DIAGNOSIS — G89.29 CHRONIC PAIN OF BOTH KNEES: Primary | ICD-10-CM

## 2024-01-30 DIAGNOSIS — M25.561 CHRONIC PAIN OF BOTH KNEES: Primary | ICD-10-CM

## 2024-01-30 DIAGNOSIS — R29.898 WEAKNESS OF BOTH LOWER EXTREMITIES: ICD-10-CM

## 2024-01-30 DIAGNOSIS — Z74.09 IMPAIRED FUNCTIONAL MOBILITY, BALANCE, GAIT, AND ENDURANCE: ICD-10-CM

## 2024-01-30 PROCEDURE — 97530 THERAPEUTIC ACTIVITIES: CPT

## 2024-01-30 PROCEDURE — 97112 NEUROMUSCULAR REEDUCATION: CPT

## 2024-01-30 NOTE — PLAN OF CARE
[facility]   Physical Therapy Treatment Note      Name: Dayana Pina Washington  Clinic Number: [id number]    Therapy Diagnosis:   Encounter Diagnoses   Name Primary?    Chronic pain of both knees Yes    Weakness of both lower extremities     Impaired functional mobility, balance, gait, and endurance      Physician: Jd West DO    Visit Date: 1/30/2024    Physician Orders: PT Eval and Treat   Medical Diagnosis from Referral: M17.12 (ICD-10-CM) - Arthritis of left knee  Evaluation Date: 12/19/2023  Authorization Period Expiration: 12/5/2024  Plan of Care Expiration: 2/28/2024  Progress Note Due: 2/28/2024  Visit # / Visits authorized: 1/ 1; Visit 6; 4/20 per referral    FOTO: 1/3     Precautions: Standard      Time In: 1506  Time Out: 1600  Total Appointment Time (timed & untimed codes): 54 minutes - 54 all one on one    Subjective     Pt reports: Adductors were sore after last visit. Otherwise feels cont gradual improvement over time.    FOTO IE - 48%  FOTO 1/12/24 - 54%  FOTO Goal - 67%    She was compliant with home exercise program.  Response to previous treatment: HEP  Functional change: ongoing    Pain: 4/10  Location: L > R knee pain    Objective      Objective Measures updated at progress report unless specified.     Lower Extremity Strength:  Right LE   Left LE     Quadriceps: 4/5 Quadriceps: 4-/5 * pain   Hamstrings: 4/5 Hamstrings: 4-/5   Hip ABD: 3/5 Hip ABD: 3-/5     Prone knee bend (RF): R = +++; L = +++++     Treatment     Dayana received the treatments listed below:      therapeutic exercises to develop strength, endurance, ROM, flexibility, posture, and core stabilization for 00 minutes including:    Patient education:  - HEP     Prone quad 3x30 sec ea gladys at beginning and end of visit     NEXT VISIT - cont quad stretching and loading       neuromuscular re-education activities to improve: Balance, Coordination, Kinesthetic, Sense, Proprioception, and Posture 30  minutes. The following activities were included:     Patient education:  - importance of quad and hip musculature in presentation  - increasing reps to 3x12 at minimum for all interventions     Knee extension matrix DL 4x12 19lbs  Knee flexion matrix DL 4x12 19lbs    Bridge w/ GTB 5x10  Bridge adduction 4lbs 3x10 - NT  Clam w/ GTB 3x10 ea gladys      Held:  TKE red band L knee x3 min with 3 sec holds  SLR w/ DF 12x5 sec ea gladys (previously 3x10x5 sec)  SAQ 3# 3x10 ea gladys       therapeutic activities to improve functional performance for 24 minutes, including:     Recumbent bike x10 min Lvl 5.0 for joint mobility, CV, and LE endurance  Shuttle SL press 2B 3x10-15 ea gladys  Shuttle DL press 3B1R 3x10-15 total  GTB lateral walks at table x5 laps       Patient Education and Home Exercises       Education provided:   - see above    Written Home Exercises Provided: Patient instructed to cont prior HEP. Exercises were reviewed and Dayana was able to demonstrate them prior to the end of the session.  Dayana demonstrated good  understanding of the education provided. See EMR under Patient Instructions for exercises provided during therapy sessions    Assessment     Patient presents with pain, mild swelling, genu varum posture, decreased L > R knee ROM, joint hypomobility, quad flexibility deficits, and significant quad > hip NM activation and strength deficits. Impairments contributory to pain affecting ADLs jasmyn weightbearing activities.     Cont to be progressing well overall with respect to quad NM activation and loading and is donald increased challenge to near all interventions within visits. Will cont to require consistency with strengthening outside of clinic to maximize function.    Cont to progress as able jasmyn with focus on quad strength / loading.    Dayana Is progressing well towards her goals.   Pt prognosis is Fair.     Pt will continue to benefit from skilled outpatient physical therapy to address the deficits listed  in the problem list box on initial evaluation, provide pt/family education and to maximize pt's level of independence in the home and community environment.     Pt's spiritual, cultural and educational needs considered and pt agreeable to plan of care and goals.     Anticipated barriers to physical therapy: chronicity / severity     GOALS: Short Term Goals:  0-4 weeks  1.Report decreased L > R knee pain  < / =  4/10  to increase tolerance for ADLs  2. Increase knee ROM to  deg in order to be able to perform ADLs without difficulty.  3. Increase strength by 1/3 MMT grade in quad / hip abd  to increase tolerance for ADL and work activities.  4. Pt to tolerate HEP to improve ROM and independence with ADL's     Long Term Goals: 4-8 weeks  1.Report decreased L > R knee pain < / = 2/10  to increase tolerance for ADLs  2.Patient goal: ADLs w/o complaint  3.Increase strength to >/= 4+/5 in quad / hip abd  to increase tolerance for ADL and work activities.  4. Pt will report at CJ level (20-40% impaired) on FOTO knee to demonstrate increase in LE function with every day tasks.     Plan     Plan of care Certification: 12/19/2023 to 2/28/2024.     Outpatient Physical Therapy 2 times weekly for 8 weeks to include the following interventions: Manual Therapy, Moist Heat/ Ice, Neuromuscular Re-ed, Patient Education, Self Care, Therapeutic Activities, and Therapeutic Exercise.     INOCENICO ORO, PT, DPT, OCS

## 2024-01-30 NOTE — PROGRESS NOTES
[facility]   Physical Therapy Treatment Note      Name: Dayana Pina Washington  Clinic Number: [id number]    Therapy Diagnosis:   Encounter Diagnoses   Name Primary?    Chronic pain of both knees Yes    Weakness of both lower extremities     Impaired functional mobility, balance, gait, and endurance      Physician: Jd West DO    Visit Date: 1/30/2024    Physician Orders: PT Eval and Treat   Medical Diagnosis from Referral: M17.12 (ICD-10-CM) - Arthritis of left knee  Evaluation Date: 12/19/2023  Authorization Period Expiration: 12/5/2024  Plan of Care Expiration: 2/28/2024  Progress Note Due: 2/28/2024  Visit # / Visits authorized: 1/ 1; Visit 6; 4/20 per referral    FOTO: 1/3     Precautions: Standard      Time In: 1506  Time Out: 1600  Total Appointment Time (timed & untimed codes): 54 minutes - 54 all one on one    Subjective     Pt reports: Adductors were sore after last visit. Otherwise feels cont gradual improvement over time.    FOTO IE - 48%  FOTO 1/12/24 - 54%  FOTO Goal - 67%    She was compliant with home exercise program.  Response to previous treatment: HEP  Functional change: ongoing    Pain: 4/10  Location: L > R knee pain    Objective      Objective Measures updated at progress report unless specified.     Lower Extremity Strength:  Right LE   Left LE     Quadriceps: 4/5 Quadriceps: 4-/5 * pain   Hamstrings: 4/5 Hamstrings: 4-/5   Hip ABD: 3/5 Hip ABD: 3-/5     Prone knee bend (RF): R = +++; L = +++++     Treatment     Dayana received the treatments listed below:      therapeutic exercises to develop strength, endurance, ROM, flexibility, posture, and core stabilization for 00 minutes including:    Patient education:  - HEP     Prone quad 3x30 sec ea gladys at beginning and end of visit     NEXT VISIT - cont quad stretching and loading       neuromuscular re-education activities to improve: Balance, Coordination, Kinesthetic, Sense, Proprioception, and Posture 30  minutes. The following activities were included:     Patient education:  - importance of quad and hip musculature in presentation  - increasing reps to 3x12 at minimum for all interventions     Knee extension matrix DL 4x12 19lbs  Knee flexion matrix DL 4x12 19lbs    Bridge w/ GTB 5x10  Bridge adduction 4lbs 3x10 - NT  Clam w/ GTB 3x10 ea gladys      Held:  TKE red band L knee x3 min with 3 sec holds  SLR w/ DF 12x5 sec ea gladys (previously 3x10x5 sec)  SAQ 3# 3x10 ea gladys       therapeutic activities to improve functional performance for 24 minutes, including:     Recumbent bike x10 min Lvl 5.0 for joint mobility, CV, and LE endurance  Shuttle SL press 2B 3x10-15 ea gladys  Shuttle DL press 3B1R 3x10-15 total  GTB lateral walks at table x5 laps       Patient Education and Home Exercises       Education provided:   - see above    Written Home Exercises Provided: Patient instructed to cont prior HEP. Exercises were reviewed and Dayana was able to demonstrate them prior to the end of the session.  Dayana demonstrated good  understanding of the education provided. See EMR under Patient Instructions for exercises provided during therapy sessions    Assessment     Patient presents with pain, mild swelling, genu varum posture, decreased L > R knee ROM, joint hypomobility, quad flexibility deficits, and significant quad > hip NM activation and strength deficits. Impairments contributory to pain affecting ADLs jasmyn weightbearing activities.     Cont to be progressing well overall with respect to quad NM activation and loading and is doanld increased challenge to near all interventions within visits. Will cont to require consistency with strengthening outside of clinic to maximize function.    Cont to progress as able jasmyn with focus on quad strength / loading.    Dayana Is progressing well towards her goals.   Pt prognosis is Fair.     Pt will continue to benefit from skilled outpatient physical therapy to address the deficits listed  in the problem list box on initial evaluation, provide pt/family education and to maximize pt's level of independence in the home and community environment.     Pt's spiritual, cultural and educational needs considered and pt agreeable to plan of care and goals.     Anticipated barriers to physical therapy: chronicity / severity     GOALS: Short Term Goals:  0-4 weeks  1.Report decreased L > R knee pain  < / =  4/10  to increase tolerance for ADLs  2. Increase knee ROM to  deg in order to be able to perform ADLs without difficulty.  3. Increase strength by 1/3 MMT grade in quad / hip abd  to increase tolerance for ADL and work activities.  4. Pt to tolerate HEP to improve ROM and independence with ADL's     Long Term Goals: 4-8 weeks  1.Report decreased L > R knee pain < / = 2/10  to increase tolerance for ADLs  2.Patient goal: ADLs w/o complaint  3.Increase strength to >/= 4+/5 in quad / hip abd  to increase tolerance for ADL and work activities.  4. Pt will report at CJ level (20-40% impaired) on FOTO knee to demonstrate increase in LE function with every day tasks.     Plan     Plan of care Certification: 12/19/2023 to 2/28/2024.     Outpatient Physical Therapy 2 times weekly for 8 weeks to include the following interventions: Manual Therapy, Moist Heat/ Ice, Neuromuscular Re-ed, Patient Education, Self Care, Therapeutic Activities, and Therapeutic Exercise.     INOCENCIO ORO, PT, DPT, OCS

## 2024-02-05 ENCOUNTER — RESEARCH ENCOUNTER (OUTPATIENT)
Dept: RESEARCH | Facility: HOSPITAL | Age: 69
End: 2024-02-05
Payer: MEDICARE

## 2024-02-05 NOTE — PROGRESS NOTES
[facility]issa [facility] 2022.003    Melissa ID: [id number]     Results the [facility] Multi Cancer Early Detection test and were reviewed by PI Dr Champion. Patient was called and notified of test results, there was no signal detected.    Patient reminded, this was a screening test, so we still highly encourage them to continue other normal health screenings  and to continue to adhere to guideline-recommended cancer screening.    Patient was asked about completing 30 day questionnaire online and was agreeable.

## 2024-02-06 ENCOUNTER — CLINICAL SUPPORT (OUTPATIENT)
Dept: REHABILITATION | Facility: HOSPITAL | Age: 69
End: 2024-02-06
Payer: MEDICARE

## 2024-02-06 DIAGNOSIS — M25.561 CHRONIC PAIN OF BOTH KNEES: Primary | ICD-10-CM

## 2024-02-06 DIAGNOSIS — R29.898 WEAKNESS OF BOTH LOWER EXTREMITIES: ICD-10-CM

## 2024-02-06 DIAGNOSIS — G89.29 CHRONIC PAIN OF BOTH KNEES: Primary | ICD-10-CM

## 2024-02-06 DIAGNOSIS — Z74.09 IMPAIRED FUNCTIONAL MOBILITY, BALANCE, GAIT, AND ENDURANCE: ICD-10-CM

## 2024-02-06 DIAGNOSIS — M25.562 CHRONIC PAIN OF BOTH KNEES: Primary | ICD-10-CM

## 2024-02-06 PROCEDURE — 97112 NEUROMUSCULAR REEDUCATION: CPT

## 2024-02-06 PROCEDURE — 97530 THERAPEUTIC ACTIVITIES: CPT

## 2024-02-06 NOTE — PROGRESS NOTES
[facility]   Physical Therapy Treatment Note      Name: Dayana Pina Washington  Clinic Number: [id number]    Therapy Diagnosis:   Encounter Diagnoses   Name Primary?    Chronic pain of both knees Yes    Weakness of both lower extremities     Impaired functional mobility, balance, gait, and endurance        Physician: Jd West DO    Visit Date: 2/6/2024    Physician Orders: PT Eval and Treat   Medical Diagnosis from Referral: M17.12 (ICD-10-CM) - Arthritis of left knee  Evaluation Date: 12/19/2023  Authorization Period Expiration: 12/5/2024  Plan of Care Expiration: 2/28/2024  Progress Note Due: 2/28/2024  Visit # / Visits authorized: 1/ 1; Visit 7; 5/20 per referral    FOTO: 1/3     Precautions: Standard      Time In: 1502  Time Out: 1600  Total Appointment Time (timed & untimed codes): 58 minutes - 38 one on one    Subjective     Pt reports: No new complaints. Starts off good walking long distances e.g. mall, but then fatigues and it gets a little worse.    FOTO IE - 48%  FOTO 1/12/24 - 54%  FOTO 2/6/24 - 79%  FOTO Goal - 67%    She was compliant with home exercise program.  Response to previous treatment: HEP  Functional change: ongoing    Pain: 4/10  Location: L > R knee pain    Objective      Objective Measures updated at progress report unless specified.     Lower Extremity Strength:  Right LE   Left LE     Quadriceps: 4/5 Quadriceps: 4-/5 * pain   Hamstrings: 4/5 Hamstrings: 4-/5   Hip ABD: 3/5 Hip ABD: 3-/5     Prone knee bend (RF): R = +++; L = +++++     Treatment     Dayana received the treatments listed below:      therapeutic exercises to develop strength, endurance, ROM, flexibility, posture, and core stabilization for 00 minutes including:    Patient education:  - HEP     Prone quad 3x30 sec ea gladys at beginning and end of visit     NEXT VISIT - cont quad stretching and loading       neuromuscular re-education activities to improve: Balance, Coordination, Kinesthetic,  Sense, Proprioception, and Posture 30 minutes - 10 min one on one. The following activities were included:     Patient education:  - importance of quad and hip musculature in presentation  - increasing reps to 3x12 at minimum for all interventions     Bridge w/ GTB 5x10x2 sec  Clam w/ GTB 4x10 ea gladys    Knee extension matrix DL 4x10-12 19lbs  Knee flexion matrix DL 4x10-12 19lbs    Held:  Bridge adduction 4lbs 3x10 - NT  TKE red band L knee x3 min with 3 sec holds  SLR w/ DF 12x5 sec ea gladys (previously 3x10x5 sec)  SAQ 3# 3x10 ea gladys       therapeutic activities to improve functional performance for 28 minutes - 28 min one on one, including:     Recumbent bike x13 min Lvl 5.0 for joint mobility, CV, and LE endurance    SL Leg press 60lbs 2x10 ea gladys  DL Leg press 80lbs 2x10  GTB lateral walks at table x10 laps    Held:  Shuttle SL press 2B 3x10-15 ea gladys  Shuttle DL press 3B1R 3x10-15 total       Patient Education and Home Exercises       Education provided:   - see above    Written Home Exercises Provided: Patient instructed to cont prior HEP. Exercises were reviewed and Dayana was able to demonstrate them prior to the end of the session.  Dayana demonstrated good  understanding of the education provided. See EMR under Patient Instructions for exercises provided during therapy sessions    Assessment     Patient presents with pain, mild swelling, genu varum posture, decreased L > R knee ROM, joint hypomobility, quad flexibility deficits, and significant quad > hip NM activation and strength deficits. Impairments contributory to pain affecting ADLs jasmyn weightbearing activities.     Cont improvement in function per FOTO scores. Will cont to require consistency with strengthening outside of clinic to maximize function.    Cont to progress as able jasmyn with focus on quad strength / loading.    Dayana Is progressing well towards her goals.   Pt prognosis is Fair.     Pt will continue to benefit from skilled outpatient  physical therapy to address the deficits listed in the problem list box on initial evaluation, provide pt/family education and to maximize pt's level of independence in the home and community environment.     Pt's spiritual, cultural and educational needs considered and pt agreeable to plan of care and goals.     Anticipated barriers to physical therapy: chronicity / severity     GOALS: Short Term Goals:  0-4 weeks  1.Report decreased L > R knee pain  < / =  4/10  to increase tolerance for ADLs  2. Increase knee ROM to  deg in order to be able to perform ADLs without difficulty.  3. Increase strength by 1/3 MMT grade in quad / hip abd  to increase tolerance for ADL and work activities.  4. Pt to tolerate HEP to improve ROM and independence with ADL's     Long Term Goals: 4-8 weeks  1.Report decreased L > R knee pain < / = 2/10  to increase tolerance for ADLs  2.Patient goal: ADLs w/o complaint  3.Increase strength to >/= 4+/5 in quad / hip abd  to increase tolerance for ADL and work activities.  4. Pt will report at CJ level (20-40% impaired) on FOTO knee to demonstrate increase in LE function with every day tasks.     Plan     Plan of care Certification: 12/19/2023 to 2/28/2024.     Outpatient Physical Therapy 2 times weekly for 8 weeks to include the following interventions: Manual Therapy, Moist Heat/ Ice, Neuromuscular Re-ed, Patient Education, Self Care, Therapeutic Activities, and Therapeutic Exercise.     INOCENCIO ORO, PT, DPT, OCS

## 2024-02-25 ENCOUNTER — TELEPHONE (OUTPATIENT)
Dept: INTERNAL MEDICINE | Facility: CLINIC | Age: 69
End: 2024-02-25

## 2024-02-27 ENCOUNTER — CLINICAL SUPPORT (OUTPATIENT)
Dept: REHABILITATION | Facility: HOSPITAL | Age: 69
End: 2024-02-27
Payer: MEDICARE

## 2024-02-27 DIAGNOSIS — M25.562 CHRONIC PAIN OF BOTH KNEES: Primary | ICD-10-CM

## 2024-02-27 DIAGNOSIS — R29.898 WEAKNESS OF BOTH LOWER EXTREMITIES: ICD-10-CM

## 2024-02-27 DIAGNOSIS — G89.29 CHRONIC PAIN OF BOTH KNEES: Primary | ICD-10-CM

## 2024-02-27 DIAGNOSIS — Z74.09 IMPAIRED FUNCTIONAL MOBILITY, BALANCE, GAIT, AND ENDURANCE: ICD-10-CM

## 2024-02-27 DIAGNOSIS — M25.561 CHRONIC PAIN OF BOTH KNEES: Primary | ICD-10-CM

## 2024-02-27 PROCEDURE — 97112 NEUROMUSCULAR REEDUCATION: CPT

## 2024-02-27 PROCEDURE — 97530 THERAPEUTIC ACTIVITIES: CPT

## 2024-02-27 NOTE — PROGRESS NOTES
[facility]   Physical Therapy Treatment Note      Name: Dayana Pina Washington  Clinic Number: [id number]    Therapy Diagnosis:   Encounter Diagnoses   Name Primary?    Chronic pain of both knees Yes    Weakness of both lower extremities     Impaired functional mobility, balance, gait, and endurance      Physician: Jd West DO    Visit Date: 2/27/2024    Physician Orders: PT Eval and Treat   Medical Diagnosis from Referral: M17.12 (ICD-10-CM) - Arthritis of left knee  Evaluation Date: 12/19/2023  Authorization Period Expiration: 12/5/2024  Plan of Care Expiration: 2/28/2024  Progress Note Due: 2/28/2024  Visit # / Visits authorized: 1/ 1; Visit 8; 6/20 per referral    FOTO: 1/3     Precautions: Standard      Time In: 1505  Time Out: 1600  Total Appointment Time (timed & untimed codes): 55 minutes - 55 one on one    Subjective     Pt reports: Doing great. Has a stationary bike at home and has been consistent with gym activities. Feels like knee is doing well. Feels ready for DC.    FOTO IE - 48%  FOTO 1/12/24 - 54%  FOTO 2/6/24 - 79%  FOTO Goal - 67%    She was compliant with home exercise program.  Response to previous treatment: HEP  Functional change: ongoing    Pain: 4/10  Location: L > R knee pain    Objective      Objective Measures updated at progress report unless specified.     Lower Extremity Strength IE -> 2/27/24:  Right LE   Left LE     Quadriceps: 4/5 -> 5/5 Quadriceps: 4-/5 * pain -> 5/5   Hamstrings: 4/5 -> 4+/5 Hamstrings: 4-/5 -> 4+/5   Hip ABD: 3/5 -> 4-/5 Hip ABD: 3-/5 -> 4-/5     Prone knee bend (RF): R = +++; L = +++++     Treatment     Dayana received the treatments listed below:       neuromuscular re-education activities to improve: Balance, Coordination, Kinesthetic, Sense, Proprioception, and Posture 30 minutes - 30 min one on one. The following activities were included:     Patient education:  - importance of quad and hip musculature isolation / cuing for  form     Clam w/ GTB 3x10 ea gladys  Bridge w/ GTB 3x10x5 sec    Knee extension matrix DL 3x10 25lbs  Knee extension matrix SL 3x10 15lbs ea gladys  Knee flexion matrix DL 3x10 25lbs      Held:  Bridge adduction 4lbs 3x10 - NT  TKE red band L knee x3 min with 3 sec holds  SLR w/ DF 12x5 sec ea gladys (previously 3x10x5 sec)  SAQ 3# 3x10 ea gladys       therapeutic activities to improve functional performance for 25 minutes - 25 min one on one, including:    Patient education:  - cont consistency with HEP    Assessments    QUAD VS. HIP DOM LOADING:   SL Leg press 60lbs 3x10 ea gladys  DL Leg press 100lbs 3x10    REVIEW:  Recumbent bike x13 min Lvl 5.0 for joint mobility, CV, and LE endurance  GTB lateral walks at table x10 laps  Shuttle SL press 2B 3x10-15 ea gladys  Shuttle DL press 3B1R 3x10-15 total       Patient Education and Home Exercises       Education provided:   - see above    Written Home Exercises Provided: Patient instructed to cont prior HEP. Exercises were reviewed and Dayana was able to demonstrate them prior to the end of the session.  Dayana demonstrated good  understanding of the education provided. See EMR under Patient Instructions for exercises provided during therapy sessions    Assessment     Patient has met functional goals per FOTO scores, she demonstrates good improvement in quad and hip NM acitvation / strength, along with good understanding and consistency of HEP.     Secondary to progress patient is DC with HEP going forward.    Dayana Is progressing well towards her goals.   Pt prognosis is Fair.     Pt will continue to benefit from skilled outpatient physical therapy to address the deficits listed in the problem list box on initial evaluation, provide pt/family education and to maximize pt's level of independence in the home and community environment.     Pt's spiritual, cultural and educational needs considered and pt agreeable to plan of care and goals.     Anticipated barriers to physical therapy:  chronicity / severity     GOALS: Short Term Goals:  0-4 weeks  1.Report decreased L > R knee pain  < / =  4/10  to increase tolerance for ADLs - MET  2. Increase knee ROM to  deg in order to be able to perform ADLs without difficulty. - MET  3. Increase strength by 1/3 MMT grade in quad / hip abd  to increase tolerance for ADL and work activities. - MET  4. Pt to tolerate HEP to improve ROM and independence with ADL's - MET     Long Term Goals: 4-8 weeks  1.Report decreased L > R knee pain < / = 2/10  to increase tolerance for ADLs - MET  2.Patient goal: ADLs w/o complaint - MET  3.Increase strength to >/= 4+/5 in quad / hip abd  to increase tolerance for ADL and work activities. - MET / IN PROGRESS  4. Pt will report at CJ level (20-40% impaired) on FOTO knee to demonstrate increase in LE function with every day tasks.  - MET    Plan     Plan of care Certification: 12/19/2023 to 2/28/2024.     DC with HEP    INOCENCIO ORO, PT, DPT, OCS         NONE

## 2024-02-28 ENCOUNTER — TELEPHONE (OUTPATIENT)
Dept: INTERNAL MEDICINE | Facility: CLINIC | Age: 69
End: 2024-02-28
Payer: MEDICARE

## 2024-02-28 NOTE — TELEPHONE ENCOUNTER
----- Message from Joselyn Pickett sent at 2/28/2024 11:49 AM CST -----  Contact: self  [phone]  Per pt need the lab orders to go to [facility].    Please call and advise

## 2024-03-04 ENCOUNTER — TELEPHONE (OUTPATIENT)
Dept: INTERNAL MEDICINE | Facility: CLINIC | Age: 69
End: 2024-03-04
Payer: MEDICARE

## 2024-03-04 NOTE — TELEPHONE ENCOUNTER
----- Message from Susan Murrell sent at 3/4/2024  2:12 PM CST -----  Pt said her lab has to be done through [facility]. Please send lab order to [facility].      Thanks  Susan SAGASTUME

## 2024-03-25 DIAGNOSIS — J01.00 ACUTE NON-RECURRENT MAXILLARY SINUSITIS: ICD-10-CM

## 2024-03-25 NOTE — TELEPHONE ENCOUNTER
Care Due:                  Date            Visit Type   Department     Provider  --------------------------------------------------------------------------------                                MYCHART                              FOLLOWUP/OF  [facility] PRIMARY  Last Visit: 03-      FICE VISIT   CARE           Landy Marie                               -                              PRIMARY      [facility] INTERNAL  Next Visit: 04-      CARE ([facility])   MEDICINE       Landy Marie                                                            Last  Test          Frequency    Reason                     Performed    Due Date  --------------------------------------------------------------------------------    CMP.........  12 months..  atorvastatin, ezetimibe..  03- 03-    Lipid Panel.  12 months..  atorvastatin, ezetimibe..  Not Found    Overdue    [employer] Embedded Care Due Messages. Reference number: [id number].   3/25/2024 12:39:56 AM CDT

## 2024-03-26 RX ORDER — ALBUTEROL SULFATE 90 UG/1
2 AEROSOL, METERED RESPIRATORY (INHALATION) EVERY 4 HOURS PRN
Qty: 54 G | Refills: 0 | Status: SHIPPED | OUTPATIENT
Start: 2024-03-26

## 2024-03-26 NOTE — TELEPHONE ENCOUNTER
Provider Staff:  Action required for this patient    Requires labs      Please see care gap opportunities below in Care Due Message.    Thanks!  [facility]     Appointments      Date Provider   Last Visit   3/3/2023 Landy Marie MD   Next Visit   4/18/2024 Landy Marie MD     Refill Decision Note   Dayana Su  is requesting a refill authorization.    Brief Assessment and Rationale for Refill:  Approve       Medication Therapy Plan:  Reviewed acute care/admission visit notes; No follow up with PCP recommended by acute care provider; Approved per protocol      Extended chart review required: Yes   Comments:     Note composed:8:57 AM 03/26/2024

## 2024-04-01 ENCOUNTER — TELEPHONE (OUTPATIENT)
Dept: INTERNAL MEDICINE | Facility: CLINIC | Age: 69
End: 2024-04-01
Payer: MEDICARE

## 2024-04-01 DIAGNOSIS — E78.5 HYPERLIPIDEMIA, UNSPECIFIED HYPERLIPIDEMIA TYPE: ICD-10-CM

## 2024-04-01 DIAGNOSIS — R94.6 ABNORMAL RESULTS OF THYROID FUNCTION STUDIES: ICD-10-CM

## 2024-04-01 DIAGNOSIS — I10 ESSENTIAL HYPERTENSION: Primary | ICD-10-CM

## 2024-04-01 DIAGNOSIS — R73.9 HYPERGLYCEMIA: ICD-10-CM

## 2024-04-01 DIAGNOSIS — E55.9 MILD VITAMIN D DEFICIENCY: ICD-10-CM

## 2024-04-01 NOTE — TELEPHONE ENCOUNTER
----- Message from Valeria Espinosa sent at 4/1/2024 10:37 AM CDT -----  Contact: [phone] patient  [email or web address]     Patient is calling for Medical Advice regarding:Patient need a call to see if her orders have been sent to [facility]    How long has patient had these symptoms:    Pharmacy name and phone#:    Would like response via [facility]:  call     Comments:

## 2024-04-02 NOTE — TELEPHONE ENCOUNTER
Patient is requesting her annual labs be ordered and sent to [facility]. Her appointment is 4/18/2024

## 2024-04-02 NOTE — TELEPHONE ENCOUNTER
----- Message from Alicia Tapia sent at 4/2/2024  3:57 PM CDT -----  Contact: [phone]@Patient  Good afternoon patient would like to request lab orders for her upcoming apt on 4/18 to be sent to [facility]. Please give patient a call back [phone]

## 2024-04-18 ENCOUNTER — OFFICE VISIT (OUTPATIENT)
Dept: INTERNAL MEDICINE | Facility: CLINIC | Age: 69
End: 2024-04-18
Payer: MEDICARE

## 2024-04-18 ENCOUNTER — LAB VISIT (OUTPATIENT)
Dept: LAB | Facility: HOSPITAL | Age: 69
End: 2024-04-18
Attending: INTERNAL MEDICINE
Payer: MEDICARE

## 2024-04-18 VITALS
OXYGEN SATURATION: 98 % | SYSTOLIC BLOOD PRESSURE: 124 MMHG | HEIGHT: 62 IN | RESPIRATION RATE: 19 BRPM | DIASTOLIC BLOOD PRESSURE: 67 MMHG | WEIGHT: 129.19 LBS | HEART RATE: 70 BPM | BODY MASS INDEX: 23.77 KG/M2

## 2024-04-18 DIAGNOSIS — R05.9 COUGH, UNSPECIFIED TYPE: ICD-10-CM

## 2024-04-18 DIAGNOSIS — I10 ESSENTIAL HYPERTENSION: ICD-10-CM

## 2024-04-18 DIAGNOSIS — J45.909 EXTRINSIC ASTHMA, UNSPECIFIED ASTHMA SEVERITY, UNSPECIFIED WHETHER COMPLICATED, UNSPECIFIED WHETHER PERSISTENT: ICD-10-CM

## 2024-04-18 DIAGNOSIS — I10 ESSENTIAL HYPERTENSION: Primary | ICD-10-CM

## 2024-04-18 DIAGNOSIS — I25.10 CVD (CARDIOVASCULAR DISEASE): ICD-10-CM

## 2024-04-18 DIAGNOSIS — Z12.31 SCREENING MAMMOGRAM, ENCOUNTER FOR: ICD-10-CM

## 2024-04-18 DIAGNOSIS — Z12.11 SCREENING FOR COLON CANCER: ICD-10-CM

## 2024-04-18 LAB
ALBUMIN/CREAT UR: NORMAL UG/MG (ref 0–30)
BILIRUB UR QL STRIP: NEGATIVE
CLARITY UR REFRACT.AUTO: CLEAR
COLOR UR AUTO: COLORLESS
CREAT UR-MCNC: 15 MG/DL (ref 15–325)
GLUCOSE UR QL STRIP: NEGATIVE
HGB UR QL STRIP: NEGATIVE
KETONES UR QL STRIP: NEGATIVE
LEUKOCYTE ESTERASE UR QL STRIP: NEGATIVE
MICROALBUMIN UR DL<=1MG/L-MCNC: <5 UG/ML
NITRITE UR QL STRIP: NEGATIVE
PH UR STRIP: 7 [PH] (ref 5–8)
PROT UR QL STRIP: NEGATIVE
SP GR UR STRIP: 1 (ref 1–1.03)
URN SPEC COLLECT METH UR: ABNORMAL

## 2024-04-18 PROCEDURE — 3061F NEG MICROALBUMINURIA REV: CPT | Mod: CPTII,S$GLB,, | Performed by: INTERNAL MEDICINE

## 2024-04-18 PROCEDURE — 82043 UR ALBUMIN QUANTITATIVE: CPT | Performed by: INTERNAL MEDICINE

## 2024-04-18 PROCEDURE — 96372 THER/PROPH/DIAG INJ SC/IM: CPT | Mod: 59,S$GLB,, | Performed by: INTERNAL MEDICINE

## 2024-04-18 PROCEDURE — 99214 OFFICE O/P EST MOD 30 MIN: CPT | Mod: 25,S$GLB,, | Performed by: INTERNAL MEDICINE

## 2024-04-18 PROCEDURE — 3066F NEPHROPATHY DOC TX: CPT | Mod: CPTII,S$GLB,, | Performed by: INTERNAL MEDICINE

## 2024-04-18 PROCEDURE — 99999 PR PBB SHADOW E&M-EST. PATIENT-LVL V: CPT | Mod: PBBFAC,,, | Performed by: INTERNAL MEDICINE

## 2024-04-18 PROCEDURE — 3044F HG A1C LEVEL LT 7.0%: CPT | Mod: CPTII,S$GLB,, | Performed by: INTERNAL MEDICINE

## 2024-04-18 PROCEDURE — 3078F DIAST BP <80 MM HG: CPT | Mod: CPTII,S$GLB,, | Performed by: INTERNAL MEDICINE

## 2024-04-18 PROCEDURE — 94640 AIRWAY INHALATION TREATMENT: CPT | Mod: S$GLB,,, | Performed by: INTERNAL MEDICINE

## 2024-04-18 PROCEDURE — 3008F BODY MASS INDEX DOCD: CPT | Mod: CPTII,S$GLB,, | Performed by: INTERNAL MEDICINE

## 2024-04-18 PROCEDURE — 81003 URINALYSIS AUTO W/O SCOPE: CPT | Performed by: INTERNAL MEDICINE

## 2024-04-18 PROCEDURE — 3074F SYST BP LT 130 MM HG: CPT | Mod: CPTII,S$GLB,, | Performed by: INTERNAL MEDICINE

## 2024-04-18 RX ORDER — BUDESONIDE AND FORMOTEROL FUMARATE DIHYDRATE 160; 4.5 UG/1; UG/1
2 AEROSOL RESPIRATORY (INHALATION) EVERY 12 HOURS
Qty: 10.2 G | Refills: 6 | Status: SHIPPED | OUTPATIENT
Start: 2024-04-18 | End: 2025-04-18

## 2024-04-18 RX ORDER — ATORVASTATIN CALCIUM 20 MG/1
20 TABLET, FILM COATED ORAL DAILY
Qty: 90 TABLET | Refills: 3 | Status: SHIPPED | OUTPATIENT
Start: 2024-04-18

## 2024-04-18 RX ORDER — AZITHROMYCIN 250 MG/1
TABLET, FILM COATED ORAL
Qty: 6 TABLET | Refills: 0 | Status: SHIPPED | OUTPATIENT
Start: 2024-04-18

## 2024-04-18 RX ORDER — GABAPENTIN 300 MG/1
300 CAPSULE ORAL NIGHTLY
Qty: 30 CAPSULE | Refills: 2 | Status: SHIPPED | OUTPATIENT
Start: 2024-04-18 | End: 2025-04-18

## 2024-04-18 RX ORDER — PREDNISONE 20 MG/1
20 TABLET ORAL DAILY
Qty: 4 TABLET | Refills: 0 | Status: SHIPPED | OUTPATIENT
Start: 2024-04-18

## 2024-04-18 RX ORDER — TRIAMCINOLONE ACETONIDE 40 MG/ML
40 INJECTION, SUSPENSION INTRA-ARTICULAR; INTRAMUSCULAR ONCE
Status: COMPLETED | OUTPATIENT
Start: 2024-04-18 | End: 2024-04-18

## 2024-04-18 RX ORDER — PROMETHAZINE HYDROCHLORIDE AND DEXTROMETHORPHAN HYDROBROMIDE 6.25; 15 MG/5ML; MG/5ML
5 SYRUP ORAL NIGHTLY PRN
Qty: 118 ML | Refills: 0 | Status: SHIPPED | OUTPATIENT
Start: 2024-04-18 | End: 2024-04-28

## 2024-04-18 RX ORDER — IPRATROPIUM BROMIDE AND ALBUTEROL SULFATE 2.5; .5 MG/3ML; MG/3ML
3 SOLUTION RESPIRATORY (INHALATION)
Status: COMPLETED | OUTPATIENT
Start: 2024-04-18 | End: 2024-04-18

## 2024-04-18 RX ORDER — MELOXICAM 15 MG/1
15 TABLET ORAL DAILY
Qty: 30 TABLET | Refills: 0 | Status: SHIPPED | OUTPATIENT
Start: 2024-04-18

## 2024-04-18 RX ADMIN — IPRATROPIUM BROMIDE AND ALBUTEROL SULFATE 3 ML: 2.5; .5 SOLUTION RESPIRATORY (INHALATION) at 12:04

## 2024-04-18 RX ADMIN — TRIAMCINOLONE ACETONIDE 40 MG: 40 INJECTION, SUSPENSION INTRA-ARTICULAR; INTRAMUSCULAR at 12:04

## 2024-04-18 NOTE — PROGRESS NOTES
Subjective:       Patient ID: Dayana Su is a 68 y.o. female.    Chief Complaint: Annual Exam and Cough    Cough  Pertinent negatives include no chest pain (arm pain or jaw pain), headaches or shortness of breath (PND or orthopnea).   Pt doing okay but coughing and wheezing of late.  No CP.  Review of Systems   Respiratory:  Positive for cough. Negative for shortness of breath (PND or orthopnea).    Cardiovascular:  Negative for chest pain (arm pain or jaw pain).   Gastrointestinal:  Negative for abdominal pain, diarrhea, nausea and vomiting.   Genitourinary:  Negative for dysuria.   Neurological:  Negative for seizures, syncope and headaches.       Objective:      Physical Exam  Constitutional:       General: She is not in acute distress.     Appearance: She is well-developed.   HENT:      Head: Normocephalic.   Eyes:      Pupils: Pupils are equal, round, and reactive to light.   Neck:      Thyroid: No thyromegaly.      Vascular: No JVD.   Cardiovascular:      Rate and Rhythm: Normal rate and regular rhythm.      Heart sounds: Normal heart sounds. No murmur heard.     No friction rub. No gallop.   Pulmonary:      Effort: Pulmonary effort is normal.      Breath sounds: Normal breath sounds. No wheezing or rales.      Comments: Expiratory wheezes that cleared that improved after albuterol treatment.   Abdominal:      General: Bowel sounds are normal. There is no distension.      Palpations: Abdomen is soft. There is no mass.      Tenderness: There is no abdominal tenderness. There is no guarding or rebound.   Musculoskeletal:      Cervical back: Neck supple.   Lymphadenopathy:      Cervical: No cervical adenopathy.   Skin:     General: Skin is warm and dry.   Neurological:      Mental Status: She is alert and oriented to person, place, and time.      Deep Tendon Reflexes: Reflexes are normal and symmetric.   Psychiatric:         Behavior: Behavior normal.         Thought Content: Thought content normal.          Judgment: Judgment normal.         Assessment:       1. Essential hypertension    2. Screening mammogram, encounter for    3. Screening for colon cancer    4. CVD (cardiovascular disease)    5. Cough, unspecified type    6. Extrinsic asthma, unspecified asthma severity, unspecified whether complicated, unspecified whether persistent        Plan:   Essential hypertension  -     Urinalysis; Future; Expected date: 04/18/2024  -     Microalbumin/Creatinine Ratio, Urine; Future; Expected date: 04/18/2024    Screening mammogram, encounter for  -     Mammo Digital Screening Bilat w/ Alf; Future; Expected date: 10/18/2024    Screening for colon cancer  -     Fecal Immunochemical Test (iFOBT); Future; Expected date: 04/18/2024    CVD (cardiovascular disease)  -     US Carotid Bilateral; Future; Expected date: 04/18/2024    Cough, unspecified type  -     CT Chest Without Contrast; Future; Expected date: 04/18/2024    Extrinsic asthma, unspecified asthma severity, unspecified whether complicated, unspecified whether persistent  -     Complete PFT with bronchodilator; Future  -     Ambulatory referral/consult to Allergy; Future; Expected date: 04/25/2024  -     triamcinolone acetonide injection 40 mg  -     albuterol-ipratropium 2.5 mg-0.5 mg/3 mL nebulizer solution 3 mL  -     azithromycin (Z-EH) 250 MG tablet; Two today then one daily thereafter  Dispense: 6 tablet; Refill: 0  -     predniSONE (DELTASONE) 20 MG tablet; Take 1 tablet (20 mg total) by mouth once daily.  Dispense: 4 tablet; Refill: 0  -     promethazine-dextromethorphan (PROMETHAZINE-DM) 6.25-15 mg/5 mL Syrp; Take 5 mLs by mouth nightly as needed.  Dispense: 118 mL; Refill: 0      Other orders  -     atorvastatin (LIPITOR) 20 MG tablet; Take 1 tablet (20 mg total) by mouth once daily.  Dispense: 90 tablet; Refill: 3  -     budesonide-formoterol 160-4.5 mcg (SYMBICORT) 160-4.5 mcg/actuation HFAA; Inhale 2 puffs into the lungs every 12 (twelve) hours.  Controller  Dispense: 10.2 g; Refill: 6  -     gabapentin (NEURONTIN) 300 MG capsule; Take 1 capsule (300 mg total) by mouth every evening.  Dispense: 30 capsule; Refill: 2  -     meloxicam (MOBIC) 15 MG tablet; Take 1 tablet (15 mg total) by mouth once daily.  Dispense: 30 tablet; Refill: 0

## 2024-04-24 ENCOUNTER — HOSPITAL ENCOUNTER (OUTPATIENT)
Dept: PULMONOLOGY | Facility: CLINIC | Age: 69
Discharge: HOME | End: 2024-04-24
Payer: MEDICARE

## 2024-04-24 DIAGNOSIS — J45.909 EXTRINSIC ASTHMA, UNSPECIFIED ASTHMA SEVERITY, UNSPECIFIED WHETHER COMPLICATED, UNSPECIFIED WHETHER PERSISTENT: ICD-10-CM

## 2024-04-24 LAB
DLCO ADJ PRE: 13.62 ML/(MIN*MMHG) (ref 14.45–25.92)
DLCO SINGLE BREATH LLN: 14.45
DLCO SINGLE BREATH PRE REF: 67.2 %
DLCO SINGLE BREATH REF: 20.19
DLCOC SBVA LLN: 2.82
DLCOC SBVA PRE REF: 85.8 %
DLCOC SBVA REF: 4.39
DLCOC SINGLE BREATH LLN: 14.45
DLCOC SINGLE BREATH PRE REF: 67.4 %
DLCOC SINGLE BREATH REF: 20.19
DLCOCSBVAULN: 5.95
DLCOCSINGLEBREATHULN: 25.92
DLCOSINGLEBREATHULN: 25.92
DLCOVA LLN: 2.82
DLCOVA PRE REF: 85.5 %
DLCOVA PRE: 3.75 ML/(MIN*MMHG*L) (ref 2.82–5.95)
DLCOVA REF: 4.39
DLCOVAULN: 5.95
DLVAADJ PRE: 3.76 ML/(MIN*MMHG*L) (ref 2.82–5.95)
ERV LLN: -16449.36
ERV PRE REF: 101.9 %
ERV REF: 0.64
ERVULN: ABNORMAL
FEF 25 75 LLN: 0.61
FEF 25 75 PRE REF: 99.4 %
FEF 25 75 REF: 1.6
FET100 CHG: 4.1 %
FEV05 LLN: 0.76
FEV05 REF: 1.62
FEV1 CHG: 6.8 %
FEV1 FVC LLN: 66
FEV1 FVC PRE REF: 94.8 %
FEV1 FVC REF: 79
FEV1 LLN: 1.26
FEV1 PRE REF: 105.6 %
FEV1 REF: 1.8
FEV1 VOL CHG: 0.13
FRCPLETH LLN: 1.77
FRCPLETH PREREF: 80.7 %
FRCPLETH REF: 2.6
FRCPLETHULN: 3.42
FVC CHG: 5.4 %
FVC LLN: 1.62
FVC PRE REF: 110.8 %
FVC REF: 2.29
FVC VOL CHG: 0.14
IVC PRE: 2.55 L (ref 1.62–2.99)
IVC SINGLE BREATH LLN: 1.62
IVC SINGLE BREATH PRE REF: 111.4 %
IVC SINGLE BREATH REF: 2.29
IVCSINGLEBREATHULN: 2.99
LLN IC: -16448.24
PEF LLN: 2.73
PEF PRE REF: 147.4 %
PEF REF: 4.61
PHYSICIAN COMMENT: ABNORMAL
POST FEF 25 75: 1.59 L/S (ref 0.61–3.11)
POST FET 100: 7.27 SEC
POST FEV1 FVC: 75.77 % (ref 66.06–90.02)
POST FEV1: 2.02 L (ref 1.26–2.31)
POST FEV5: 1.65 L (ref 0.76–2.48)
POST FVC: 2.67 L (ref 1.62–2.99)
POST PEF: 6.76 L/S (ref 2.73–6.49)
PRE DLCO: 13.57 ML/(MIN*MMHG) (ref 14.45–25.92)
PRE ERV: 0.65 L (ref -16449.36–16450.64)
PRE FEF 25 75: 1.59 L/S (ref 0.61–3.11)
PRE FET 100: 6.98 SEC
PRE FEV05 REF: 97.4 %
PRE FEV1 FVC: 74.81 % (ref 66.06–90.02)
PRE FEV1: 1.9 L (ref 1.26–2.31)
PRE FEV5: 1.58 L (ref 0.76–2.48)
PRE FRC PL: 2.1 L (ref 1.77–3.42)
PRE FVC: 2.54 L (ref 1.62–2.99)
PRE IC: 1.95 L (ref -16448.24–16451.76)
PRE PEF: 6.8 L/S (ref 2.73–6.49)
PRE REF IC: 110.7 %
PRE RV: 1.44 L (ref 1.38–2.53)
PRE TLC: 4.05 L (ref 3.62–5.59)
RAW PRE REF: 144.9 %
RAW PRE: 4.43 CMH2O*S/L (ref 3.06–3.06)
RAW REF: 3.06
REF IC: 1.76
RV LLN: 1.38
RV PRE REF: 73.8 %
RV REF: 1.95
RVTLC LLN: 33
RVTLC PRE REF: 84 %
RVTLC PRE: 35.63 % (ref 32.83–52.01)
RVTLC REF: 42
RVTLCULN: 52
RVULN: 2.53
SGAW PRE REF: 92.8 %
SGAW PRE: 0.09 1/(CMH2O*S) (ref 0.1–0.1)
SGAW REF: 0.1
TLC LLN: 3.62
TLC PRE REF: 87.9 %
TLC REF: 4.6
TLC ULN: 5.59
ULN IC: ABNORMAL
VA PRE: 3.62 L (ref 4.45–4.45)
VA SINGLE BREATH LLN: 4.45
VA SINGLE BREATH PRE REF: 81.2 %
VA SINGLE BREATH REF: 4.45
VASINGLEBREATHULN: 4.45
VC LLN: 1.62
VC PRE REF: 113.9 %
VC PRE: 2.6 L (ref 1.62–2.99)
VC REF: 2.29
VC ULN: 2.99

## 2024-04-24 PROCEDURE — 94726 PLETHYSMOGRAPHY LUNG VOLUMES: CPT | Mod: S$GLB,,, | Performed by: INTERNAL MEDICINE

## 2024-04-24 PROCEDURE — 94729 DIFFUSING CAPACITY: CPT | Mod: S$GLB,,, | Performed by: INTERNAL MEDICINE

## 2024-04-24 PROCEDURE — 94060 EVALUATION OF WHEEZING: CPT | Mod: S$GLB,,, | Performed by: INTERNAL MEDICINE

## 2024-04-25 ENCOUNTER — HOSPITAL ENCOUNTER (OUTPATIENT)
Dept: RADIOLOGY | Facility: HOSPITAL | Age: 69
Discharge: HOME | End: 2024-04-25
Attending: INTERNAL MEDICINE
Payer: MEDICARE

## 2024-04-25 DIAGNOSIS — I25.10 CVD (CARDIOVASCULAR DISEASE): ICD-10-CM

## 2024-04-25 DIAGNOSIS — R05.9 COUGH, UNSPECIFIED TYPE: ICD-10-CM

## 2024-04-25 PROCEDURE — 71250 CT THORAX DX C-: CPT | Mod: TC

## 2024-04-25 PROCEDURE — 93880 EXTRACRANIAL BILAT STUDY: CPT | Mod: TC

## 2024-04-25 PROCEDURE — 71250 CT THORAX DX C-: CPT | Mod: 26,,, | Performed by: STUDENT IN AN ORGANIZED HEALTH CARE EDUCATION/TRAINING PROGRAM

## 2024-04-25 PROCEDURE — 93880 EXTRACRANIAL BILAT STUDY: CPT | Mod: 26,,, | Performed by: RADIOLOGY

## 2024-04-30 ENCOUNTER — LAB VISIT (OUTPATIENT)
Dept: LAB | Facility: HOSPITAL | Age: 69
End: 2024-04-30
Attending: INTERNAL MEDICINE
Payer: MEDICARE

## 2024-04-30 ENCOUNTER — OFFICE VISIT (OUTPATIENT)
Dept: ALLERGY | Facility: CLINIC | Age: 69
End: 2024-04-30
Payer: MEDICARE

## 2024-04-30 VITALS — BODY MASS INDEX: 23.77 KG/M2 | HEIGHT: 62 IN | WEIGHT: 129.19 LBS

## 2024-04-30 DIAGNOSIS — Z12.11 SCREENING FOR COLON CANCER: ICD-10-CM

## 2024-04-30 DIAGNOSIS — J45.40 MODERATE PERSISTENT EXTRINSIC ASTHMA WITHOUT COMPLICATION: ICD-10-CM

## 2024-04-30 DIAGNOSIS — J31.0 CHRONIC RHINITIS: Primary | ICD-10-CM

## 2024-04-30 PROCEDURE — 3066F NEPHROPATHY DOC TX: CPT | Mod: CPTII,S$GLB,, | Performed by: ALLERGY & IMMUNOLOGY

## 2024-04-30 PROCEDURE — 3061F NEG MICROALBUMINURIA REV: CPT | Mod: CPTII,S$GLB,, | Performed by: ALLERGY & IMMUNOLOGY

## 2024-04-30 PROCEDURE — 1160F RVW MEDS BY RX/DR IN RCRD: CPT | Mod: CPTII,S$GLB,, | Performed by: ALLERGY & IMMUNOLOGY

## 2024-04-30 PROCEDURE — 99999 PR PBB SHADOW E&M-EST. PATIENT-LVL IV: CPT | Mod: PBBFAC,,, | Performed by: ALLERGY & IMMUNOLOGY

## 2024-04-30 PROCEDURE — 3044F HG A1C LEVEL LT 7.0%: CPT | Mod: CPTII,S$GLB,, | Performed by: ALLERGY & IMMUNOLOGY

## 2024-04-30 PROCEDURE — 3008F BODY MASS INDEX DOCD: CPT | Mod: CPTII,S$GLB,, | Performed by: ALLERGY & IMMUNOLOGY

## 2024-04-30 PROCEDURE — 1126F AMNT PAIN NOTED NONE PRSNT: CPT | Mod: CPTII,S$GLB,, | Performed by: ALLERGY & IMMUNOLOGY

## 2024-04-30 PROCEDURE — 99205 OFFICE O/P NEW HI 60 MIN: CPT | Mod: S$GLB,,, | Performed by: ALLERGY & IMMUNOLOGY

## 2024-04-30 PROCEDURE — 82274 ASSAY TEST FOR BLOOD FECAL: CPT | Performed by: INTERNAL MEDICINE

## 2024-04-30 PROCEDURE — 1159F MED LIST DOCD IN RCRD: CPT | Mod: CPTII,S$GLB,, | Performed by: ALLERGY & IMMUNOLOGY

## 2024-04-30 NOTE — PROGRESS NOTES
Subjective:       Patient ID: Dayana Su is a 68 y.o. female.    Chief Complaint:  Asthma      67 yo woman presents for consult from Dr Landy Alejandro for asthma. She states probable had since 2005 but treated monet last year or so. She has kits of coughing, has thick clear mucus. Has tightness and wheeze. She has bad exacerbation last month due to being out of med's. Was given nebulizer in clinic and steroids. She is now on Symbicort 160 2 puff BID and montelukast daily. Does not feel montelukast helps. Has albuterol and uses several time per day when flares but non e in month. When flare has night awakening but not now. She does not have much rhinitis, minimal congestion and drip, more the chest congestion and thick mucus and cough. Is worse with weather change and high pollen. Night is worse. No other triggers. No eczema. No food, insect or latex allergy. She had PFT's done which were normal and no change post bronchodilator. No other medical issues. No ENT surgery      Environmental History: see history section for home environment  Review of Systems   HENT:  Positive for congestion and postnasal drip. Negative for rhinorrhea, sinus pressure and sneezing.    Eyes:  Negative for discharge, redness and itching.   Respiratory:  Positive for cough, chest tightness, shortness of breath and wheezing.    Skin:  Negative for color change and rash.        Objective:      Physical Exam  Vitals and nursing note reviewed.   Constitutional:       General: She is not in acute distress.     Appearance: Normal appearance. She is not ill-appearing.   HENT:      Nose: No rhinorrhea.      Mouth/Throat:      Pharynx: No posterior oropharyngeal erythema.   Eyes:      General:         Right eye: No discharge.         Left eye: No discharge.      Conjunctiva/sclera: Conjunctivae normal.   Pulmonary:      Effort: Pulmonary effort is normal. No respiratory distress.   Abdominal:      General: There is no distension.    Skin:     General: Skin is warm and dry.      Findings: No erythema or rash.   Neurological:      Mental Status: She is alert and oriented to person, place, and time.   Psychiatric:         Mood and Affect: Mood normal.         Behavior: Behavior normal.         Laboratory:   none performed   Assessment:       1. Chronic rhinitis    2. 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 Will watch and if has another exacerbation in next year may need to consider Dupixent or Nucala  Immunocaps to assess if any allergic triggers  Phone review    I spent a total of 60 minutes on the day of the visit.  This includes face to face time and non-face to face time preparing to see the patient (eg, review of tests), obtaining and/or reviewing separately obtained history, documenting clinical information in the electronic or other health record, independently interpreting results and communicating results to the patient/family/caregiver, or care coordinator.

## 2024-05-07 LAB — HEMOCCULT STL QL IA: NEGATIVE

## 2024-05-10 LAB
25(OH)D3+25(OH)D2 SERPL-MCNC: 49 NG/ML (ref 30–100)
ALBUMIN SERPL-MCNC: 4.3 G/DL (ref 3.6–5.1)
ALBUMIN/GLOB SERPL: 2 (CALC) (ref 1–2.5)
ALP SERPL-CCNC: 57 U/L (ref 37–153)
ALT SERPL-CCNC: 25 U/L (ref 6–29)
AST SERPL-CCNC: 24 U/L (ref 10–35)
BASOPHILS # BLD AUTO: 103 CELLS/UL (ref 0–200)
BASOPHILS NFR BLD AUTO: 1.8 %
BILIRUB SERPL-MCNC: 0.7 MG/DL (ref 0.2–1.2)
BUN SERPL-MCNC: 9 MG/DL (ref 7–25)
BUN/CREAT SERPL: NORMAL (CALC) (ref 6–22)
CALCIUM SERPL-MCNC: 9.6 MG/DL (ref 8.6–10.4)
CHLORIDE SERPL-SCNC: 107 MMOL/L (ref 98–110)
CHOLEST SERPL-MCNC: 129 MG/DL
CHOLEST/HDLC SERPL: 1.8 (CALC)
CO2 SERPL-SCNC: 31 MMOL/L (ref 20–32)
CREAT SERPL-MCNC: 0.81 MG/DL (ref 0.5–1.05)
EGFR: 79 ML/MIN/1.73M2
EOSINOPHIL # BLD AUTO: 353 CELLS/UL (ref 15–500)
EOSINOPHIL NFR BLD AUTO: 6.2 %
ERYTHROCYTE [DISTWIDTH] IN BLOOD BY AUTOMATED COUNT: 13.1 % (ref 11–15)
GLOBULIN SER CALC-MCNC: 2.2 G/DL (CALC) (ref 1.9–3.7)
GLUCOSE SERPL-MCNC: 90 MG/DL (ref 65–99)
HBA1C MFR BLD: 6.3 % OF TOTAL HGB
HCT VFR BLD AUTO: 41.9 % (ref 35–45)
HDLC SERPL-MCNC: 72 MG/DL
HGB BLD-MCNC: 13.2 G/DL (ref 11.7–15.5)
LDLC SERPL CALC-MCNC: 47 MG/DL (CALC)
LYMPHOCYTES # BLD AUTO: 2445 CELLS/UL (ref 850–3900)
LYMPHOCYTES NFR BLD AUTO: 42.9 %
MCH RBC QN AUTO: 28.9 PG (ref 27–33)
MCHC RBC AUTO-ENTMCNC: 31.5 G/DL (ref 32–36)
MCV RBC AUTO: 91.9 FL (ref 80–100)
MONOCYTES # BLD AUTO: 604 CELLS/UL (ref 200–950)
MONOCYTES NFR BLD AUTO: 10.6 %
NEUTROPHILS # BLD AUTO: 2195 CELLS/UL (ref 1500–7800)
NEUTROPHILS NFR BLD AUTO: 38.5 %
NONHDLC SERPL-MCNC: 57 MG/DL (CALC)
PLATELET # BLD AUTO: 322 THOUSAND/UL (ref 140–400)
PMV BLD REES-ECKER: 10.4 FL (ref 7.5–12.5)
POTASSIUM SERPL-SCNC: 4.3 MMOL/L (ref 3.5–5.3)
PROT SERPL-MCNC: 6.5 G/DL (ref 6.1–8.1)
RBC # BLD AUTO: 4.56 MILLION/UL (ref 3.8–5.1)
SODIUM SERPL-SCNC: 143 MMOL/L (ref 135–146)
TRIGL SERPL-MCNC: 34 MG/DL
TSH SERPL-ACNC: 1.45 MIU/L (ref 0.4–4.5)
WBC # BLD AUTO: 5.7 THOUSAND/UL (ref 3.8–10.8)

## 2024-05-13 NOTE — PROGRESS NOTES
Some emphysema changes but no mass seen just some changes from bronchitis.  Some calcium build up in your heart vessels.

## 2024-07-19 RX ORDER — TRAZODONE HYDROCHLORIDE 100 MG/1
TABLET ORAL
Qty: 90 TABLET | Refills: 3 | OUTPATIENT
Start: 2024-07-19

## 2024-07-19 NOTE — TELEPHONE ENCOUNTER
No care due was identified.  [facility] Embedded Care Due Messages. Reference number: [id number].   7/19/2024 12:20:36 AM CDT

## 2024-07-19 NOTE — TELEPHONE ENCOUNTER
Refill Decision Note  Young GALDAMEZ. Inappropriate Request       Dayana Washington  is requesting a refill authorization.  Brief Assessment and Rationale for Refill:  Quick Discontinue     Medication Therapy Plan:  traZODone (DESYREL) 100 MG tablet  was discontinued on 4/18/2024 by Landy Marie MD. R      Comments:     Note composed:6:01 AM 07/19/2024

## 2024-10-30 ENCOUNTER — HOSPITAL ENCOUNTER (OUTPATIENT)
Dept: RADIOLOGY | Facility: HOSPITAL | Age: 69
Discharge: HOME | End: 2024-10-30
Attending: INTERNAL MEDICINE
Payer: MEDICARE

## 2024-10-30 DIAGNOSIS — Z12.31 SCREENING MAMMOGRAM, ENCOUNTER FOR: ICD-10-CM

## 2024-10-30 PROCEDURE — 77067 SCR MAMMO BI INCL CAD: CPT | Mod: TC,PO

## 2024-10-30 PROCEDURE — 77063 BREAST TOMOSYNTHESIS BI: CPT | Mod: TC,PO

## 2024-11-01 ENCOUNTER — OFFICE VISIT (OUTPATIENT)
Dept: FAMILY MEDICINE | Facility: CLINIC | Age: 69
End: 2024-11-01
Payer: MEDICARE

## 2024-11-01 VITALS
TEMPERATURE: 99 F | BODY MASS INDEX: 25.56 KG/M2 | SYSTOLIC BLOOD PRESSURE: 118 MMHG | OXYGEN SATURATION: 98 % | WEIGHT: 135.38 LBS | HEIGHT: 61 IN | DIASTOLIC BLOOD PRESSURE: 64 MMHG | HEART RATE: 61 BPM

## 2024-11-01 DIAGNOSIS — I10 ESSENTIAL HYPERTENSION: ICD-10-CM

## 2024-11-01 DIAGNOSIS — Z23 NEED FOR PROPHYLACTIC VACCINATION AND INOCULATION AGAINST INFLUENZA: ICD-10-CM

## 2024-11-01 DIAGNOSIS — R73.03 PREDIABETES: ICD-10-CM

## 2024-11-01 DIAGNOSIS — G89.29 CHRONIC MIDLINE LOW BACK PAIN WITHOUT SCIATICA: ICD-10-CM

## 2024-11-01 DIAGNOSIS — Z76.89 ENCOUNTER TO ESTABLISH CARE: Primary | ICD-10-CM

## 2024-11-01 DIAGNOSIS — M54.50 CHRONIC MIDLINE LOW BACK PAIN WITHOUT SCIATICA: ICD-10-CM

## 2024-11-01 DIAGNOSIS — Z23 NEED FOR VACCINATION WITH 20-POLYVALENT PNEUMOCOCCAL CONJUGATE VACCINE: ICD-10-CM

## 2024-11-01 PROCEDURE — 99999 PR PBB SHADOW E&M-EST. PATIENT-LVL IV: CPT | Mod: PBBFAC,,, | Performed by: FAMILY MEDICINE

## 2024-11-01 RX ORDER — VIT C/E/ZN/COPPR/LUTEIN/ZEAXAN 250MG-90MG
1 CAPSULE ORAL DAILY
COMMUNITY

## 2024-11-01 RX ORDER — ETODOLAC 400 MG/1
400 TABLET, FILM COATED ORAL 2 TIMES DAILY
Qty: 28 TABLET | Refills: 0 | Status: SHIPPED | OUTPATIENT
Start: 2024-11-01 | End: 2024-11-15

## 2024-11-01 RX ORDER — CYCLOBENZAPRINE HCL 10 MG
10 TABLET ORAL NIGHTLY PRN
Qty: 10 TABLET | Refills: 0 | Status: SHIPPED | OUTPATIENT
Start: 2024-11-01 | End: 2024-11-11

## 2024-11-01 RX ORDER — TRAZODONE HYDROCHLORIDE 50 MG/1
50 TABLET ORAL
COMMUNITY

## 2024-11-01 RX ORDER — NITROGLYCERIN 0.4 MG/1
1 TABLET SUBLINGUAL EVERY 5 MIN PRN
COMMUNITY
Start: 2024-08-05

## 2024-12-14 ENCOUNTER — OFFICE VISIT (OUTPATIENT)
Dept: URGENT CARE | Facility: CLINIC | Age: 69
End: 2024-12-14
Payer: MEDICARE

## 2024-12-14 VITALS
SYSTOLIC BLOOD PRESSURE: 114 MMHG | BODY MASS INDEX: 25.56 KG/M2 | DIASTOLIC BLOOD PRESSURE: 71 MMHG | HEART RATE: 64 BPM | WEIGHT: 135.38 LBS | HEIGHT: 61 IN | TEMPERATURE: 99 F | RESPIRATION RATE: 18 BRPM | OXYGEN SATURATION: 96 %

## 2024-12-14 DIAGNOSIS — J04.0 LARYNGITIS: ICD-10-CM

## 2024-12-14 DIAGNOSIS — J06.9 VIRAL URI WITH COUGH: Primary | ICD-10-CM

## 2024-12-14 PROCEDURE — 99213 OFFICE O/P EST LOW 20 MIN: CPT | Mod: S$GLB,,, | Performed by: FAMILY MEDICINE

## 2024-12-14 RX ORDER — GUAIFENESIN 600 MG/1
1200 TABLET, EXTENDED RELEASE ORAL 2 TIMES DAILY
Qty: 40 TABLET | Refills: 0 | Status: SHIPPED | OUTPATIENT
Start: 2024-12-14 | End: 2024-12-24

## 2024-12-14 RX ORDER — FLUTICASONE PROPIONATE 50 MCG
1 SPRAY, SUSPENSION (ML) NASAL DAILY
Qty: 16 G | Refills: 0 | Status: SHIPPED | OUTPATIENT
Start: 2024-12-14

## 2024-12-14 RX ORDER — CETIRIZINE HYDROCHLORIDE 10 MG/1
10 TABLET ORAL DAILY
Qty: 30 TABLET | Refills: 0 | Status: SHIPPED | OUTPATIENT
Start: 2024-12-14 | End: 2025-01-13

## 2024-12-14 RX ORDER — BENZONATATE 200 MG/1
200 CAPSULE ORAL 3 TIMES DAILY PRN
Qty: 30 CAPSULE | Refills: 0 | Status: SHIPPED | OUTPATIENT
Start: 2024-12-14 | End: 2024-12-24

## 2024-12-14 NOTE — PROGRESS NOTES
"Subjective:      Patient ID: Dayana Su is a 68 y.o. female.    Vitals:  height is 5' 1" (1.549 m) and weight is 61.4 kg (135 lb 5.8 oz). Her oral temperature is 98.6 °F (37 °C). Her blood pressure is 114/71 and her pulse is 64. Her respiration is 18 and oxygen saturation is 96%.     Chief Complaint: Sinus Problem    Pt presents w cough, sinus drip, wheezing, yellow green sputum for about 7 days. Denies any pmh. No fever or chills. No cp or SOB. No GI related symptoms, including, N/v/D or constipation. No anosmia or ageusia. She has been able to tolerate po without problem.       Sinus Problem  This is a new problem. The current episode started in the past 7 days. The problem is unchanged. There has been no fever. Her pain is at a severity of 0/10. She is experiencing no pain. Associated symptoms include congestion, coughing and a hoarse voice. Pertinent negatives include no chills, diaphoresis, ear pain, headaches, neck pain, shortness of breath, sinus pressure, sneezing, sore throat or swollen glands. Treatments tried: Coriciden, throat losengers, wil seltzer, tylenol, albuterol. The treatment provided no relief.       Constitution: Negative for activity change, appetite change, chills and sweating.   HENT:  Positive for congestion and postnasal drip. Negative for ear pain, sinus pain, sinus pressure and sore throat.    Neck: Negative for neck pain.   Respiratory:  Positive for cough and wheezing. Negative for sputum production, bloody sputum, COPD, shortness of breath, stridor and asthma.    Allergic/Immunologic: Negative for asthma and sneezing.   Neurological:  Negative for headaches.      Objective:     Physical Exam   Constitutional: She is oriented to person, place, and time. She appears well-developed.  Non-toxic appearance. She does not appear ill. No distress.      Comments:Hoarse voice noted     HENT:   Head: Normocephalic and atraumatic.   Ears:   Right Ear: External ear normal.   Left " Ear: External ear normal.   Nose: Nose normal.   Mouth/Throat: Oropharynx is clear and moist.   Eyes: Conjunctivae, EOM and lids are normal. Pupils are equal, round, and reactive to light.   Neck: Trachea normal and phonation normal. Neck supple.   Pulmonary/Chest: Effort normal. She has wheezes (faint wheezing) in the right middle field and the left upper field.   Musculoskeletal: Normal range of motion.         General: Normal range of motion.   Neurological: She is alert and oriented to person, place, and time.   Skin: Skin is warm, dry, intact and not diaphoretic.   Psychiatric: Her speech is normal and behavior is normal. Judgment and thought content normal.   Nursing note and vitals reviewed.      Assessment:     1. Viral URI with cough    2. Laryngitis        Plan:     Vss, lungs ctab, nad, otc meds reviewed, f/u if no improvement    Discussed results/diagnosis/plan with patient in clinic. Strict precautions given to patient to monitor for worsening signs and symptoms. Advised to follow up with PCP or specialist.    Explained side effects of medications prescribed with patient and informed him/her to discontinue use if he/she has any side effects and to inform UC or PCP if this occurs. All questions answered. Strict ED verses clinic return precautions stressed and given in depth. Advised if symptoms worsens of fail to improve he/she should go to the Emergency Room. Discharge and follow-up instructions given verbally/printed with the patient who expressed understanding and willingness to comply with my recommendations. Patient voiced understanding and in agreement with current treatment plan. Patient exits the exam room in no acute distress. Conversant and engaged during discharge discussion, verbalized understanding.      Viral URI with cough  -     guaiFENesin (MUCINEX) 600 mg 12 hr tablet; Take 2 tablets (1,200 mg total) by mouth 2 (two) times daily. for 10 days  Dispense: 40 tablet; Refill: 0  -      cetirizine (ZYRTEC) 10 MG tablet; Take 1 tablet (10 mg total) by mouth once daily.  Dispense: 30 tablet; Refill: 0  -     fluticasone propionate (FLONASE) 50 mcg/actuation nasal spray; 1 spray (50 mcg total) by Each Nostril route once daily.  Dispense: 16 g; Refill: 0  -     benzonatate (TESSALON) 200 MG capsule; Take 1 capsule (200 mg total) by mouth 3 (three) times daily as needed.  Dispense: 30 capsule; Refill: 0    Laryngitis             Additional MDM:     Heart Failure Score:   COPD = No

## 2024-12-14 NOTE — PATIENT INSTRUCTIONS
General Discharge Instructions   PLEASE READ YOUR DISCHARGE INSTRUCTIONS ENTIRELY AS IT CONTAINS IMPORTANT INFORMATION.  If you were prescribed a narcotic or controlled medication, do not drive or operate heavy equipment or machinery while taking these medications.  If you were prescribed antibiotics, please take them to completion.  You must understand that you've received an Urgent Care treatment only and that you may be released before all your medical problems are known or treated. You, the patient, will arrange for follow up care as instructed.    OVER THE COUNTER RECOMMENDATIONS/SUGGESTIONS.    Make sure to stay well hydrated.    Use Nasal Saline to mechanically move any post nasal drip from your eustachian tube or from the back of your throat.    Use warm salt water gargles to ease your throat pain. Warm salt water gargles as needed for sore throat- 1/2 tsp salt to 1 cup warm water, gargle as desired.    Use an antihistamine such as Claritin, Zyrtec or Allegra to dry you out.    Use pseudoephedrine (behind the counter) to decongest. Pseudoephedrine 30 mg up to 240 mg /day. It can raise your blood pressure and give you palpitations.    Use mucinex (guaifenesin) to break up mucous up to 2400mg/day to loosen any mucous.    The mucinex DM pill has a cough suppressant that can be sedating. It can be used at night to stop the tickle at the back of your throat.    You can use Mucinex D (it has guaifenesin and a high dose of pseudoephedrine) in the mornings to help decongest.    Use Afrin in each nare for no longer than 3 days, as it is addictive. It can also dry out your mucous membranes and cause elevated blood pressure. This is especially useful if you are flying.    Use Flonase 1-2 sprays/nostril per day. It is a local acting steroid nasal spray, if you develop a bloody nose, stop using the medication immediately.    Sometimes Nyquil at night is beneficial to help you get some rest, however it is sedating and it  does have an antihistamine, and tylenol.    Honey is a natural cough suppressant that can be used.    Tylenol up to 4,000 mg a day is safe for short periods and can be used for body aches, pain, and fever. However in high doses and prolonged use it can cause liver irritation.    Ibuprofen is a non-steroidal anti-inflammatory that can be used for body aches, pain, and fever.However it can also cause stomach irritation if over used.     Follow up with your PCP or specialty clinic as instructed in the next 2-3 days if not improved or as needed. You can call [phone] to schedule an appointment with appropriate provider.      If you condition worsens, we recommend that you receive another evaluation at the emergency room immediately or contact your primary medical clinic's after hours call service to discuss your concerns.      Please return here or go to the Emergency Department for any concerns or worsening condition.   You can also call [phone] to schedule an appointment with the appropriate provider.    Please return here or go to the Emergency Department for any concerns or worsening of condition.    Thank you for choosing [facility]!    Our goal in the Urgent Care is to always provide outstanding medical care. You may receive a survey by mail or e-mail in the next week regarding your experience today. We would greatly appreciate you completing and returning the survey. Your feedback provides us with a way to recognize our staff who provide very good care, and it helps us learn how to improve when your experience was below our aspiration of excellence.      We appreciate you trusting us with your medical care. We hope you feel better soon. We will be happy to take care of you for all of your future medical needs.    Sincerely,    CLINTON Suarez  Patient Instructions   PLEASE READ YOUR DISCHARGE INSTRUCTIONS ENTIRELY AS IT CONTAINS IMPORTANT INFORMATION.        Please drink plenty of  "fluids. You body needs increased water but other beverages may aid in comfort.  You will know that you have had enough water to be hydrated when your urine is clear or at least a very pale yellow. Nasal saline may help with removal of mucus as well.  Ibuprofen is preferred for aches and pains as well as fever reduction. If you do not have high blood pressure, then you may use a decongestant such as pseudoephedrine or one of the above medications that have the letter, "-D" following it.  Hot tea with honey can help with sore throats as the heat with reduce the inflammation and the honey will coat your throat to help it feel better. Prescription pain medicine should be used for the significant throat sxs you are experiencing. Do not drive after taking this medication.     Lastly, good hand washing and cough hygiene (cough into your elbow) will help prevent the spread of the illness.  A general rule is that you are no longer contagious once you have been without a fever for over 24 hours without requiring fever reducing medications.   Please get plenty of rest.     Please return here or go to the Emergency Department for any concerns or worsening of condition.     Please take an over the counter antihistamine medication (allegra/Claritin/Zyrtec) of your choice as directed, they may help with some of the runny nose symptoms if you are having them.       Can continue mucinex. Use mucinex (guaifenisin) to break up mucous up to 2400mg/day to loosen any mucous. The mucinex DM pill has a cough suppressant that can be used at night to stop the tickle at the back of your throat. You may use Mucinex to help thin thick secretions to allow you to expel them but it only works if you drink more water.     If not allergic, please take over the counter Tylenol (Acetaminophen) and/or Motrin (Ibuprofen) as directed for control of pain and/or fever.  Please follow up with your primary care doctor or specialist as needed.     Sore throat " recommendations: Warm fluids, warm salt water gargles, throat lozenges, tea, honey, soup, rest, hydration.     Use over the counter flonase: one spray each nostril twice daily OR two sprays each nostril once daily.      Sinus rinses DO NOT USE TAP WATER, if you must, water must be a rolling boil for 1 minute, let it cool, then use.  May use distilled water, or over the counter nasal saline rinses.  Vics vapor rub in shower to help open nasal passages.  May use nasal gel to keep passages moisturized.  May use Nasal saline sprays during the day for added relief of congestion.   For those who go to the gym, please do not use the sauna or steam room now to clear sinuses.     If you  smoke, please stop smoking.        Please return or see your primary care doctor if you develop new or worsening symptoms.      Please arrange follow up with your primary medical clinic as soon as possible. You must understand that you've received an Urgent Care treatment only and that you may be released before all of your medical problems are known or treated. You, the patient, will arrange for follow up as instructed. If your symptoms worsen or fail to improve you should go to the Emergency Room.

## 2025-01-30 DIAGNOSIS — Z78.0 MENOPAUSE: ICD-10-CM

## 2025-02-11 ENCOUNTER — OFFICE VISIT (OUTPATIENT)
Dept: INTERNAL MEDICINE | Facility: CLINIC | Age: 70
End: 2025-02-11
Payer: MEDICARE

## 2025-02-11 VITALS
SYSTOLIC BLOOD PRESSURE: 118 MMHG | DIASTOLIC BLOOD PRESSURE: 76 MMHG | OXYGEN SATURATION: 97 % | WEIGHT: 135.81 LBS | HEART RATE: 62 BPM | HEIGHT: 61 IN | BODY MASS INDEX: 25.64 KG/M2

## 2025-02-11 DIAGNOSIS — M85.80 OSTEOPENIA, UNSPECIFIED LOCATION: ICD-10-CM

## 2025-02-11 DIAGNOSIS — Z12.11 SCREENING FOR COLON CANCER: ICD-10-CM

## 2025-02-11 DIAGNOSIS — J06.9 VIRAL URI WITH COUGH: ICD-10-CM

## 2025-02-11 DIAGNOSIS — G89.29 CHRONIC MIDLINE LOW BACK PAIN WITHOUT SCIATICA: ICD-10-CM

## 2025-02-11 DIAGNOSIS — R73.9 HYPERGLYCEMIA: ICD-10-CM

## 2025-02-11 DIAGNOSIS — I10 ESSENTIAL HYPERTENSION: ICD-10-CM

## 2025-02-11 DIAGNOSIS — E55.9 MILD VITAMIN D DEFICIENCY: ICD-10-CM

## 2025-02-11 DIAGNOSIS — Z23 VARICELLA VACCINATION: ICD-10-CM

## 2025-02-11 DIAGNOSIS — M25.562 PAIN IN BOTH KNEES, UNSPECIFIED CHRONICITY: Primary | ICD-10-CM

## 2025-02-11 DIAGNOSIS — J01.00 ACUTE NON-RECURRENT MAXILLARY SINUSITIS: ICD-10-CM

## 2025-02-11 DIAGNOSIS — M54.50 CHRONIC MIDLINE LOW BACK PAIN WITHOUT SCIATICA: ICD-10-CM

## 2025-02-11 DIAGNOSIS — E78.5 HYPERLIPIDEMIA, UNSPECIFIED HYPERLIPIDEMIA TYPE: ICD-10-CM

## 2025-02-11 DIAGNOSIS — M81.0 AGE-RELATED OSTEOPOROSIS WITHOUT CURRENT PATHOLOGICAL FRACTURE: ICD-10-CM

## 2025-02-11 DIAGNOSIS — M25.561 PAIN IN BOTH KNEES, UNSPECIFIED CHRONICITY: Primary | ICD-10-CM

## 2025-02-11 DIAGNOSIS — M54.9 BACK PAIN, UNSPECIFIED BACK LOCATION, UNSPECIFIED BACK PAIN LATERALITY, UNSPECIFIED CHRONICITY: ICD-10-CM

## 2025-02-11 PROCEDURE — 3078F DIAST BP <80 MM HG: CPT | Mod: CPTII,S$GLB,, | Performed by: INTERNAL MEDICINE

## 2025-02-11 PROCEDURE — 3008F BODY MASS INDEX DOCD: CPT | Mod: CPTII,S$GLB,, | Performed by: INTERNAL MEDICINE

## 2025-02-11 PROCEDURE — 1159F MED LIST DOCD IN RCRD: CPT | Mod: CPTII,S$GLB,, | Performed by: INTERNAL MEDICINE

## 2025-02-11 PROCEDURE — 99214 OFFICE O/P EST MOD 30 MIN: CPT | Mod: S$GLB,,, | Performed by: INTERNAL MEDICINE

## 2025-02-11 PROCEDURE — 1125F AMNT PAIN NOTED PAIN PRSNT: CPT | Mod: CPTII,S$GLB,, | Performed by: INTERNAL MEDICINE

## 2025-02-11 PROCEDURE — 99999 PR PBB SHADOW E&M-EST. PATIENT-LVL V: CPT | Mod: PBBFAC,,, | Performed by: INTERNAL MEDICINE

## 2025-02-11 PROCEDURE — 3074F SYST BP LT 130 MM HG: CPT | Mod: CPTII,S$GLB,, | Performed by: INTERNAL MEDICINE

## 2025-02-11 RX ORDER — ATORVASTATIN CALCIUM 20 MG/1
20 TABLET, FILM COATED ORAL DAILY
Qty: 90 TABLET | Refills: 3 | Status: SHIPPED | OUTPATIENT
Start: 2025-02-11

## 2025-02-11 RX ORDER — BUDESONIDE AND FORMOTEROL FUMARATE DIHYDRATE 160; 4.5 UG/1; UG/1
2 AEROSOL RESPIRATORY (INHALATION) EVERY 12 HOURS
Qty: 10.2 G | Refills: 6 | Status: SHIPPED | OUTPATIENT
Start: 2025-02-11 | End: 2026-02-11

## 2025-02-11 RX ORDER — MELOXICAM 15 MG/1
15 TABLET ORAL DAILY
Qty: 10 TABLET | Refills: 0 | Status: SHIPPED | OUTPATIENT
Start: 2025-02-11

## 2025-02-11 RX ORDER — TRAZODONE HYDROCHLORIDE 50 MG/1
50 TABLET ORAL NIGHTLY
Qty: 90 TABLET | Refills: 3 | Status: SHIPPED | OUTPATIENT
Start: 2025-02-11 | End: 2026-02-11

## 2025-02-11 RX ORDER — EZETIMIBE 10 MG/1
10 TABLET ORAL DAILY
Qty: 90 TABLET | Refills: 3 | Status: SHIPPED | OUTPATIENT
Start: 2025-02-11 | End: 2026-02-11

## 2025-02-11 RX ORDER — CETIRIZINE HYDROCHLORIDE 10 MG/1
10 TABLET ORAL DAILY
Qty: 90 TABLET | Refills: 3 | Status: SHIPPED | OUTPATIENT
Start: 2025-02-11 | End: 2025-03-13

## 2025-02-27 ENCOUNTER — PATIENT MESSAGE (OUTPATIENT)
Dept: INTERNAL MEDICINE | Facility: CLINIC | Age: 70
End: 2025-02-27
Payer: MEDICARE

## 2025-02-28 ENCOUNTER — CLINICAL SUPPORT (OUTPATIENT)
Dept: REHABILITATION | Facility: HOSPITAL | Age: 70
End: 2025-02-28
Attending: INTERNAL MEDICINE
Payer: MEDICARE

## 2025-02-28 DIAGNOSIS — G89.29 CHRONIC MIDLINE LOW BACK PAIN WITHOUT SCIATICA: ICD-10-CM

## 2025-02-28 DIAGNOSIS — R29.898 DECREASED STRENGTH OF TRUNK AND BACK: Primary | ICD-10-CM

## 2025-02-28 DIAGNOSIS — M54.50 CHRONIC MIDLINE LOW BACK PAIN WITHOUT SCIATICA: ICD-10-CM

## 2025-02-28 PROCEDURE — 97750 PHYSICAL PERFORMANCE TEST: CPT | Mod: 32

## 2025-02-28 NOTE — PROGRESS NOTES
[facility]  Physical Therapy Lumbar Evaluation      Name: Dayana Su  Clinic Number: [id number]    Therapy Diagnosis:   Encounter Diagnoses   Name Primary?    Chronic midline low back pain without sciatica     Decreased strength of trunk and back Yes     Physician: Landy Marie MD    Physician Orders: PT Eval and Treat  Medical Diagnosis from Referral: M54.50,G89.29 (ICD-10-CM) - Chronic midline low back pain without sciatica  Evaluation Date: 2/28/2025  Authorization Period Expiration: 2/11/2026  Plan of Care Expiration: 5/9/2025  Reassessment Due: 3/28/2025  Visit # / Visits authorized: 1/1  MedX testing visit 2    Time In: 11:00 AM  Time Out: 12:00 PM  Total Billable Time: 60 minutes  INSURANCE and OUTCOMES: Fee for Service with FOTO Outcomes 1/3    Precautions: standard, HTN, and B knee OA    Pattern of pain determined: 1 PEP    Subjective     Date of onset: about 5-6 months ago  History of current condition: Dayana reports low back pain since November.  Pain started without incident.  Pt says pain in back worsens as the day goes on.  She has bilateral knee OA, left worse than right, and has difficulty standing or walking for long periods of time due to knee and back pain.  She take tumeric which she thinks helps.  She will take tylenol when she is on her feet for a long day.       Medical History:   Past Medical History:   Diagnosis Date    Asthma     H/O tubal ligation 1978    H/O: hysterectomy 1986    Hyperlipidemia     Hypertension        Surgical History:   Dayana Su  has a past surgical history that includes Tubal ligation; Hysterectomy (01/01/1986); and Oophorectomy.    Medications:   Dayana has a current medication list which includes the following prescription(s): albuterol, amlodipine, aspirin, atorvastatin, biotin, budesonide-formoterol 160-4.5 mcg, cetirizine, cholecalciferol (vitamin d3), ezetimibe, fish oil-omega-3  "fatty acids, fluticasone propionate, meloxicam, multivitamin-calcium carb, nitroglycerin, pulse oximeter, trazodone, trazodone, vitamin b complex, [DISCONTINUED] diclofenac sodium, and [DISCONTINUED] olopatadine.    Allergies:   Review of patient's allergies indicates:   Allergen Reactions    Pcn [penicillins] Hives        Imaging: No relevant imaging on file imaging ordered    Prior Therapy: yes  Prior Treatment: nothing for low back  Social History:  lives with their spouse  Occupation: retired  Leisure: [employer] activities  Prior Level of Function: independent  Current Level of Function: independent  DME owned/used: none  Gym Membership: [employer], just started back    Pain:  Current 4/10, worst 8/10, best 0/10   Location: bilateral low back  Description: Aching and Dull  Aggravating Factors: Sitting, Standing, and Walking for long periods of time  Easing Factors:  tylenol or Aleve  Disturbed Sleep: no    Pattern of pain questions:  1.  Where is your pain the worst? Low back  2.  Is your pain constant or intermittent? intermittent  3.  Does bending forward make your typical pain worse? no  4.  Since the start of your back pain, has there been a change in your bowel or bladder? no  5.  What can't you do now that you use to be able to do? Hard time doing [employer] activities for long periods of time    Pts goals: "strengthen back"    Red Flag Screening:   Cough/Sneeze Strain: (--)  Bladder/Bowel: (--)  Falls: (--)  Night pain: (--)  Unexplained weight loss: (--)  General health: good    Objective      Postural examination/scapula alignment: Head forward and bilateral genu varum  Correction of posture: better with lumbar roll  Sitting: slouched  Standing: mild anterior pelvic tilt    MOVEMENT LOSS - Lumbar   Norms ROM Loss Initial   Flexion Fingers touch toes, sacral angle >/= 70 deg, uniform spinal curvature, posterior weight shift  minimal loss   Extension ASIS surpasses toes, spine of scapulae surpasses heels, " uniform spinal curve within functional limits   Side glide Right  within functional limits   Side glide Left  within functional limits   Rotation Right PT observes contralateral shoulder minimal loss   Rotation Left PT observes contralateral shoulder minimal loss     Lower Extremity Strength  Right LE  Left LE    Hip flexion: 4+/5 Hip flexion: 4+/5   Hip extension:  4-/5 Hip extension: 4-/5   Hip abduction: 4/5 Hip abduction: 4/5   Hip adduction:  4+/5 Hip adduction:  4+/5   Hip External Rotation 4/5 Hip External Rotation 4/5   Hip Internal rotation   4/5 Hip Internal rotation 4/5   Knee Flexion 4-/5 Knee Flexion 4-/5   Knee Extension 4/5 Knee Extension 4/5   Ankle dorsiflexion: 4+/5 Ankle dorsiflexion: 4+/5   Ankle plantarflexion: 4/5 Ankle plantarflexion: 4/5     GAIT:  Assistive Device used: none  Level of Assistance: independent  Patient displays the following gait deviations: unsteady gait, decreased step length, increased base of support, decreased weight shift, and antalgic gait.     Special Tests:   Test Name  Test Result   Prone Instability Test (--)   SI Joint Provocation Test (--)   Straight Leg Raise (--)   Neural Tension Test (--)   Crossed Straight Leg Raise (--)   Walking on toes Able   Walking on heels  Able  KNEE PAIN     NEUROLOGICAL SCREENING:     Sensory deficits: intact to light touch    Reflexes:    Left Right   Patella Tendon 2+ 2+   Achilles Tendon 2+ 2+   Babinski  NT NT   Clonus (--) (--)     REPEATED TEST MOVEMENTS:    Baseline symptoms:  Repeated Flexion in Standing end range pain  pain during motion  worse   Repeated Extension in Standing better   Repeated Flexion in lying no effect  Some knee pain   Repeated Extension in lying  better       STATIC TESTS and other movements:   Prone lie no effect   Prone lie on elbows better   Sitting slouched  worse   Sitting erect better   Standing slouched no effect   Standing erect  no effect   Lying prone in extension  better   Long sitting   NT    Sustained flexion NT   Sustained prone using mat NT     Lumbar testing Visit 2      Intake Outcome Measure for FOTO Lumbar Survey    Therapist reviewed FOTO scores for Dayana Su on 2/28/2025.   FOTO report - see Media section or FOTO account episode details.    Intake Score: 63%  Goal: 69%           Treatment     Total Treatment time separate from Evaluation: 20 minutes    Dayana received therapeutic exercises to develop/improve posture, lumbar ROM, strength, and muscular endurance for 10 minutes including the following exercises:     LTR x 10  EIS x 10  EIL x 10  Transverse abdominal activation x 10    Written Home Exercises Provided: Patient instructed to cont prior HEP.    HEP AS FOLLOWS:  LTR,EIS, EIL, transverse abdominal activation    Exercises were reviewed and Dayana was able to demonstrate them prior to the end of the session. Dayana demonstrated good  understanding of the education provided.     See EMR under Patient Instructions for exercises provided 2/28/2025.    MedX Testing:  MedX testing to be performed next visit        2/28/2025    12:00 PM   [facility]   Visit Number 1   VAS Pain Rating 4         Therapeutic Education/Activity provided for 10 minutes:   - Patient was given an [facility] Visit 1 handout which discusses the following:  - what to expect in therapy  - an overview of the program, including health coaching and wellness  - importance of spinal hygiene, proper posture, lifting mechanics, sleep quality, and nutrition/hydration   - Sneha roll trialed, recommended, and purchase information was provided.  - Patient received a handout regarding anticipated muscular soreness following the isometric test and strategies for management were reviewed with patient including stretching, using ice and scheduled rest.   - Patient received verbal education on the following:   - [facility] program   - purpose of the isometric test  - safe progression of lumbar  strengthening, wellness approach, and systemic strengthening.   - safe usage of MedX machine and testing protocols.        Assessment     Dayana is a 69 y.o. female referred to [facility] with a medical diagnosis of M54.50,G89.29 (ICD-10-CM) - Chronic midline low back pain without sciatica. Pt presents with decreased core and trunk strength, decreased soft tissue flexibility, decreased lower extremity strength, postural impairments and decreased tolerance for daily activity.  All of the above noted supports potential lumbar classification as a pattern 1 PEP with recurrent/or consistent symptoms, thus pt is a good candidate for the [facility] Program. Pt would benefit from LE and trunk mobility training, stability training,  improved cardiovascular and muscular endurance, neuromuscular re-education for posture, coordination, and muscular recruitment and education on positional offloading techniques to decrease the intensity and frequency of flare-ups.       Pain Pattern: 1 PEP       Pt prognosis is Good.     Pt will benefit from skilled outpatient Physical Therapy to address the deficits stated above and in the chart below, to provide pt/family education, and to maximize pt's level of independence. Based on the above history and physical examination an active physical therapy program is recommended.      Plan of care discussed with patient: Yes  Pt's spiritual, cultural and educational needs considered and patient is agreeable to the plan of care and goals as stated below:     Anticipated Barriers for therapy: bilateral knee OA    PT Evaluation Completed? MedX testing next session    Medical necessity is demonstrated by the following problem list:    History  Co-morbidities and personal factors that may impact the plan of care [] LOW: no personal factors / co-morbidities  [x] MODERATE: 1-2 personal factors / co-morbidities  [] HIGH: 3+ personal factors / co-morbidities    Moderate / High Support  Documentation:   Co-morbidities affecting plan of care: bilateral knee OA    Personal Factors:   no deficits     Examination  Body Structures and Functions, activity limitations and participation restrictions that may impact the plan of care [] LOW: addressing 1-2 elements  [x] MODERATE: 3+ elements  [] HIGH: 4+ elements (please support below)    Moderate / High Support Documentation:     Clinical Presentation [] LOW: stable  [x] MODERATE: Evolving  [] HIGH: Unstable     Decision Making/ Complexity Score: moderate         GOALS: Pt is in agreement with the following goals.    Short term goals:  6 weeks or 10 visits   - Pt will demonstrate increased lumbar MedX ROM by at least 3 degrees from the initial ROM value with improvements noted in functional ROM and ability to perform ADLs. Appropriate and Ongoing  - Pt will demonstrate increased MedX average isometric strength value by 25% from initial test resulting in improved ability to perform bending, lifting, and carrying activities safely, confidently. Appropriate and Ongoing  - Pt will report a reduction in worst pain score by 1-2 points for improved tolerance for standing for [employer] activities. Appropriate and Ongoing  - Pt able to perform HEP correctly with minimal cueing or supervision from therapist to encourage independent management of symptoms. Appropriate and Ongoing    Long term goals: 10 weeks or 20 visits   - Pt will demonstrate increased lumbar MedX ROM by at least 6 degrees from initial ROM value, resulting in improved ability to perform functional forward bending while standing and sitting. Appropriate and Ongoing  - Pt will demonstrate increased MedX average isometric strength value by 45% from initial test resulting in improved ability to perform bending, lifting, and carrying activities safely and confidently. Appropriate and Ongoing  - Pt to demonstrate ability to independently control and reduce their pain through posture positioning and  mechanical movements throughout a typical day. Appropriate and Ongoing  - Pt will demonstrate reduced pain and improved functional outcomes as reported on the FOTO by reaching an intake score of >/= 69% functional ability in order to demonstrate subjective improvement in patient's condition. . Appropriate and Ongoing  - Pt will demonstrate independence with the HEP at discharge. Appropriate and Ongoing  - Pt will be able to participate in full day of activities without increase in low back pain by end of the day (patient goal) Appropriate and Ongoing    Plan     Outpatient physical therapy 2x week for 10 weeks or 20 visits to include the following:   - Patient education  - Therapeutic exercise  - Manual therapy  - Performance testing   - Neuromuscular Re-education  - Therapeutic activity   - Modalities    Pt may be seen by PTA as part of the rehabilitation team.     Therapist: Rachel Groves, PT  2/28/2025

## 2025-03-05 NOTE — PROGRESS NOTES
Subjective:       Patient ID: Dayana Su is a 69 y.o. female.    Chief Complaint: Annual Exam (With back and knee pain, as well as sinus issues. States she's also experiencing sleeplessness.)    HPI Breathing is doing okay.  No CP or SOB. Some insomnia.  Some arthritic complaints.    Review of Systems   Constitutional:  Negative for activity change and unexpected weight change.   HENT:  Negative for hearing loss, rhinorrhea and trouble swallowing.    Eyes:  Negative for discharge and visual disturbance.   Respiratory:  Negative for chest tightness, shortness of breath (PND or orthopnea) and wheezing.    Cardiovascular:  Negative for chest pain and palpitations.   Gastrointestinal:  Negative for abdominal pain, blood in stool, constipation, diarrhea, nausea and vomiting.   Endocrine: Negative for polydipsia and polyuria.   Genitourinary:  Negative for difficulty urinating, dysuria, hematuria and menstrual problem.   Musculoskeletal:  Positive for arthralgias and joint swelling. Negative for neck pain.   Neurological:  Negative for seizures, syncope, weakness and headaches.   Psychiatric/Behavioral:  Negative for confusion and dysphoric mood.        Objective:      Physical Exam  Constitutional:       General: She is not in acute distress.     Appearance: She is well-developed.   HENT:      Head: Normocephalic.   Eyes:      Pupils: Pupils are equal, round, and reactive to light.   Neck:      Thyroid: No thyromegaly.      Vascular: No JVD.   Cardiovascular:      Rate and Rhythm: Normal rate and regular rhythm.      Heart sounds: Normal heart sounds. No murmur heard.     No friction rub. No gallop.   Pulmonary:      Effort: Pulmonary effort is normal.      Breath sounds: Normal breath sounds. No wheezing or rales.   Abdominal:      General: Bowel sounds are normal. There is no distension.      Palpations: Abdomen is soft. There is no mass.      Tenderness: There is no abdominal tenderness. There is no  guarding or rebound.   Musculoskeletal:      Cervical back: Neck supple.   Lymphadenopathy:      Cervical: No cervical adenopathy.   Skin:     General: Skin is warm and dry.   Neurological:      Mental Status: She is alert and oriented to person, place, and time.      Deep Tendon Reflexes: Reflexes are normal and symmetric.   Psychiatric:         Behavior: Behavior normal.         Thought Content: Thought content normal.         Judgment: Judgment normal.         Assessment:       1. Pain in both knees, unspecified chronicity    2. Viral URI with cough    3. Acute non-recurrent maxillary sinusitis    4. Back pain, unspecified back location, unspecified back pain laterality, unspecified chronicity    5. Essential hypertension    6. Hyperlipidemia, unspecified hyperlipidemia type    7. Hyperglycemia    8. Mild vitamin D deficiency    9. Osteopenia, unspecified location    10. Screening for colon cancer    11. Varicella vaccination    12. Age-related osteoporosis without current pathological fracture    13. Chronic midline low back pain without sciatica        Plan:   Pain in both knees, unspecified chronicity  -     Ambulatory referral/consult to Orthopedics; Future; Expected date: 02/18/2025  -     X-ray AP Standing Knees with Both Latera; Future; Expected date: 02/11/2025    Viral URI with cough  -     cetirizine (ZYRTEC) 10 MG tablet; Take 1 tablet (10 mg total) by mouth once daily.  Dispense: 90 tablet; Refill: 3    Acute non-recurrent maxillary sinusitis    Back pain, unspecified back location, unspecified back pain laterality, unspecified chronicity  -     X-Ray Lumbar Spine AP And Lateral; Future; Expected date: 02/11/2025    Essential hypertension  -     CBC Auto Differential; Future; Expected date: 02/11/2025  -     Comprehensive Metabolic Panel; Future; Expected date: 02/11/2025  -     TSH; Future; Expected date: 02/11/2025    Hyperlipidemia, unspecified hyperlipidemia type  -     Lipid Panel; Future;  Expected date: 02/11/2025    Hyperglycemia  -     Hemoglobin A1C; Future; Expected date: 02/11/2025    Mild vitamin D deficiency  -     Vitamin D; Future; Expected date: 02/11/2025    Osteopenia, unspecified location  -     DXA Bone Density Axial Skeleton 1 or more sites; Future; Expected date: 02/11/2025    Screening for colon cancer  -     Fecal Immunochemical Test (iFOBT); Future; Expected date: 02/11/2025    Varicella vaccination  -     VARICELLA ZOSTER ANTIBODY, IGG; Future; Expected date: 02/11/2025    Age-related osteoporosis without current pathological fracture  -     DXA Bone Density Axial Skeleton 1 or more sites; Future; Expected date: 02/11/2025    Chronic midline low back pain without sciatica  -     Ambulatory referral/consult to [facility]; Future; Expected date: 02/18/2025    Other orders  -     ezetimibe (ZETIA) 10 mg tablet; Take 1 tablet (10 mg total) by mouth once daily.  Dispense: 90 tablet; Refill: 3  -     traZODone (DESYREL) 50 MG tablet; Take 1 tablet (50 mg total) by mouth every evening.  Dispense: 90 tablet; Refill: 3  -     budesonide-formoterol 160-4.5 mcg (SYMBICORT) 160-4.5 mcg/actuation HFAA; Inhale 2 puffs into the lungs every 12 (twelve) hours. Controller  Dispense: 10.2 g; Refill: 6  -     atorvastatin (LIPITOR) 20 MG tablet; Take 1 tablet (20 mg total) by mouth once daily.  Dispense: 90 tablet; Refill: 3  -     meloxicam (MOBIC) 15 MG tablet; Take 1 tablet (15 mg total) by mouth once daily.  Dispense: 10 tablet; Refill: 0

## 2025-03-06 ENCOUNTER — CLINICAL SUPPORT (OUTPATIENT)
Dept: REHABILITATION | Facility: HOSPITAL | Age: 70
End: 2025-03-06
Attending: INTERNAL MEDICINE
Payer: MEDICARE

## 2025-03-06 DIAGNOSIS — R29.898 DECREASED STRENGTH OF TRUNK AND BACK: Primary | ICD-10-CM

## 2025-03-06 PROCEDURE — 97112 NEUROMUSCULAR REEDUCATION: CPT

## 2025-03-06 PROCEDURE — 97110 THERAPEUTIC EXERCISES: CPT

## 2025-03-06 NOTE — PROGRESS NOTES
"[facility] Physical Therapy Treatment      Name: Dayana Pina Washington  Clinic Number: [id number]    Therapy Diagnosis:   Encounter Diagnosis   Name Primary?    Decreased strength of trunk and back Yes     Physician: Landy Marie MD    Visit Date: 3/6/2025    Physician Orders: PT Eval and Treat  Medical Diagnosis from Referral: M54.50,G89.29 (ICD-10-CM) - Chronic midline low back pain without sciatica  Evaluation Date: 2/28/2025  Authorization Period Expiration: 2/11/2026  Plan of Care Expiration: 5/9/2025  Reassessment Due: 3/28/2025  Visit # / Visits authorized: 2/13 (eval + 12 visits)  MedX testing visit 2    Time In: 1:05 PM  Time Out: 1:55 PM  Total Billable Time: 50 minutes  INSURANCE and OUTCOMES: Fee for Service with FOTO Outcomes 1/3    Precautions:  standard, HTN, and B knee OA    Pattern of pain determined: 1 PEP    Subjective   Dayana reports no low back pain today, does have some mid back pain around right shoulder blade today.      Patient reports tolerating previous visit without adverse effects  Patient reports their pain to be  3 /10 on a 0-10 scale with 0 being no pain and 10 being the worst pain imaginable.  Pain Location: back     Occupation: retired  Leisure: Advent activities  Pt goals:  "strengthen back"     Objective     Lumbar IM Testing Results:     Date of testing:  3/6/25   ROM 0-39 deg   Max Peak Torque 99    Min Peak Torque 56    Flex/Ext Ratio 1.8:1   % below normative data 12%     Outcomes:  Initial score:  63%   Visit 5 score:  Goal: 69%      Treatment    Dayana received the treatments listed below:      Dayana received neuromuscular education    MedX testing performed day 2: Patient  received neuromuscular education to engage spinal musculature correctly for motor control and engagement of musculature for 15 minutes including the MedX exercise component and practice and standard testing. MedX dynamic exercise and baseline isometric test performed with " instructions to guide the patient safely through the testing procedure. Patient instructed to perform isometric test correctly and safely while building to an optimal force with a pain-free effort. Patient also instructed that they should feel support/pressure from MedX restraints but no pain/discomfort, and encouraged to report any pain to therapist. Patient demonstrated appropriate understanding of information and tolerance of test.  Education regarding purpose of test, safety during test given, and reviewed possible more soreness and strategies.           3/6/2025     1:45 PM   [facility] Therapy   Visit Number 2   VAS Pain Rating 3   Time 5   Lumbar Stretches - Slouch Overcorrection 10   Extension in Lying 10   Extension in Standing 10   Lumbar Extension Seat Pad 1   Femur Restraint 6   Top Dead Center 24   Counterweight 156   Lumbar Flexion 39   Lumbar Extension 0   Lumbar Peak Torque 99 ft. lbs.   Min Torque 56   Test Percent Below Normative Data 12 %   Ice - Z Lie (in min.) 0         therapeutic exercises to develop strength, endurance, ROM, flexibility, posture, and core stabilization for 35 minutes including:    LTR x 10  EIS x 10  EIL x 10  Transverse abdominal activation x 10  + SOC x 10  + BKFO x 10  + bridges x 15    Peripheral muscle strengthening which included 1 set of 15-20 repetitions at a slow, controlled 10-13 second per rep pace focused on strengthening supporting musculature for improved body mechanics and functional mobility.  Pt and therapist focused on proper form during treatment to ensure optimal strengthening of each targeted muscle group.  Machines were utilized including torso rotation, leg press, hip abd and hip add, leg ext.  Leg curl, triceps, biceps, chest and row added visit 3    therapeutic activities to improve functional performance for 00  minutes, including:    manual therapy techniques:  were applied to the: low back for 00 minutes, including:     cold pack for 0 minutes to  low back    Home Exercises Provided and Patient Education Provided   Home exercises include: LTR,EIS, EIL, transverse abdominal activation   Cardio program: visit 5  Lifting education date: visit 11  Posture/Lumbar roll: recommended  Fridge Magnet Discharge handout (date given):  Equipment at home/gym membership: yes      Education provided:   - PT role and POC  - HEP  - MedX testing results  - pacing and extension hold for max strength and endurance gains  - RPE scale    Written Home Exercises Provided: Patient instructed to cont prior HEP.  Exercises were reviewed and Dayana was able to demonstrate them prior to the end of the session.  Dayana demonstrated good  understanding of the education provided.     See EMR under Patient Instructions for exercises provided prior visit.    Assessment     Pt presents to second healthy back visit reporting minimal low back pain.  She was able to demo HEP with Min VC for form. Isometric strength testing performed today. She demonstrates 12% strength deficits as compared to women the same age indicating impaired motor control of lumbar paraspinals.  Pt was also able to complete half of the peripheral strengthening exercises without increased discomfort and will complete the complete circuit next visit as tolerated.    Patient is making good progress towards established goals.  Pt will continue to benefit from skilled outpatient physical therapy to address the deficits stated in the impairment chart, provide pt/family education and to maximize pt's level of independence in the home and community environment.     Anticipated Barriers for therapy: bilateral knee OA     Pt's spiritual, cultural and educational needs considered and pt agreeable to plan of care and goals as stated below:     Goals:   Short term goals:  6 weeks or 10 visits   - Pt will demonstrate increased lumbar MedX ROM by at least 3 degrees from the initial ROM value with improvements noted in functional ROM and  ability to perform ADLs. Appropriate and Ongoing  - Pt will demonstrate increased MedX average isometric strength value by 25% from initial test resulting in improved ability to perform bending, lifting, and carrying activities safely, confidently. Appropriate and Ongoing  - Pt will report a reduction in worst pain score by 1-2 points for improved tolerance for standing for [employer] activities. Appropriate and Ongoing  - Pt able to perform HEP correctly with minimal cueing or supervision from therapist to encourage independent management of symptoms. Appropriate and Ongoing     Long term goals: 10 weeks or 20 visits   - Pt will demonstrate increased lumbar MedX ROM by at least 6 degrees from initial ROM value, resulting in improved ability to perform functional forward bending while standing and sitting. Appropriate and Ongoing  - Pt will demonstrate increased MedX average isometric strength value by 45% from initial test resulting in improved ability to perform bending, lifting, and carrying activities safely and confidently. Appropriate and Ongoing  - Pt to demonstrate ability to independently control and reduce their pain through posture positioning and mechanical movements throughout a typical day. Appropriate and Ongoing  - Pt will demonstrate reduced pain and improved functional outcomes as reported on the FOTO by reaching an intake score of >/= 69% functional ability in order to demonstrate subjective improvement in patient's condition. . Appropriate and Ongoing  - Pt will demonstrate independence with the HEP at discharge. Appropriate and Ongoing  - Pt will be able to participate in full day of activities without increase in low back pain by end of the day (patient goal) Appropriate and Ongoing      Plan   Continue with established Plan of Care towards established PT goals.       Therapist: Rachel Groves, PT  3/6/2025

## 2025-03-07 NOTE — PROGRESS NOTES
"[facility] Physical Therapy Treatment      Name: Dayana Pina Washington  Clinic Number: [id number]    Therapy Diagnosis:   Encounter Diagnosis   Name Primary?    Decreased strength of trunk and back Yes     Physician: Landy Marie MD    Visit Date: 3/10/2025    Physician Orders: PT Eval and Treat  Medical Diagnosis from Referral: M54.50,G89.29 (ICD-10-CM) - Chronic midline low back pain without sciatica  Evaluation Date: 2/28/2025  Authorization Period Expiration: 2/11/2026  Plan of Care Expiration: 5/9/2025  Reassessment Due: 3/28/2025  Visit # / Visits authorized: 3/13 (eval + 12 visits)  MedX testing visit 2    Time In: 10:15 AM (Patient with late arrival)   Time Out: 11:05 AM   Total Billable Time: 25 minutes 1:1 time  INSURANCE and OUTCOMES: Fee for Service with FOTO Outcomes 1/3    Precautions:  standard, HTN, and B knee OA    Pattern of pain determined: 1 PEP    Subjective   Dayana reports chronic lower back pain and knee pain due to arthritis. States her knees are "bone on bone".  Some soreness after MedX testing.     Patient reports tolerating previous visit: Min soreness  Patient reports their pain to be 3/10 on a 0-10 scale with 0 being no pain and 10 being the worst pain imaginable.  Pain Location: back     Occupation: retired  Leisure: Mormonism activities  Pt goals:  "strengthen back"     Objective     Lumbar IM Testing Results:     Date of testing:  3/6/25   ROM 0-39 deg   Max Peak Torque 99    Min Peak Torque 56    Flex/Ext Ratio 1.8:1   % below normative data 12%     Outcomes:  Initial score:  63%   Visit 5 score:  Goal: 69%      Treatment    Dayana received the treatments listed below:    Dayana received neuromuscular education  to isolate and engage spinal stabilization musculature correctly for motor control and coordination to aid in function and posture for 10 minutes on the Medical Medx Machine.  Patient performed MedX dynamic exercise with emphasis on spinal muscular control " using pacer throughout  active range of motion. Therapist assisted patient in achieving optimal exertion for neural reeducation and endurance training by using the  Rodo Exertion Rating scale, by instructing the patient to aim for mid range of exertion, performing 15-20 repetitions, slowly, correctly,and safely.            3/10/2025    10:28 AM   [facility] Therapy - Short   Visit Number 3   VAS Pain Rating 3   Time 5   Lumbar Stretches - Slouch 10   Extension in Lying 10   Extension in Standing 10   Flexion in Lying 10   Lumbar Weight 49 lbs   Repetitions 15   Rating of Perceived Exertion 3      therapeutic exercises to develop strength, endurance, ROM, flexibility, posture, and core stabilization for 40 minutes including:    EIS x 10  LTR x 10  +DKTC x 10  EIL x 10  Transverse abdominal activation x 10  BKFO w/ YTBx 10  bridges w/ YTB x 15  SOC x 10      Peripheral muscle strengthening which included 1 set of 15-20 repetitions at a slow, controlled 10-13 second per rep pace focused on strengthening supporting musculature for improved body mechanics and functional mobility.  Pt and therapist focused on proper form during treatment to ensure optimal strengthening of each targeted muscle group.  Machines were utilized including torso rotation, leg press, hip abd and hip add, leg ext.  Leg curl, triceps, biceps, chest and row added visit 3      manual therapy techniques:  were applied to the: low back for 00 minutes, including:     cold pack for 5 minutes to low back    Home Exercises Provided and Patient Education Provided   Home exercises include: LTR,EIS, EIL, transverse abdominal activation   Cardio program: visit 5  Lifting education date: visit 11  Posture/Lumbar roll: recommended  Fridge Magnet Discharge handout (date given):  Equipment at home/gym membership: yes      Education provided:   - cues w/exs  MedX performance  Precor ex performance  HB protocol    Written Home Exercises Provided: Patient instructed  to cont prior HEP.  Exercises were reviewed and Dayana was able to demonstrate them prior to the end of the session.  Dayana demonstrated good  understanding of the education provided.     See EMR under Patient Instructions for exercises provided prior visit.    Assessment   Dayana returns to her third healthy back visit reporting chronic back and B knee arthritic discomfort. Pain level is minimal currently. She reports some soreness after MedX testing last visit and was educated that this  Delayed onset muscular soreness is normal and should improve as treatments are progressed. Treatment continued with flexibility, strengthening and neuromuscular reeducation ex's. Added DKTC stretch, YTB for bridging with band and BKFO.  She was able to perform ex's with cues and without increased pain. Lumbar MedX resistance was initiated at 49 ft/lbs and she completed 15 reps with a RPE = 3/10 (Min cues initially for pacing and extension hold but did better with reps). She completed peripheral strengthening ex's without c/o. Will continue per HB protocol and patient tolerance.    Patient is making progress towards established goals.  Pt will continue to benefit from skilled outpatient physical therapy to address the deficits stated in the impairment chart, provide pt/family education and to maximize pt's level of independence in the home and community environment.     Anticipated Barriers for therapy: bilateral knee OA     Pt's spiritual, cultural and educational needs considered and pt agreeable to plan of care and goals as stated below:     Goals:   Short term goals:  6 weeks or 10 visits   - Pt will demonstrate increased lumbar MedX ROM by at least 3 degrees from the initial ROM value with improvements noted in functional ROM and ability to perform ADLs. Appropriate and Ongoing  - Pt will demonstrate increased MedX average isometric strength value by 25% from initial test resulting in improved ability to perform bending,  lifting, and carrying activities safely, confidently. Appropriate and Ongoing  - Pt will report a reduction in worst pain score by 1-2 points for improved tolerance for standing for Sikhism activities. Appropriate and Ongoing  - Pt able to perform HEP correctly with minimal cueing or supervision from therapist to encourage independent management of symptoms. Appropriate and Ongoing     Long term goals: 10 weeks or 20 visits   - Pt will demonstrate increased lumbar MedX ROM by at least 6 degrees from initial ROM value, resulting in improved ability to perform functional forward bending while standing and sitting. Appropriate and Ongoing  - Pt will demonstrate increased MedX average isometric strength value by 45% from initial test resulting in improved ability to perform bending, lifting, and carrying activities safely and confidently. Appropriate and Ongoing  - Pt to demonstrate ability to independently control and reduce their pain through posture positioning and mechanical movements throughout a typical day. Appropriate and Ongoing  - Pt will demonstrate reduced pain and improved functional outcomes as reported on the FOTO by reaching an intake score of >/= 69% functional ability in order to demonstrate subjective improvement in patient's condition. . Appropriate and Ongoing  - Pt will demonstrate independence with the HEP at discharge. Appropriate and Ongoing  - Pt will be able to participate in full day of activities without increase in low back pain by end of the day (patient goal) Appropriate and Ongoing    Plan   Continue with established Plan of Care towards established PT goals.       Therapist: Rjei Mcgarry, PTA  3/7/2025

## 2025-03-10 ENCOUNTER — CLINICAL SUPPORT (OUTPATIENT)
Dept: REHABILITATION | Facility: HOSPITAL | Age: 70
End: 2025-03-10
Payer: MEDICARE

## 2025-03-10 DIAGNOSIS — R29.898 DECREASED STRENGTH OF TRUNK AND BACK: Primary | ICD-10-CM

## 2025-03-10 PROCEDURE — 97112 NEUROMUSCULAR REEDUCATION: CPT | Mod: CQ

## 2025-03-10 PROCEDURE — 97110 THERAPEUTIC EXERCISES: CPT | Mod: CQ

## 2025-03-12 ENCOUNTER — CLINICAL SUPPORT (OUTPATIENT)
Dept: REHABILITATION | Facility: HOSPITAL | Age: 70
End: 2025-03-12
Payer: MEDICARE

## 2025-03-12 DIAGNOSIS — R29.898 DECREASED STRENGTH OF TRUNK AND BACK: Primary | ICD-10-CM

## 2025-03-12 PROCEDURE — 97112 NEUROMUSCULAR REEDUCATION: CPT | Mod: CQ

## 2025-03-12 PROCEDURE — 97110 THERAPEUTIC EXERCISES: CPT | Mod: CQ

## 2025-03-12 NOTE — PROGRESS NOTES
"[facility] Physical Therapy Treatment      Name: Dayana Pina Washington  Clinic Number: [id number]    Therapy Diagnosis:   Encounter Diagnosis   Name Primary?    Decreased strength of trunk and back Yes     Physician: Landy Marie MD    Visit Date: 3/12/2025    Physician Orders: PT Eval and Treat  Medical Diagnosis from Referral: M54.50,G89.29 (ICD-10-CM) - Chronic midline low back pain without sciatica  Evaluation Date: 2/28/2025  Authorization Period Expiration: 2/11/2026  Plan of Care Expiration: 5/9/2025  Reassessment Due: 3/28/2025  Visit # / Visits authorized: 4/13 (eval + 12 visits)  MedX testing visit 2    Time In: 10:00 AM   Time Out: 10:55 AM   Total Billable Time: 25 minutes 1:1 time  INSURANCE and OUTCOMES: Fee for Service with FOTO Outcomes 1/3    Precautions:  standard, HTN, and B knee OA    Pattern of pain determined: 1 PEP    Subjective   Dayana reports chronic lower back pain and knee pain due to arthritis. States her knees are "bone on bone".  States that she felt pretty good after her first full follow-up session last visit.    Patient reports tolerating previous visit: Min soreness  Patient reports their pain to be 3/10 on a 0-10 scale with 0 being no pain and 10 being the worst pain imaginable.  Pain Location: back     Occupation: retired  Leisure: Gnosticist activities  Pt goals:  "strengthen back"     Objective     Lumbar IM Testing Results:     Date of testing:  3/6/25   ROM 0-39 deg   Max Peak Torque 99    Min Peak Torque 56    Flex/Ext Ratio 1.8:1   % below normative data 12%     Outcomes:  Initial score:  63%   Visit 5 score:  Goal: 69%      Treatment    Dayana received the treatments listed below:    Dayana received neuromuscular education  to isolate and engage spinal stabilization musculature correctly for motor control and coordination to aid in function and posture for 10 minutes on the Medical Medx Machine.  Patient performed MedX dynamic exercise with emphasis on " spinal muscular control using pacer throughout  active range of motion. Therapist assisted patient in achieving optimal exertion for neural reeducation and endurance training by using the  Rodo Exertion Rating scale, by instructing the patient to aim for mid range of exertion, performing 15-20 repetitions, slowly, correctly,and safely.          3/12/2025    10:35 AM   [facility] Therapy - Short   Visit Number 4   VAS Pain Rating 3   Time 5   Lumbar Stretches - Slouch 10   Extension in Lying 10   Extension in Standing 10   Flexion in Lying 10   Lumbar Weight 49 lbs   Repetitions 20   Rating of Perceived Exertion 3       therapeutic exercises to develop strength, endurance, ROM, flexibility, posture, and core stabilization for 40 minutes including:    EIS x 10  LTR x 10  DKTC x 10  EIL x 10  Transverse abdominal activation x 10 + SLR  BKFO w/ YTBx 15  bridges w/ YTB x 20  SOC x 10    Peripheral muscle strengthening which included 1 set of 15-20 repetitions at a slow, controlled 10-13 second per rep pace focused on strengthening supporting musculature for improved body mechanics and functional mobility.  Pt and therapist focused on proper form during treatment to ensure optimal strengthening of each targeted muscle group.  Machines were utilized including torso rotation, leg press, hip abd and hip add, leg ext.  Leg curl, triceps, biceps, chest and row added visit 3    manual therapy techniques:  were applied to the: low back for 00 minutes, including:     cold pack for 5 minutes to low back    Home Exercises Provided and Patient Education Provided   Home exercises include: LTR,EIS, EIL, transverse abdominal activation   Cardio program: visit 5  Lifting education date: visit 11  Posture/Lumbar roll: recommended  Fridge Magnet Discharge handout (date given):  Equipment at home/gym membership: yes      Education provided:   - cues w/exs  MedX performance  Precor ex performance  HB protocol    Written Home Exercises  Provided: Patient instructed to cont prior HEP.  Exercises were reviewed and Dayana was able to demonstrate them prior to the end of the session.  Dayana demonstrated good  understanding of the education provided.     See EMR under Patient Instructions for exercises provided prior visit.    Assessment   Dayana returns to her 4th healthy back visit reporting chronic back and B knee arthritic discomfort. Pain level is minimal currently. Treatment continued with flexibility, strengthening and neuromuscular reeducation ex's. Added SLR w/TrA activation and progressed reps for bridging with band and BKFO.  She was able to perform ex's with cues and without increased pain. Lumbar MedX resistance was maintained at 49 ft/lbs and she completed 20 reps with a RPE = 3/10. She completed peripheral strengthening ex's without c/o. Will continue per HB protocol and patient tolerance.    Patient is making progress towards established goals.  Pt will continue to benefit from skilled outpatient physical therapy to address the deficits stated in the impairment chart, provide pt/family education and to maximize pt's level of independence in the home and community environment.     Anticipated Barriers for therapy: bilateral knee OA     Pt's spiritual, cultural and educational needs considered and pt agreeable to plan of care and goals as stated below:     Goals:   Short term goals:  6 weeks or 10 visits   - Pt will demonstrate increased lumbar MedX ROM by at least 3 degrees from the initial ROM value with improvements noted in functional ROM and ability to perform ADLs. Appropriate and Ongoing  - Pt will demonstrate increased MedX average isometric strength value by 25% from initial test resulting in improved ability to perform bending, lifting, and carrying activities safely, confidently. Appropriate and Ongoing  - Pt will report a reduction in worst pain score by 1-2 points for improved tolerance for standing for [employer]ology activities.  Appropriate and Ongoing  - Pt able to perform HEP correctly with minimal cueing or supervision from therapist to encourage independent management of symptoms. Appropriate and Ongoing     Long term goals: 10 weeks or 20 visits   - Pt will demonstrate increased lumbar MedX ROM by at least 6 degrees from initial ROM value, resulting in improved ability to perform functional forward bending while standing and sitting. Appropriate and Ongoing  - Pt will demonstrate increased MedX average isometric strength value by 45% from initial test resulting in improved ability to perform bending, lifting, and carrying activities safely and confidently. Appropriate and Ongoing  - Pt to demonstrate ability to independently control and reduce their pain through posture positioning and mechanical movements throughout a typical day. Appropriate and Ongoing  - Pt will demonstrate reduced pain and improved functional outcomes as reported on the FOTO by reaching an intake score of >/= 69% functional ability in order to demonstrate subjective improvement in patient's condition. . Appropriate and Ongoing  - Pt will demonstrate independence with the HEP at discharge. Appropriate and Ongoing  - Pt will be able to participate in full day of activities without increase in low back pain by end of the day (patient goal) Appropriate and Ongoing    Plan   Continue with established Plan of Care towards established PT goals.     Therapist: Reji Mcgarry, PTA  3/12/2025

## 2025-03-19 ENCOUNTER — CLINICAL SUPPORT (OUTPATIENT)
Dept: REHABILITATION | Facility: HOSPITAL | Age: 70
End: 2025-03-19
Payer: MEDICARE

## 2025-03-19 DIAGNOSIS — R29.898 DECREASED STRENGTH OF TRUNK AND BACK: Primary | ICD-10-CM

## 2025-03-19 PROCEDURE — 97112 NEUROMUSCULAR REEDUCATION: CPT | Mod: CQ

## 2025-03-19 PROCEDURE — 97110 THERAPEUTIC EXERCISES: CPT | Mod: CQ

## 2025-03-19 NOTE — PROGRESS NOTES
"[facility] Treatment      Name: Dayana Pina Washington  Clinic Number: [id number]    Therapy Diagnosis:   Encounter Diagnosis   Name Primary?    Decreased strength of trunk and back Yes     Physician: Landy Marie MD    Visit Date: 3/19/2025    Physician Orders: PT Eval and Treat  Medical Diagnosis from Referral: M54.50,G89.29 (ICD-10-CM) - Chronic midline low back pain without sciatica  Evaluation Date: 2/28/2025  Authorization Period Expiration: 2/11/2026  Plan of Care Expiration: 5/9/2025  Reassessment Due: 3/28/2025  Visit # / Visits authorized: 5/13 (eval + 12 visits)  MedX testing visit 2    Time In: 10:00 AM   Time Out: 10:45 AM   Total Billable Time: 25 minutes 1:1 time  INSURANCE and OUTCOMES: Fee for Service with FOTO Outcomes 1/3    Precautions:  standard, HTN, and B knee OA    Pattern of pain determined: 1 PEP    Subjective   Dayana reports chronic lower back pain and knee pain due to arthritis. States her knees are "bone on bone" ( R worse than L)  States that her knees are more bothersome than her back currently (Moderate).     Patient reports tolerating previous visit: soreness  Patient reports their pain to be 3/10 on a 0-10 scale with 0 being no pain and 10 being the worst pain imaginable.  Pain Location: back     Occupation: retired  Leisure: Pentecostalism activities  Pt goals:  "strengthen back"     Objective     Lumbar IM Testing Results:     Date of testing:  3/6/25   ROM 0-39 deg   Max Peak Torque 99    Min Peak Torque 56    Flex/Ext Ratio 1.8:1   % below normative data 12%     Outcomes:  Initial score:  63%   Visit 5 score:  Goal: 69%      Treatment    Dayana received the treatments listed below:    Dayana received neuromuscular education  to isolate and engage spinal stabilization musculature correctly for motor control and coordination to aid in function and posture for 10 minutes on the Medical Medx Machine.  Patient performed MedX dynamic exercise with " emphasis on spinal muscular control using pacer throughout  active range of motion. Therapist assisted patient in achieving optimal exertion for neural reeducation and endurance training by using the  Rodo Exertion Rating scale, by instructing the patient to aim for mid range of exertion, performing 15-20 repetitions, slowly, correctly,and safely.          3/19/2025    10:23 AM   Healthy[facility] Therapy - Short   Visit Number 5   VAS Pain Rating 3   Time 5   Lumbar Stretches - Slouch 10   Extension in Lying 10   Extension in Standing 10   Flexion in Lying 10   Lumbar Weight 52 lbs   Repetitions 15   Rating of Perceived Exertion 3           therapeutic exercises to develop strength, endurance, ROM, flexibility, posture, and core stabilization for 35 minutes including:    EIS x 10  LTR x 10  DKTC x 10  EIL x 10  Transverse abdominal activation x 10 + SLR + 1#  BKFO w/ RTB x 15  bridges w/ RTB x 20  +SL clamshells RTB x 10  SOC x 10    Peripheral muscle strengthening which included 1 set of 15-20 repetitions at a slow, controlled 10-13 second per rep pace focused on strengthening supporting musculature for improved body mechanics and functional mobility.  Pt and therapist focused on proper form during treatment to ensure optimal strengthening of each targeted muscle group.  Machines were utilized including torso rotation, leg press, hip abd and hip add, leg ext.  Leg curl, triceps, biceps, chest and row added visit 3    manual therapy techniques:  were applied to the: low back for 00 minutes, including:     cold pack for 00 minutes to low back ( Patient declines)    Home Exercises Provided and Patient Education Provided   Home exercises include: LTR,EIS, EIL, transverse abdominal activation   Cardio program: visit 5 benefits of cardio handout reviewed and provided (Patient walks when she can)   Lifting education date: visit 11  Posture/Lumbar roll: recommended  Fridge Magnet Discharge handout (date given):  Equipment at  home/gym membership: yes      Education provided:   - cues w/exs  MedX performance  Precor ex performance  HB protocol    Written Home Exercises Provided: Patient instructed to cont prior HEP.  Exercises were reviewed and Dayana was able to demonstrate them prior to the end of the session.  Dayana demonstrated good  understanding of the education provided.     See EMR under Patient Instructions for exercises provided prior visit.    Assessment   Dayana returns to her 5th healthy back visit reporting chronic back and B knee arthritic discomfort. Back Pain level is minimal currently but knee pain can be moderate to severe. Treatment continued with flexibility, strengthening and neuromuscular reeducation ex's. Added light resistance for SLR w/TrA activation and progressed to RTB for bridging with band and BKFO.  She was able to perform ex's with cues and without increased pain. Lumbar MedX resistance was increased to 52 ft/lbs and she completed 15 reps with a RPE = 3/10. She completed peripheral strengthening ex's without c/o (Leg press and leg extension were deferred today due to knee pain). Will continue per HB protocol and patient tolerance. Recommended follow-up with Orthopedic MD for additional treatment options for her knee.    Patient is making progress towards established goals.  Pt will continue to benefit from skilled outpatient physical therapy to address the deficits stated in the impairment chart, provide pt/family education and to maximize pt's level of independence in the home and community environment.     Anticipated Barriers for therapy: bilateral knee OA     Pt's spiritual, cultural and educational needs considered and pt agreeable to plan of care and goals as stated below:     Goals:   Short term goals:  6 weeks or 10 visits   - Pt will demonstrate increased lumbar MedX ROM by at least 3 degrees from the initial ROM value with improvements noted in functional ROM and ability to perform ADLs.  Appropriate and Ongoing  - Pt will demonstrate increased MedX average isometric strength value by 25% from initial test resulting in improved ability to perform bending, lifting, and carrying activities safely, confidently. Appropriate and Ongoing  - Pt will report a reduction in worst pain score by 1-2 points for improved tolerance for standing for [employer] activities. Appropriate and Ongoing  - Pt able to perform HEP correctly with minimal cueing or supervision from therapist to encourage independent management of symptoms. Appropriate and Ongoing     Long term goals: 10 weeks or 20 visits   - Pt will demonstrate increased lumbar MedX ROM by at least 6 degrees from initial ROM value, resulting in improved ability to perform functional forward bending while standing and sitting. Appropriate and Ongoing  - Pt will demonstrate increased MedX average isometric strength value by 45% from initial test resulting in improved ability to perform bending, lifting, and carrying activities safely and confidently. Appropriate and Ongoing  - Pt to demonstrate ability to independently control and reduce their pain through posture positioning and mechanical movements throughout a typical day. Appropriate and Ongoing  - Pt will demonstrate reduced pain and improved functional outcomes as reported on the FOTO by reaching an intake score of >/= 69% functional ability in order to demonstrate subjective improvement in patient's condition. . Appropriate and Ongoing  - Pt will demonstrate independence with the HEP at discharge. Appropriate and Ongoing  - Pt will be able to participate in full day of activities without increase in low back pain by end of the day (patient goal) Appropriate and Ongoing    Plan   Continue with established Plan of Care towards established PT goals.     Therapist: Reji Mcgarry, PTA  3/19/2025

## 2025-03-24 ENCOUNTER — CLINICAL SUPPORT (OUTPATIENT)
Dept: REHABILITATION | Facility: HOSPITAL | Age: 70
End: 2025-03-24
Payer: MEDICARE

## 2025-03-24 DIAGNOSIS — R29.898 DECREASED STRENGTH OF TRUNK AND BACK: Primary | ICD-10-CM

## 2025-03-24 PROCEDURE — 97110 THERAPEUTIC EXERCISES: CPT | Mod: CQ

## 2025-03-24 PROCEDURE — 97112 NEUROMUSCULAR REEDUCATION: CPT | Mod: CQ

## 2025-03-24 NOTE — PROGRESS NOTES
"[facility] Treatment      Name: Dayana Pina Washington  Clinic Number: [id number]    Therapy Diagnosis:   Encounter Diagnosis   Name Primary?    Decreased strength of trunk and back Yes     Physician: Landy Marie MD    Visit Date: 3/24/2025    Physician Orders: PT Eval and Treat  Medical Diagnosis from Referral: M54.50,G89.29 (ICD-10-CM) - Chronic midline low back pain without sciatica  Evaluation Date: 2025  Authorization Period Expiration: 2026  Plan of Care Expiration: 2025  Reassessment Due: 3/28/2025  Visit # / Visits authorized:  (eval + 12 visits)  MedX testing visit 2    Time In: 10:08 AM (Patient with late arrival)  Time Out: 10:58 AM   Total Billable Time: 50 minutes   INSURANCE and OUTCOMES: Fee for Service with FOTO Outcomes 1/3    Precautions:  standard, HTN, and B knee OA    Pattern of pain determined: 1 PEP    Subjective   Dayana reports chronic lower back pain and knee pain due to arthritis. States her knees are "bone on bone" ( R worse than L)  States that her knees are more bothersome than her back currently (Moderate).  States that modifications with peripheral LE strengthening last visit were helpful and she didn't have increased soreness in her knees afterwards. States that she has not made an appt with Orthopedic MD as recommended re: her knee issues. Also reports attending a  over the weekend which involved lots of standing, states her back held up well and she used some pain patches for her knees.     Patient reports tolerating previous visit: muscular soreness  Patient reports their pain to be 3/10 on a 0-10 scale with 0 being no pain and 10 being the worst pain imaginable.  Pain Location: back     Occupation: retired  Leisure: Jain activities  Pt goals:  "strengthen back"     Objective     Lumbar IM Testing Results:     Date of testing:  3/6/25   ROM 0-39 deg   Max Peak Torque 99    Min Peak Torque 56    Flex/Ext Ratio 1.8:1   % " below normative data 12%     Outcomes:  Initial score:  63%   Visit 6 score: 76%  Goal: 69%          Treatment    Dayana received the treatments listed below:    Dayana received neuromuscular education  to isolate and engage spinal stabilization musculature correctly for motor control and coordination to aid in function and posture for 10 minutes on the Medical Medx Machine.  Patient performed MedX dynamic exercise with emphasis on spinal muscular control using pacer throughout  active range of motion. Therapist assisted patient in achieving optimal exertion for neural reeducation and endurance training by using the  Rodo Exertion Rating scale, by instructing the patient to aim for mid range of exertion, performing 15-20 repetitions, slowly, correctly,and safely.            3/24/2025    10:19 AM   Healthy[facility] Therapy - Short   Visit Number 6   VAS Pain Rating 3   Time 5   Lumbar Stretches - Slouch 10   Extension in Lying 10   Extension in Standing 10   Flexion in Lying 10   Lumbar Flexion 42   Lumbar Extension 0   Lumbar Weight 52 lbs   Repetitions 20   Rating of Perceived Exertion 4       therapeutic exercises to develop strength, endurance, ROM, flexibility, posture, and core stabilization for 40 minutes including:    EIS x 10  LTR x 10  DKTC x 10  EIL x 10  Transverse abdominal activation x 15 + SLR + 1#  BKFO w/ GTB x 15  bridges w/ GTBx 20  SL clamshells GTB x 10  SOC x 10  +Seated Thoracic extension stretch w/1/2 foam roll x 10    Peripheral muscle strengthening which included 1 set of 15-20 repetitions at a slow, controlled 10-13 second per rep pace focused on strengthening supporting musculature for improved body mechanics and functional mobility.  Pt and therapist focused on proper form during treatment to ensure optimal strengthening of each targeted muscle group.  Machines were utilized including torso rotation, leg press, hip abd and hip add, leg ext.  Leg curl, triceps, biceps, chest and row added  visit 3    manual therapy techniques:  were applied to the: low back for 00 minutes, including:     cold pack for 00 minutes to low back ( Patient declines)    Home Exercises Provided and Patient Education Provided   Home exercises include: LTR,EIS, EIL, transverse abdominal activation   Cardio program: visit 5 benefits of cardio handout reviewed and provided (Patient walks when she can)   Lifting education date: visit 11  Posture/Lumbar roll: recommended  Fridge Magnet Discharge handout (date given):  Equipment at home/gym membership: yes      Education provided:   - cues w/exs  MedX performance  Precor ex performance  HB protocol    Written Home Exercises Provided: Patient instructed to cont prior HEP.  Exercises were reviewed and Dayana was able to demonstrate them prior to the end of the session.  Dayana demonstrated good  understanding of the education provided.     See EMR under Patient Instructions for exercises provided prior visit.    Assessment   Dayana returns to her 6th healthy back visit reporting chronic back and B knee arthritic discomfort. Back Pain level is minimal currently but knee pain can be moderate to severe. Treatment continued with flexibility, strengthening and neuromuscular reeducation ex's. She was progressed with increased reps for SLR w/TrA activation and progressed to GTB for bridging with band, clamshells and BKFO.  Also added seated thoracic extension stretch. She was able to perform ex's with cues and without increased pain. Lumbar MedX resistance was maintained at 52 ft/lbs and she completed 18 reps with a RPE = 4/10. She completed peripheral strengthening ex's without c/o (Leg press and leg extension were deferred today due to knee pain). Will continue per HB protocol and patient tolerance. Updated FOTO scoring reflects some improvements. Recommended follow-up with Orthopedic MD for additional treatment options for her knee.    Patient is making progress towards established  goals.  Pt will continue to benefit from skilled outpatient physical therapy to address the deficits stated in the impairment chart, provide pt/family education and to maximize pt's level of independence in the home and community environment.     Anticipated Barriers for therapy: bilateral knee OA     Pt's spiritual, cultural and educational needs considered and pt agreeable to plan of care and goals as stated below:     Goals:   Short term goals:  6 weeks or 10 visits   - Pt will demonstrate increased lumbar MedX ROM by at least 3 degrees from the initial ROM value with improvements noted in functional ROM and ability to perform ADLs. Appropriate and Ongoing  - Pt will demonstrate increased MedX average isometric strength value by 25% from initial test resulting in improved ability to perform bending, lifting, and carrying activities safely, confidently. Appropriate and Ongoing  - Pt will report a reduction in worst pain score by 1-2 points for improved tolerance for standing for Mandaeism activities. Appropriate and Ongoing  - Pt able to perform HEP correctly with minimal cueing or supervision from therapist to encourage independent management of symptoms. Appropriate and Ongoing     Long term goals: 10 weeks or 20 visits   - Pt will demonstrate increased lumbar MedX ROM by at least 6 degrees from initial ROM value, resulting in improved ability to perform functional forward bending while standing and sitting. Appropriate and Ongoing  - Pt will demonstrate increased MedX average isometric strength value by 45% from initial test resulting in improved ability to perform bending, lifting, and carrying activities safely and confidently. Appropriate and Ongoing  - Pt to demonstrate ability to independently control and reduce their pain through posture positioning and mechanical movements throughout a typical day. Appropriate and Ongoing  - Pt will demonstrate reduced pain and improved functional outcomes as reported on  the FOTO by reaching an intake score of >/= 69% functional ability in order to demonstrate subjective improvement in patient's condition. . Appropriate and Ongoing  - Pt will demonstrate independence with the HEP at discharge. Appropriate and Ongoing  - Pt will be able to participate in full day of activities without increase in low back pain by end of the day (patient goal) Appropriate and Ongoing    Plan   Continue with established Plan of Care towards established PT goals.     Therapist: Reji Mcgarry, PTA  3/24/2025

## 2025-03-26 ENCOUNTER — CLINICAL SUPPORT (OUTPATIENT)
Dept: REHABILITATION | Facility: HOSPITAL | Age: 70
End: 2025-03-26
Payer: MEDICARE

## 2025-03-26 DIAGNOSIS — R29.898 DECREASED STRENGTH OF TRUNK AND BACK: Primary | ICD-10-CM

## 2025-03-26 PROCEDURE — 97110 THERAPEUTIC EXERCISES: CPT

## 2025-03-26 PROCEDURE — 97112 NEUROMUSCULAR REEDUCATION: CPT

## 2025-03-26 NOTE — PROGRESS NOTES
"[facility]      Name: Dayana Pina Washington  Clinic Number: [id number]    Therapy Diagnosis:   Encounter Diagnosis   Name Primary?    Decreased strength of trunk and back Yes     Physician: Landy Marie MD    Visit Date: 3/26/2025    Physician Orders: PT Eval and Treat  Medical Diagnosis from Referral: M54.50,G89.29 (ICD-10-CM) - Chronic midline low back pain without sciatica  Evaluation Date: 2/28/2025  Authorization Period Expiration: 2/11/2026  Plan of Care Expiration: 5/9/2025  Reassessment Due: 3/28/2025  Visit # / Visits authorized: 7/13 (eval + 12 visits)  MedX testing visit 2    Time In: 10:30 AM   Time Out: 11:25 AM   Total Billable Time: 50 minutes   INSURANCE and OUTCOMES: Fee for Service with FOTO Outcomes 1/3    Precautions:  standard, HTN, and B knee OA    Pattern of pain determined: 1 PEP    Subjective   Dayana continues to have mild low back pain and continued bilateral knee pain.  Treatment continued with flexibility, strengthening and neuromuscular reeducation ex's. Added open book stretches to address soft tissue limitation.  She was able to perform ex's with cues and without increased pain. Lumbar MedX resistance was increased to  55 ft/lbs and she completed 18 reps with a RPE = 3/10. She completed peripheral strengthening ex's, except leg press and knee extension, without c/o. Will continue per HB protocol and patient tolerance.    Patient reports tolerating previous visit: muscular soreness  Patient reports their pain to be 3/10 on a 0-10 scale with 0 being no pain and 10 being the worst pain imaginable.  Pain Location: back     Occupation: retired  Leisure: Restoration activities  Pt goals:  "strengthen back"     Objective     Lumbar IM Testing Results:     Date of testing:  3/6/25   ROM 0-39 deg   Max Peak Torque 99    Min Peak Torque 56    Flex/Ext Ratio 1.8:1   % below normative data 12%     Outcomes:  Initial score:  63%   Visit 6 score: 76%  Goal: " 69%        Treatment    Dayana received the treatments listed below:    Dayana received neuromuscular education  to isolate and engage spinal stabilization musculature correctly for motor control and coordination to aid in function and posture for 10 minutes on the Medical Medx Machine.  Patient performed MedX dynamic exercise with emphasis on spinal muscular control using pacer throughout  active range of motion. Therapist assisted patient in achieving optimal exertion for neural reeducation and endurance training by using the  Rodo Exertion Rating scale, by instructing the patient to aim for mid range of exertion, performing 15-20 repetitions, slowly, correctly,and safely.            3/26/2025    12:03 PM   [facility]   Visit Number 7   VAS Pain Rating 4   Time 5   Lumbar Stretches - Slouch Overcorrection 10   Extension in Lying 10   Extension in Standing 10   Flexion in Lying 10   Lumbar Weight 55 lbs   Repetitions 18   Rating of Perceived Exertion 3   Ice - Z Lie (in min.) 5          therapeutic exercises to develop strength, endurance, ROM, flexibility, posture, and core stabilization for 40 minutes including:    EIS x 10  LTR x 10  DKTC x 10  EIL x 10  Transverse abdominal activation x 15 + SLR + 1.5#  BKFO w/ GTB x 15  bridges w/ GTBx 20  SL clamshells GTB x 10  SOC x 10  Seated Thoracic extension stretch w/1/2 foam roll x 10  + open books x 10    Peripheral muscle strengthening which included 1 set of 15-20 repetitions at a slow, controlled 10-13 second per rep pace focused on strengthening supporting musculature for improved body mechanics and functional mobility.  Pt and therapist focused on proper form during treatment to ensure optimal strengthening of each targeted muscle group.  Machines were utilized including torso rotation, leg press, hip abd and hip add, leg ext.  Leg curl, triceps, biceps, chest and row added visit 3    manual therapy techniques:  were applied to the: low back for 00  minutes, including:     cold pack for 00 minutes to low back ( Patient declines)    Home Exercises Provided and Patient Education Provided   Home exercises include: LTR,EIS, EIL, transverse abdominal activation   Cardio program: visit 5 benefits of cardio handout reviewed and provided (Patient walks when she can)   Lifting education date: visit 11  Posture/Lumbar roll: recommended  Fridge Magnet Discharge handout (date given):  Equipment at home/gym membership: yes      Education provided:   - cues w/exs  MedX performance  Precor ex performance  HB protocol    Written Home Exercises Provided: Patient instructed to cont prior HEP.  Exercises were reviewed and Dayana was able to demonstrate them prior to the end of the session.  Dayana demonstrated good  understanding of the education provided.     See EMR under Patient Instructions for exercises provided prior visit.    Assessment   Dayana returns to her [facility]th [facility] visit reporting chronic back and B knee arthritic discomfort. Back Pain level is minimal currently but knee pain can be moderate to severe. Treatment continued with flexibility, strengthening and neuromuscular reeducation ex's. She was progressed with increased reps for SLR w/TrA activation and progressed to GTB for bridging with band, clamshells and BKFO.  Also added seated thoracic extension stretch. She was able to perform ex's with cues and without increased pain. Lumbar MedX resistance was maintained at 52 ft/lbs and she completed 18 reps with a RPE = 4/10. She completed peripheral strengthening ex's without c/o (Leg press and leg extension were deferred today due to knee pain). Will continue per HB protocol and patient tolerance. Updated FOTO scoring reflects some improvements. Recommended follow-up with Orthopedic MD for additional treatment options for her knee.    Patient is making progress towards established goals.  Pt will continue to benefit from skilled outpatient physical therapy to  address the deficits stated in the impairment chart, provide pt/family education and to maximize pt's level of independence in the home and community environment.     Anticipated Barriers for therapy: bilateral knee OA     Pt's spiritual, cultural and educational needs considered and pt agreeable to plan of care and goals as stated below:     Goals:   Short term goals:  6 weeks or 10 visits   - Pt will demonstrate increased lumbar MedX ROM by at least 3 degrees from the initial ROM value with improvements noted in functional ROM and ability to perform ADLs. Appropriate and Ongoing  - Pt will demonstrate increased MedX average isometric strength value by 25% from initial test resulting in improved ability to perform bending, lifting, and carrying activities safely, confidently. Appropriate and Ongoing  - Pt will report a reduction in worst pain score by 1-2 points for improved tolerance for standing for Worship activities. Appropriate and Ongoing  - Pt able to perform HEP correctly with minimal cueing or supervision from therapist to encourage independent management of symptoms. Appropriate and Ongoing     Long term goals: 10 weeks or 20 visits   - Pt will demonstrate increased lumbar MedX ROM by at least 6 degrees from initial ROM value, resulting in improved ability to perform functional forward bending while standing and sitting. Appropriate and Ongoing  - Pt will demonstrate increased MedX average isometric strength value by 45% from initial test resulting in improved ability to perform bending, lifting, and carrying activities safely and confidently. Appropriate and Ongoing  - Pt to demonstrate ability to independently control and reduce their pain through posture positioning and mechanical movements throughout a typical day. Appropriate and Ongoing  - Pt will demonstrate reduced pain and improved functional outcomes as reported on the FOTO by reaching an intake score of >/= 69% functional ability in order to  demonstrate subjective improvement in patient's condition. . Appropriate and Ongoing  - Pt will demonstrate independence with the HEP at discharge. Appropriate and Ongoing  - Pt will be able to participate in full day of activities without increase in low back pain by end of the day (patient goal) Appropriate and Ongoing    Plan   Continue with established Plan of Care towards established PT goals.     Therapist: Rachel Groves, PT  3/26/2025

## 2025-04-02 ENCOUNTER — CLINICAL SUPPORT (OUTPATIENT)
Dept: REHABILITATION | Facility: HOSPITAL | Age: 70
End: 2025-04-02
Payer: MEDICARE

## 2025-04-02 DIAGNOSIS — R29.898 DECREASED STRENGTH OF TRUNK AND BACK: Primary | ICD-10-CM

## 2025-04-02 PROCEDURE — 97110 THERAPEUTIC EXERCISES: CPT

## 2025-04-02 PROCEDURE — 97112 NEUROMUSCULAR REEDUCATION: CPT

## 2025-04-02 NOTE — PROGRESS NOTES
"[facility]      Name: Dayana Pina Washington  Clinic Number: [id number]    Therapy Diagnosis:   Encounter Diagnosis   Name Primary?    Decreased strength of trunk and back Yes     Physician: Landy Marie MD    Visit Date: 4/2/2025    Physician Orders: PT Eval and Treat  Medical Diagnosis from Referral: M54.50,G89.29 (ICD-10-CM) - Chronic midline low back pain without sciatica  Evaluation Date: 2/28/2025  Authorization Period Expiration: 2/11/2026  Plan of Care Expiration: 5/9/2025  Reassessment Due: 3/28/2025  Visit # / Visits authorized: 8/13 (eval + 12 visits)  MedX testing visit 2    Time In: 10:30 AM   Time Out: 11:20 AM   Total Billable Time: 50 minutes   INSURANCE and OUTCOMES: Fee for Service with FOTO Outcomes 1/3    Precautions:  standard, HTN, and B knee OA    Pattern of pain determined: 1 PEP    Subjective   Dayana     Patient reports tolerating previous visit: muscular soreness  Patient reports their pain to be 3/10 on a 0-10 scale with 0 being no pain and 10 being the worst pain imaginable.  Pain Location: back     Occupation: retired  Leisure: Congregational activities  Pt goals:  "strengthen back"     Objective     Lumbar IM Testing Results:     Date of testing:  3/6/25   ROM 0-39 deg   Max Peak Torque 99    Min Peak Torque 56    Flex/Ext Ratio 1.8:1   % below normative data 12%     Outcomes:  Initial score:  63%   Visit 6 score: 76%  Goal: 69%        Treatment    Dayana received the treatments listed below:    Dayana received neuromuscular education  to isolate and engage spinal stabilization musculature correctly for motor control and coordination to aid in function and posture for 10 minutes on the Medical Medx Machine.  Patient performed MedX dynamic exercise with emphasis on spinal muscular control using pacer throughout  active range of motion. Therapist assisted patient in achieving optimal exertion for neural reeducation and endurance training by using " the  Rodo Exertion Rating scale, by instructing the patient to aim for mid range of exertion, performing 15-20 repetitions, slowly, correctly,and safely.            4/2/2025    11:40 AM   [facility]   Visit Number 8   VAS Pain Rating 1   Time 5   Lumbar Stretches - Slouch Overcorrection 10   Extension in Lying 10   Extension in Standing 10   Flexion in Lying 10   Lumbar Weight 55 lbs   Repetitions 20   Rating of Perceived Exertion 4   Ice - Z Lie (in min.) 0          therapeutic exercises to develop strength, endurance, ROM, flexibility, posture, and core stabilization for 40 minutes including:    EIS x 10  LTR x 10  DKTC x 10  EIL x 10  Transverse abdominal activation x 15 + SLR + 1.5#  BKFO w/ GTB x 15  bridges w/ GTBx 20  SL clamshells GTB x 10  SOC x 10  Seated Thoracic extension stretch w/1/2 foam roll x 10  + open books x 10    Peripheral muscle strengthening which included 1 set of 15-20 repetitions at a slow, controlled 10-13 second per rep pace focused on strengthening supporting musculature for improved body mechanics and functional mobility.  Pt and therapist focused on proper form during treatment to ensure optimal strengthening of each targeted muscle group.  Machines were utilized including torso rotation, leg press, hip abd and hip add, leg ext.  Leg curl, triceps, biceps, chest and row added visit 3    manual therapy techniques:  were applied to the: low back for 00 minutes, including:     cold pack for 00 minutes to low back ( Patient declines)    Home Exercises Provided and Patient Education Provided   Home exercises include: LTR,EIS, EIL, transverse abdominal activation   Cardio program: visit 5 benefits of cardio handout reviewed and provided (Patient walks when she can)   Lifting education date: visit 11  Posture/Lumbar roll: recommended  Fridge Magnet Discharge handout (date given):  Equipment at home/gym membership: yes      Education provided:   - cues w/exs  MedX performance  Precor  ex performance  HB protocol    Written Home Exercises Provided: Patient instructed to cont prior HEP.  Exercises were reviewed and Dayana was able to demonstrate them prior to the end of the session.  Dayana demonstrated good  understanding of the education provided.     See EMR under Patient Instructions for exercises provided prior visit.    Assessment   Dayana continues to have mild low back pain and continued bilateral knee pain.  Treatment continued with flexibility, strengthening and neuromuscular reeducation ex's.  She was able to perform ex's with cues and without increased pain. Lumbar MedX resistance was maintained at 55 ft/lbs and she completed 20 reps with a RPE = 4/10. She completed peripheral strengthening ex's, except leg press and knee extension, without c/o. Will continue per HB protocol and patient tolerance.    Patient is making progress towards established goals.  Pt will continue to benefit from skilled outpatient physical therapy to address the deficits stated in the impairment chart, provide pt/family education and to maximize pt's level of independence in the home and community environment.     Anticipated Barriers for therapy: bilateral knee OA     Pt's spiritual, cultural and educational needs considered and pt agreeable to plan of care and goals as stated below:     Goals:   Short term goals:  6 weeks or 10 visits   - Pt will demonstrate increased lumbar MedX ROM by at least 3 degrees from the initial ROM value with improvements noted in functional ROM and ability to perform ADLs. Appropriate and Ongoing  - Pt will demonstrate increased MedX average isometric strength value by 25% from initial test resulting in improved ability to perform bending, lifting, and carrying activities safely, confidently. Appropriate and Ongoing  - Pt will report a reduction in worst pain score by 1-2 points for improved tolerance for standing for Hinduism activities. Appropriate and Ongoing  - Pt able to  perform HEP correctly with minimal cueing or supervision from therapist to encourage independent management of symptoms. Appropriate and Ongoing     Long term goals: 10 weeks or 20 visits   - Pt will demonstrate increased lumbar MedX ROM by at least 6 degrees from initial ROM value, resulting in improved ability to perform functional forward bending while standing and sitting. Appropriate and Ongoing  - Pt will demonstrate increased MedX average isometric strength value by 45% from initial test resulting in improved ability to perform bending, lifting, and carrying activities safely and confidently. Appropriate and Ongoing  - Pt to demonstrate ability to independently control and reduce their pain through posture positioning and mechanical movements throughout a typical day. Appropriate and Ongoing  - Pt will demonstrate reduced pain and improved functional outcomes as reported on the FOTO by reaching an intake score of >/= 69% functional ability in order to demonstrate subjective improvement in patient's condition. . Appropriate and Ongoing  - Pt will demonstrate independence with the HEP at discharge. Appropriate and Ongoing  - Pt will be able to participate in full day of activities without increase in low back pain by end of the day (patient goal) Appropriate and Ongoing    Plan   Continue with established Plan of Care towards established PT goals.     Therapist: Rachel rGoves, PT  4/2/2025

## 2025-04-04 ENCOUNTER — HOSPITAL ENCOUNTER (OUTPATIENT)
Dept: RADIOLOGY | Facility: HOSPITAL | Age: 70
Discharge: HOME | End: 2025-04-04
Attending: NURSE PRACTITIONER
Payer: MEDICARE

## 2025-04-04 ENCOUNTER — OFFICE VISIT (OUTPATIENT)
Dept: ORTHOPEDICS | Facility: CLINIC | Age: 70
End: 2025-04-04
Payer: MEDICARE

## 2025-04-04 VITALS — HEART RATE: 60 BPM | SYSTOLIC BLOOD PRESSURE: 126 MMHG | DIASTOLIC BLOOD PRESSURE: 68 MMHG

## 2025-04-04 DIAGNOSIS — M25.562 ACUTE PAIN OF BOTH KNEES: Primary | ICD-10-CM

## 2025-04-04 DIAGNOSIS — M25.561 ACUTE PAIN OF BOTH KNEES: ICD-10-CM

## 2025-04-04 DIAGNOSIS — M17.0 PRIMARY OSTEOARTHRITIS OF BOTH KNEES: Primary | ICD-10-CM

## 2025-04-04 DIAGNOSIS — M25.562 ACUTE PAIN OF BOTH KNEES: ICD-10-CM

## 2025-04-04 DIAGNOSIS — M25.561 ACUTE PAIN OF BOTH KNEES: Primary | ICD-10-CM

## 2025-04-04 PROCEDURE — 99999 PR PBB SHADOW E&M-EST. PATIENT-LVL III: CPT | Mod: PBBFAC,,, | Performed by: NURSE PRACTITIONER

## 2025-04-04 PROCEDURE — 73564 X-RAY EXAM KNEE 4 OR MORE: CPT | Mod: 26,50,, | Performed by: RADIOLOGY

## 2025-04-04 PROCEDURE — 73564 X-RAY EXAM KNEE 4 OR MORE: CPT | Mod: TC,50

## 2025-04-04 RX ORDER — LIDOCAINE HYDROCHLORIDE 10 MG/ML
4 INJECTION, SOLUTION INFILTRATION; PERINEURAL
Status: DISCONTINUED | OUTPATIENT
Start: 2025-04-04 | End: 2025-04-04 | Stop reason: HOSPADM

## 2025-04-04 RX ORDER — TRIAMCINOLONE ACETONIDE 40 MG/ML
40 INJECTION, SUSPENSION INTRA-ARTICULAR; INTRAMUSCULAR
Status: DISCONTINUED | OUTPATIENT
Start: 2025-04-04 | End: 2025-04-04 | Stop reason: HOSPADM

## 2025-04-04 RX ADMIN — TRIAMCINOLONE ACETONIDE 40 MG: 40 INJECTION, SUSPENSION INTRA-ARTICULAR; INTRAMUSCULAR at 10:04

## 2025-04-04 RX ADMIN — LIDOCAINE HYDROCHLORIDE 4 ML: 10 INJECTION, SOLUTION INFILTRATION; PERINEURAL at 10:04

## 2025-04-04 NOTE — PROGRESS NOTES
SUBJECTIVE:   History of Present Illness    CHIEF COMPLAINT:  - Bilateral knee pain    HPI:  Dayana presents with bilateral knee pain that began in 2023 and has progressively worsened. Pain is more pronounced on the medial side of the left knee and in the posterior right knee, occurring daily. She has difficulty walking up and down stairs and reports left knee instability.    For pain relief, she uses patches (effective for up to four hours) and occasionally takes Tylenol. PT for knees in the past provided temporary relief during treatment. Currently, she is in therapy for back, which includes whole-body treatment. She has previously seen Dr. Pryor, an orthopedist, who prescribed therapy for the knees.    She experiences hip pain when knee pain intensifies and patches are ineffective. She denies fever, chills, and history of diabetes.    PREVIOUS TREATMENTS:  - She uses patches for pain relief  - She takes Tylenol occasionally  - She had PT for knees in the past  - She is currently in therapy for back, including whole body treatment    MEDICATIONS:  - Tylenol (acetaminophen): occasionally for knee pain  - Patches: used for knee pain, effective for up to four hours    ALLERGIES:  - She has an allergy to penicillin.      ROS:  Constitutional: -fever, -chills  Eyes: -visual changes  ENT: -nasal congestion, -sore throat  Respiratory: -cough, -shortness of breath  Cardiovascular: -chest pain, -palpitations  Gastrointestinal: -nausea, -vomiting  Genitourinary: -hematuria, -dysuria  Integument/Breast: -rash, -pruritus, -breast skin changes  Hematologic/Lymphatic: -easy bruising, -lymphadenopathy  Musculoskeletal: +arthralgia, -myalgia, +limb pain, +back pain, +difficulty walking, +pain with movement  Neurological: -seizures, -tremors  Behavioral/Psych: -hallucinations  Endocrine: -heat intolerance, -cold intolerance         Review of patient's allergies indicates:   Allergen Reactions    Pcn [penicillins] Hives      "    Current Medications[1]    Past Medical History:   Diagnosis Date    Asthma     H/O tubal ligation     H/O: hysterectomy     Hyperlipidemia     Hypertension        Past Surgical History:   Procedure Laterality Date    HYSTERECTOMY  1986    fibroids     OOPHORECTOMY      TUBAL LIGATION         Family History   Problem Relation Name Age of Onset    Heart disease Mother           at 46    Heart disease Father           at 67    Heart disease Sister           26    Heart disease Sister           33    Lupus Sister           at 40     Aneurysm Sister           at 38    Heart failure Brother      Hypertension Sister      Cancer Neg Hx      Diabetes Neg Hx         OBJECTIVE:     PHYSICAL EXAM:  Vital Signs (Most Recent)  Vitals:    25 1020   BP: 126/68   Pulse: 60        ,   Estimated body mass index is 25.66 kg/m² as calculated from the following:    Height as of 25: 5' 1" (1.549 m).    Weight as of 25: 61.6 kg (135 lb 12.9 oz).   General Appearance: Well nourished, well developed, in no acute distress.  HENT: Normal cephalic, oropharynx pink and moist  Eyes: PERRLA bilaterally and EOM x 4  Respiratory: Even and unlabored  Skin: Warm and Dry.   Psychiatric: AAO x 4, Mood & affect are normal.    Physical Exam    MSK: Knee - Left: Left knee muscle strength 5/5. Left knee range of motion 0 to 120 degrees. Left knee not swollen or warm to touch. Normal varus and valgus stress on left knee. Unremarkable anterior draw and Lachman on left knee.  MSK: Knee - Right: Right knee muscle strength 5/5. Right knee range of motion 0 to 120 degrees. Right knee not swollen or warm to touch. Pain in posterior right knee. No pain with varus, valgus, anterior draw, and Lachman on right knee.  Vitals: Blood pressure: 126/68.  IMAGING:  - XR Bilateral Knees: No acute fractures, bilateral osteoarthritic changes (most affecting medial compartments), left knee shows bone-on-bone contact, " right knee shows narrowing but not bone-on-bone contact, patella rubs on femur in lateral view of left knee, changes not felt to be different from prior XR         All radiographs were personally reviewed by me.    ASSESSMENT/PLAN:       ICD-10-CM ICD-9-CM   1. Primary osteoarthritis of both knees  M17.0 715.16     69-year-old female who [facility] of her bilateral knee pain.  She states the pain has been ongoing for several years.  She saw Dr. Pryor in 2023 who referred her over to physical therapy.  Patient reports she tried physical therapy which helped but did not get rid of the pain.  She has been using over-the-counter analgesics including topical patches.  She reports this only gives her temporary relief.  Patient would like to try a steroid injection to see if this will give her better relief.  Her last hemoglobin A1c was 6.3.    Recommend the patient continue her physical therapy and conservative management.    Please see procedural note.    Assessment & Plan    PROCEDURES:  - # Procedures  - Dayana agreed to try steroid injections for knee pain.  - Full effect may take about a week; injections cannot be repeated for 3 months.  - Potential side effect: increased blood sugar, particularly relevant due to patient's pre-diabetic status (A1C 6.3).         This note was generated with the assistance of ambient listening technology. Verbal consent was obtained by the patient and accompanying visitor(s) for the recording of patient appointment to facilitate this note. I attest to having reviewed and edited the generated note for accuracy, though some syntax or spelling errors may persist. Please contact the author of this note for any clarification.             [1]   Current Outpatient Medications   Medication Sig Dispense Refill    albuterol (PROVENTIL/VENTOLIN HFA) 90 mcg/actuation inhaler INHALE 2 PUFFS INTO THE LUNGS EVERY 4 (FOUR) HOURS AS NEEDED FOR WHEEZING. RESCUE 54 g 0     amLODIPine (NORVASC) 5 MG tablet TAKE 1 TABLET BY MOUTH EVERY DAY 90 tablet 2    atorvastatin (LIPITOR) 20 MG tablet Take 1 tablet (20 mg total) by mouth once daily. 90 tablet 3    biotin 1 mg tablet Take 1,000 mcg by mouth 3 (three) times daily.      budesonide-formoterol 160-4.5 mcg (SYMBICORT) 160-4.5 mcg/actuation HFAA Inhale 2 puffs into the lungs every 12 (twelve) hours. Controller 10.2 g 6    cholecalciferol, vitamin D3, 125 mcg (5,000 unit) capsule Take 1 capsule by mouth once daily.      ezetimibe (ZETIA) 10 mg tablet Take 1 tablet (10 mg total) by mouth once daily. 90 tablet 3    fish oil-omega-3 fatty acids 300-1,000 mg capsule Take by mouth once daily.      fluticasone propionate (FLONASE) 50 mcg/actuation nasal spray 1 spray (50 mcg total) by Each Nostril route once daily. 16 g 0    meloxicam (MOBIC) 15 MG tablet Take 1 tablet (15 mg total) by mouth once daily. 10 tablet 0    multivitamin-calcium carb Chew Take by mouth.      nitroGLYCERIN (NITROSTAT) 0.4 MG SL tablet Place 1 tablet under the tongue every 5 (five) minutes as needed.      pulse oximeter (PULSE OXIMETER) device by Apply Externally route 2 (two) times a day. Use twice daily at 8 AM and 3 PM and record the value in [facility] as directed. 1 each 0    traZODone (DESYREL) 50 MG tablet 50 mg.      traZODone (DESYREL) 50 MG tablet Take 1 tablet (50 mg total) by mouth every evening. 90 tablet 3    vitamin B complex (B COMPLEX-VITAMIN B12 ORAL) Take 5,000 mcg by mouth.      aspirin 81 MG Chew Take 1 tablet (81 mg total) by mouth once daily. 30 tablet 11    cetirizine (ZYRTEC) 10 MG tablet Take 1 tablet (10 mg total) by mouth once daily. 90 tablet 3     No current facility-administered medications for this visit.      minimum assist (75% patients effort)

## 2025-04-04 NOTE — PROCEDURES
Large Joint Aspiration/Injection: bilateral knee    Date/Time: 4/4/2025 10:00 AM    Performed by: Sergio Hernandez NP  Authorized by: Sergio Hernandez NP    Consent Done?:  Yes (Verbal)  Indications:  Arthritis and pain  Site marked: the procedure site was marked    Timeout: prior to procedure the correct patient, procedure, and site was verified      Local anesthesia used?: Yes    Local anesthetic:  Lidocaine spray    Details:  Needle Size:  22 G  Ultrasonic Guidance for needle placement?: No    Approach:  Anterolateral  Location:  Knee  Laterality:  Bilateral  Site:  Bilateral knee  Medications (Right):  4 mL LIDOcaine HCL 10 mg/ml (1%) 10 mg/mL (1 %); 40 mg triamcinolone acetonide 40 mg/mL  Medications (Left):  4 mL LIDOcaine HCL 10 mg/ml (1%) 10 mg/mL (1 %); 40 mg triamcinolone acetonide 40 mg/mL  Patient tolerance:  Patient tolerated the procedure well with no immediate complications

## 2025-05-28 ENCOUNTER — TELEPHONE (OUTPATIENT)
Dept: ORTHOPEDICS | Facility: CLINIC | Age: 70
End: 2025-05-28
Payer: MEDICARE

## 2025-05-28 NOTE — PROGRESS NOTES
Called and LVM for pt to contact the office in regards to getting an appt with one of our [facility] doctors for her knee pain

## 2025-05-28 NOTE — TELEPHONE ENCOUNTER
----- Message from Tim sent at 5/28/2025  7:55 AM CDT -----  Regarding: Dayana  Type: Patient Callback Who called: Dayana What is the request in detail: Patient stated that the injections are not working and she would like to know where to go from here. She stated that she called yesterday and left a message and she still has not heard from the office. Please reach out to the patient as soon as possible with that information. Can the clinic reply by [facility]? Yes Would the patient rather a call back or a response via [facility]? Callback Best call back number: [phone] Information:

## 2025-06-06 ENCOUNTER — PATIENT MESSAGE (OUTPATIENT)
Dept: ORTHOPEDICS | Facility: CLINIC | Age: 70
End: 2025-06-06
Payer: MEDICARE

## 2025-06-10 ENCOUNTER — TELEPHONE (OUTPATIENT)
Dept: ORTHOPEDICS | Facility: CLINIC | Age: 70
End: 2025-06-10
Payer: MEDICARE

## 2025-06-10 DIAGNOSIS — M17.0 PRIMARY OSTEOARTHRITIS OF BOTH KNEES: Primary | ICD-10-CM

## 2025-06-10 NOTE — TELEPHONE ENCOUNTER
Spoke to pt and cancelled appt on 6/12/25 due to pt wanting gel injections, message sent to [facility] staff order will be placed and pt scheduled.

## 2025-06-10 NOTE — PROGRESS NOTES
Called and informed pt Esrgio has put in a order for the gel injection as requested on 07/07. Pt verbally understood and says to keep appt but is strongly wanting to have sx with the surgeon she was recommend. Pt says she will call back with her decision.

## 2025-06-12 ENCOUNTER — TELEPHONE (OUTPATIENT)
Dept: ORTHOPEDICS | Facility: CLINIC | Age: 70
End: 2025-06-12
Payer: MEDICARE

## 2025-06-12 NOTE — TELEPHONE ENCOUNTER
Called pt scheduled patient for appointment to discuss knee replacement with Dr. Francis.  Pt verbalized understanding and has no further questions.      ----- Message from Sergio Hernandez NP sent at 6/11/2025 11:45 AM CDT -----  Regarding: appt with Dr. Penny Stone,I had seen this patient in April and gave her a steroid injection for knee arthritis.  She wants to see a surgeon for a knee replacement.  I had initially set her up with Dr. Vo but she canceled as she was given the names of Dr. Francis and Dr. Haley.  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  She has an appointment with me on 7-7 for Durolane injections.  I explained she cannot have surgery for 3 months following a knee injection and she is ok with this but still wants to get in with the surgeon.Is there a way to get her in with Dr. Francis as she was given his name to discuss the knee replacement process?ThanksBrent

## 2025-06-30 DIAGNOSIS — M17.0 PRIMARY OSTEOARTHRITIS OF BOTH KNEES: Primary | ICD-10-CM

## 2025-07-02 ENCOUNTER — OFFICE VISIT (OUTPATIENT)
Dept: ORTHOPEDICS | Facility: CLINIC | Age: 70
End: 2025-07-02
Payer: MEDICARE

## 2025-07-02 ENCOUNTER — HOSPITAL ENCOUNTER (OUTPATIENT)
Dept: RADIOLOGY | Facility: HOSPITAL | Age: 70
Discharge: HOME | End: 2025-07-02
Attending: ORTHOPAEDIC SURGERY
Payer: MEDICARE

## 2025-07-02 VITALS — HEIGHT: 62 IN | WEIGHT: 141.56 LBS | BODY MASS INDEX: 26.05 KG/M2

## 2025-07-02 DIAGNOSIS — M17.0 PRIMARY OSTEOARTHRITIS OF BOTH KNEES: ICD-10-CM

## 2025-07-02 DIAGNOSIS — M17.12 PRIMARY OSTEOARTHRITIS OF LEFT KNEE: Primary | ICD-10-CM

## 2025-07-02 PROCEDURE — 99999 PR PBB SHADOW E&M-EST. PATIENT-LVL III: CPT | Mod: PBBFAC,,, | Performed by: ORTHOPAEDIC SURGERY

## 2025-07-02 NOTE — PROGRESS NOTES
Subjective:      Patient ID: Dayana Su is a 69 y.o. female.    Chief Complaint: Pain of the Right Knee and Pain of the Left Knee      History of Present Illness    CHIEF COMPLAINT:  - Bilateral knee pain    HPI:  - Presents with bilateral knee pain, worse in the left knee, described as severe  - Pain onset approximately one year ago without specific injury  - Reports catching, locking, and giving out of the knees  - Pain worsens when walking up and down stairs, which she now avoids  - Received an injection in April, which was her most recent treatment  - Underwent PT  but found it unhelpful  - Denies numbness and tingling in the legs    PREVIOUS TREATMENTS:  - Injection: April  - PT: A couple of years ago, not helpful    WORK STATUS:  - Currently working  - Specific employment details not provided         Past Medical History:   Diagnosis Date    Asthma     H/O tubal ligation     H/O: hysterectomy     Hyperlipidemia     Hypertension      Past Surgical History:   Procedure Laterality Date    HYSTERECTOMY  1986    fibroids     OOPHORECTOMY      TUBAL LIGATION       Family History   Problem Relation Name Age of Onset    Heart disease Mother           at 46    Heart disease Father           at 67    Heart disease Sister           26    Heart disease Sister           33    Lupus Sister           at 40     Aneurysm Sister           at 38    Heart failure Brother      Hypertension Sister      Cancer Neg Hx      Diabetes Neg Hx       Social History[1]  Medications Ordered Prior to Encounter[2]  Review of patient's allergies indicates:   Allergen Reactions    Pcn [penicillins] Hives       Review of Systems   Constitutional: Negative for chills, fever and night sweats.   HENT:  Negative for hearing loss.    Eyes:  Negative for blurred vision and double vision.   Cardiovascular:  Negative for chest pain, claudication and leg swelling.   Respiratory:  Negative for shortness  "of breath.    Endocrine: Negative for polydipsia, polyphagia and polyuria.   Hematologic/Lymphatic: Negative for adenopathy and bleeding problem. Does not bruise/bleed easily.   Skin:  Negative for poor wound healing.   Gastrointestinal:  Negative for diarrhea and heartburn.   Genitourinary:  Negative for bladder incontinence.   Neurological:  Negative for focal weakness, headaches, numbness, paresthesias and sensory change.   Psychiatric/Behavioral:  The patient is not nervous/anxious.    Allergic/Immunologic: Negative for persistent infections.         Objective:      Body mass index is 26.05 kg/m².  Vitals:    07/02/25 1410   Weight: 64.2 kg (141 lb 8.6 oz)   Height: 5' 1.81" (1.57 m)         General    Constitutional: She is oriented to person, place, and time. She appears well-developed and well-nourished.   HENT:   Head: Normocephalic and atraumatic.   Eyes: EOM are normal.   Cardiovascular:  Normal rate.            Pulmonary/Chest: Effort normal.   Neurological: She is alert and oriented to person, place, and time.   Psychiatric: She has a normal mood and affect. Her behavior is normal.     General Musculoskeletal Exam   Gait: abnormal and antalgic       Right Knee Exam     Inspection   Erythema: absent  Scars: absent  Swelling: present  Effusion: absent  Deformity: absent  Bruising: absent    Tenderness   The patient is experiencing no tenderness.     Range of Motion   Extension:  0   Flexion:  130     Tests   Ligament Examination   Lachman: normal (-1 to 2mm)   MCL - Valgus: normal (0 to 2mm)  LCL - Varus: normal  Patella   Passive Patellar Tilt: neutral    Other   Sensation: normal    Left Knee Exam     Inspection   Erythema: absent  Scars: absent  Swelling: present  Effusion: absent  Deformity: present (varus)  Bruising: absent    Tenderness   The patient tender to palpation of the medial joint line.    Range of Motion   Extension:  5   Flexion:  120     Tests   Stability   Lachman: normal (-1 to 2mm) "   MCL - Valgus: normal (0 to 2mm)  LCL - Varus: normal (0 to 2mm)  Patella   Passive Patellar Tilt: neutral    Other   Popliteal (Baker's) Cyst: present  Sensation: normal    Muscle Strength   Right Lower Extremity   Hip Abduction: 5/5   Quadriceps:  5/5   Hamstrin/5   Left Lower Extremity   Hip Abduction: 5/5   Quadriceps:  5/5   Hamstrin/5     Reflexes     Left Side  Quadriceps:  2+    Right Side   Quadriceps:  2+    Vascular Exam     Right Pulses  Dorsalis Pedis:      2+          Left Pulses  Dorsalis Pedis:      2+          Edema  Right Lower Leg: absent  Left Lower Leg: absent        Radiographs obtained in April were reviewed by me today demonstrate Kellgren Jose Alejandro grade 4 arthritic change of the left knee with osteophyte formation subchondral cysts and sclerosis there is a varus deformity medial compartment is most involved changes tricompartmental Kellgren Jose Alejandro grade 3 changes on the right.          Assessment:       Encounter Diagnosis   Name Primary?    Primary osteoarthritis of left knee Yes          Plan:       Dayana was seen today for pain and pain.    Diagnoses and all orders for this visit:    Primary osteoarthritis of left knee      Treatment options were discussed. The surgical process of left knee replacement was discussed in detail with the patient including a detailed discussion of the procedure itself (including visual model, x-ray review, and literature review). We discussed options including implant type and why I believe the implant selected is a good match for the patient's needs. The patient expressed understanding and agrees to proceed with the planned surgeryThe typical perioperative and post-operative course was discussed and perioperative risks were discussed to the patient's satisfaction.  Risks and complications discussed included but were not limited to the risks of anesthetic complications, infection, bleeding, wound healing complications, stiffness, aseptic  loosening, instability, limb length inequality, neurologic dysfunction including numbness and weakness, additional surgery,  DVT, pulmonary embolism, perioperative medical risks (cardiac, pulmonary, renal, neurologic), and death and the patient elects to proceed.  The patient should get medically cleared and attend the joint seminar.    ASA for DVT prophylaxis    Same day            This note was generated with the assistance of ambient listening technology. Verbal consent was obtained by the patient and accompanying visitor(s) for the recording of patient appointment to facilitate this note. I attest to having reviewed and edited the generated note for accuracy, though some syntax or spelling errors may persist. Please contact the author of this note for any clarification.         [1]   Social History  Socioeconomic History    Marital status:    Tobacco Use    Smoking status: Former     Current packs/day: 0.00     Average packs/day: 0.3 packs/day for 30.0 years (7.5 ttl pk-yrs)     Types: Cigarettes     Start date: 1970     Quit date: 2000     Years since quittin.2     Passive exposure: Never    Smokeless tobacco: Never   Substance and Sexual Activity    Alcohol use: No    Drug use: No    Sexual activity: Not Currently     Social Drivers of Health     Financial Resource Strain: Low Risk  (10/31/2024)    Overall Financial Resource Strain (CARDIA)     Difficulty of Paying Living Expenses: Not hard at all   Food Insecurity: No Food Insecurity (10/31/2024)    Hunger Vital Sign     Worried About Running Out of Food in the Last Year: Never true     Ran Out of Food in the Last Year: Never true   Transportation Needs: No Transportation Needs (2024)    PRAPARE - Transportation     Lack of Transportation (Medical): No     Lack of Transportation (Non-Medical): No   Physical Activity: Sufficiently Active (10/31/2024)    Exercise Vital Sign     Days of Exercise per Week: 4 days     Minutes of Exercise  per Session: 40 min   Stress: Stress Concern Present (10/31/2024)    [facility] Occupational [facility] - Occupational Stress Questionnaire     Feeling of Stress : To some extent   Housing Stability: Unknown (10/31/2024)    Housing Stability Vital Sign     Unable to Pay for Housing in the Last Year: No   [2]   Current Outpatient Medications on File Prior to Visit   Medication Sig Dispense Refill    albuterol (PROVENTIL/VENTOLIN HFA) 90 mcg/actuation inhaler INHALE 2 PUFFS INTO THE LUNGS EVERY 4 (FOUR) HOURS AS NEEDED FOR WHEEZING. RESCUE 54 g 0    amLODIPine (NORVASC) 5 MG tablet TAKE 1 TABLET BY MOUTH EVERY DAY 90 tablet 2    atorvastatin (LIPITOR) 20 MG tablet Take 1 tablet (20 mg total) by mouth once daily. 90 tablet 3    biotin 1 mg tablet Take 1,000 mcg by mouth 3 (three) times daily.      budesonide-formoterol 160-4.5 mcg (SYMBICORT) 160-4.5 mcg/actuation HFAA Inhale 2 puffs into the lungs every 12 (twelve) hours. Controller 10.2 g 6    cholecalciferol, vitamin D3, 125 mcg (5,000 unit) capsule Take 1 capsule by mouth once daily.      ezetimibe (ZETIA) 10 mg tablet Take 1 tablet (10 mg total) by mouth once daily. 90 tablet 3    fish oil-omega-3 fatty acids 300-1,000 mg capsule Take by mouth once daily.      fluticasone propionate (FLONASE) 50 mcg/actuation nasal spray 1 spray (50 mcg total) by Each Nostril route once daily. 16 g 0    meloxicam (MOBIC) 15 MG tablet Take 1 tablet (15 mg total) by mouth once daily. 10 tablet 0    multivitamin-calcium carb Chew Take by mouth.      nitroGLYCERIN (NITROSTAT) 0.4 MG SL tablet Place 1 tablet under the tongue every 5 (five) minutes as needed.      pulse oximeter (PULSE OXIMETER) device by Apply Externally route 2 (two) times a day. Use twice daily at 8 AM and 3 PM and record the value in [facility] as directed. 1 each 0    traZODone (DESYREL) 50 MG tablet 50 mg.      traZODone (DESYREL) 50 MG tablet Take 1 tablet (50 mg total) by mouth every evening. 90 tablet 3     vitamin B complex (B COMPLEX-VITAMIN B12 ORAL) Take 5,000 mcg by mouth.      aspirin 81 MG Chew Take 1 tablet (81 mg total) by mouth once daily. 30 tablet 11    cetirizine (ZYRTEC) 10 MG tablet Take 1 tablet (10 mg total) by mouth once daily. 90 tablet 3    [DISCONTINUED] diclofenac sodium (VOLTAREN) 1 % Gel Apply 2 g topically 4 (four) times daily. 20 g 0    [DISCONTINUED] olopatadine (PATANASE) 0.6 % nasal spray 1 spray by Each Nare route 2 (two) times daily. 1 Bottle 2     Current Facility-Administered Medications on File Prior to Visit   Medication Dose Route Frequency Provider Last Rate Last Admin    [START ON 7/7/2025] hyaluronate sodium, stabilized (DUROLANE) 60 mg/3 mL 60 mg  60 mg Intra-articular 1 time in Clinic/HOD         [START ON 7/7/2025] hyaluronate sodium, stabilized (DUROLANE) 60 mg/3 mL 60 mg  60 mg Intra-articular 1 time in Clinic/HOD

## 2025-07-07 ENCOUNTER — OFFICE VISIT (OUTPATIENT)
Dept: ORTHOPEDICS | Facility: CLINIC | Age: 70
End: 2025-07-07
Payer: MEDICARE

## 2025-07-07 VITALS — DIASTOLIC BLOOD PRESSURE: 81 MMHG | HEART RATE: 65 BPM | SYSTOLIC BLOOD PRESSURE: 143 MMHG

## 2025-07-07 DIAGNOSIS — M17.0 PRIMARY OSTEOARTHRITIS OF BOTH KNEES: Primary | ICD-10-CM

## 2025-07-07 PROCEDURE — 20610 DRAIN/INJ JOINT/BURSA W/O US: CPT | Mod: RT,S$GLB,, | Performed by: NURSE PRACTITIONER

## 2025-07-07 PROCEDURE — 99999 PR PBB SHADOW E&M-EST. PATIENT-LVL III: CPT | Mod: PBBFAC,,, | Performed by: NURSE PRACTITIONER

## 2025-07-07 PROCEDURE — 99499 UNLISTED E&M SERVICE: CPT | Mod: S$GLB,,, | Performed by: NURSE PRACTITIONER

## 2025-07-07 NOTE — PROCEDURES
Large Joint Aspiration/Injection: R knee    Date/Time: 7/7/2025 4:00 PM    Performed by: Sergio Hernandez NP  Authorized by: Sergio Hernandez NP    Consent Done?:  Yes (Verbal)  Indications:  Arthritis and pain  Site marked: the procedure site was marked    Timeout: prior to procedure the correct patient, procedure, and site was verified      Local anesthesia used?: Yes    Local anesthetic:  Lidocaine spray    Details:  Needle Size:  22 G  Ultrasonic Guidance for needle placement?: No    Approach:  Anterolateral  Location:  Knee  Site:  R knee  Medications:  60 mg hyaluronate sodium,stabilized (DUROLANE) 60 mg/3mL injection ([place] formul[place] preferred)  Patient tolerance:  Patient tolerated the procedure well with no immediate complications

## 2025-07-07 NOTE — PROGRESS NOTES
ICD-10-CM ICD-9-CM   1. Primary osteoarthritis of both knees  M17.0 715.16       Dyaana Su presents to clinic today for right knee Durolane injection.    Exam demonstrates the no effusion in the  right knee, and the skin is intact.    She has seen Dr. Francis recently and the plan is to replace her left knee first.    Diagnosis: osteoarthritis knee    Last knee xray was reviewed.    Please see procedure note.    After time out was performed and patient ID, side, and site were verified, the  right  knee was sterilly prepped in the standard fashion.  A 22-gauge needle was introduced into right knee joint from an enrique-lateral site without complication and knee was then injected with 3 ml of Durolane.  Sterile dressing was applied.  The patient was instructed to resume activities as tolerated and to call with any problems.     We will see Dayana Su back in 6 months PRN.

## 2025-07-08 ENCOUNTER — TELEPHONE (OUTPATIENT)
Dept: ORTHOPEDICS | Facility: CLINIC | Age: 70
End: 2025-07-08
Payer: MEDICARE

## 2025-07-08 NOTE — TELEPHONE ENCOUNTER
Called pt and LVM for pt to call back to discuss surgery scheduling.   Copied from CRM #[id number]. Topic: Appointments - Appointment Access  >> Jul 8, 2025  3:16 PM Med Assistant Denice wrote:  Type:  Needs Medical Advice    Who Called: Dayana   Would the patient rather a call back or a response via [facility]? Call back  Best Call Back Number: [phone]   Additional Information: Calling in regards to getting Surgery scheduled

## 2025-07-10 ENCOUNTER — TELEPHONE (OUTPATIENT)
Dept: ORTHOPEDICS | Facility: CLINIC | Age: 70
End: 2025-07-10
Payer: MEDICARE

## 2025-07-10 DIAGNOSIS — M25.562 CHRONIC PAIN OF LEFT KNEE: ICD-10-CM

## 2025-07-10 DIAGNOSIS — M17.12 PRIMARY OSTEOARTHRITIS OF LEFT KNEE: Primary | ICD-10-CM

## 2025-07-10 DIAGNOSIS — G89.29 CHRONIC PAIN OF LEFT KNEE: ICD-10-CM

## 2025-07-10 NOTE — TELEPHONE ENCOUNTER
Finally connected with patient after playing phone tag and scheduled surgery for patient and all pre/post appointments. Also discussed that if anyone falls off the schedule I will move her surgery up. Pt verbalized understanding and has no further questions.    Copied from CRM #[id number]. Topic: General Inquiry - Return Call  >> Jul 9, 2025  3:20 PM Jessenia wrote:  Pt Returning Calls:    Name of Caller: Pt    Who left message for pt: Bertha on 7/8/25    Do you know what the call was regarding:schedule surgery    Call back or response via [facility]: call    Call back # [phone]    Additional Info: (optional):

## 2025-08-04 ENCOUNTER — TELEPHONE (OUTPATIENT)
Dept: ORTHOPEDICS | Facility: CLINIC | Age: 70
End: 2025-08-04
Payer: MEDICARE

## 2025-08-04 NOTE — TELEPHONE ENCOUNTER
Called pt and LVM for her to call our office to give us th okay to cancel all her appointments associated with mary kay surgery on 9/2/25.       ----- Message from SANDRA Cameron sent at 8/4/2025 12:56 PM CDT -----  Received a message from our  today that patient is cancelling her surgery.  Not sure if you received this information.  Thank you,  Brynn Lowe RN  [facility]  [phone]

## 2025-08-18 ENCOUNTER — TELEPHONE (OUTPATIENT)
Dept: SPORTS MEDICINE | Facility: CLINIC | Age: 70
End: 2025-08-18
Payer: MEDICARE

## 2025-08-19 ENCOUNTER — TELEPHONE (OUTPATIENT)
Dept: ORTHOPEDICS | Facility: CLINIC | Age: 70
End: 2025-08-19
Payer: MEDICARE

## 2025-08-21 ENCOUNTER — TELEPHONE (OUTPATIENT)
Dept: INTERNAL MEDICINE | Facility: CLINIC | Age: 70
End: 2025-08-21
Payer: MEDICARE

## 2025-08-21 ENCOUNTER — OFFICE VISIT (OUTPATIENT)
Dept: INTERNAL MEDICINE | Facility: CLINIC | Age: 70
End: 2025-08-21
Payer: MEDICARE

## 2025-08-21 ENCOUNTER — TELEPHONE (OUTPATIENT)
Dept: ORTHOPEDICS | Facility: CLINIC | Age: 70
End: 2025-08-21
Payer: MEDICARE

## 2025-08-21 DIAGNOSIS — R79.1 ELEVATED PARTIAL THROMBOPLASTIN TIME (PTT): ICD-10-CM

## 2025-08-21 DIAGNOSIS — E55.9 MILD VITAMIN D DEFICIENCY: ICD-10-CM

## 2025-08-21 DIAGNOSIS — R73.9 HYPERGLYCEMIA: ICD-10-CM

## 2025-08-21 DIAGNOSIS — I10 ESSENTIAL HYPERTENSION: Primary | ICD-10-CM

## 2025-08-21 DIAGNOSIS — E78.5 HYPERLIPIDEMIA, UNSPECIFIED HYPERLIPIDEMIA TYPE: ICD-10-CM

## 2025-08-21 DIAGNOSIS — Z20.820 VARICELLA CONTACT: ICD-10-CM

## 2025-08-21 PROCEDURE — 1101F PT FALLS ASSESS-DOCD LE1/YR: CPT | Mod: CPTII,95,, | Performed by: INTERNAL MEDICINE

## 2025-08-21 PROCEDURE — 3044F HG A1C LEVEL LT 7.0%: CPT | Mod: CPTII,95,, | Performed by: INTERNAL MEDICINE

## 2025-08-21 PROCEDURE — 98005 SYNCH AUDIO-VIDEO EST LOW 20: CPT | Mod: 95,,, | Performed by: INTERNAL MEDICINE

## 2025-08-21 PROCEDURE — 3288F FALL RISK ASSESSMENT DOCD: CPT | Mod: CPTII,95,, | Performed by: INTERNAL MEDICINE

## 2025-08-22 ENCOUNTER — PROCEDURE VISIT (OUTPATIENT)
Dept: SPORTS MEDICINE | Facility: CLINIC | Age: 70
End: 2025-08-22
Payer: MEDICARE

## 2025-08-22 VITALS
HEART RATE: 65 BPM | WEIGHT: 136.25 LBS | HEIGHT: 62 IN | SYSTOLIC BLOOD PRESSURE: 130 MMHG | DIASTOLIC BLOOD PRESSURE: 79 MMHG | BODY MASS INDEX: 25.07 KG/M2

## 2025-08-22 DIAGNOSIS — M25.562 CHRONIC PAIN OF LEFT KNEE: Primary | ICD-10-CM

## 2025-08-22 DIAGNOSIS — G89.29 CHRONIC PAIN OF LEFT KNEE: Primary | ICD-10-CM

## 2025-08-25 ENCOUNTER — LAB VISIT (OUTPATIENT)
Dept: LAB | Facility: HOSPITAL | Age: 70
End: 2025-08-25
Attending: INTERNAL MEDICINE
Payer: MEDICARE

## 2025-08-25 DIAGNOSIS — E55.9 MILD VITAMIN D DEFICIENCY: ICD-10-CM

## 2025-08-25 DIAGNOSIS — Z20.820 VARICELLA CONTACT: ICD-10-CM

## 2025-08-25 DIAGNOSIS — R79.1 ELEVATED PARTIAL THROMBOPLASTIN TIME (PTT): ICD-10-CM

## 2025-08-25 DIAGNOSIS — I10 ESSENTIAL HYPERTENSION: ICD-10-CM

## 2025-08-25 DIAGNOSIS — E78.5 HYPERLIPIDEMIA, UNSPECIFIED HYPERLIPIDEMIA TYPE: ICD-10-CM

## 2025-08-25 DIAGNOSIS — R73.9 HYPERGLYCEMIA: ICD-10-CM

## 2025-08-25 LAB
25(OH)D3+25(OH)D2 SERPL-MCNC: 45 NG/ML (ref 30–96)
ABSOLUTE EOSINOPHIL (OHS): 0.48 K/UL
ABSOLUTE MONOCYTE (OHS): 0.57 K/UL (ref 0.3–1)
ABSOLUTE NEUTROPHIL COUNT (OHS): 1.76 K/UL (ref 1.8–7.7)
APTT PPP: 32.5 SECONDS (ref 21–32)
BASOPHILS # BLD AUTO: 0.12 K/UL
BASOPHILS NFR BLD AUTO: 2.3 %
CHOLEST SERPL-MCNC: 120 MG/DL (ref 120–199)
CHOLEST/HDLC SERPL: 2.6 {RATIO} (ref 2–5)
CRP SERPL-MCNC: 1 MG/L
EAG (OHS): 120 MG/DL (ref 68–131)
ERYTHROCYTE [DISTWIDTH] IN BLOOD BY AUTOMATED COUNT: 13.1 % (ref 11.5–14.5)
ERYTHROCYTE [SEDIMENTATION RATE] IN BLOOD BY PHOTOMETRIC METHOD: 7 MM/HR
HBA1C MFR BLD: 5.8 % (ref 4–5.6)
HCT VFR BLD AUTO: 42.9 % (ref 37–48.5)
HDLC SERPL-MCNC: 47 MG/DL (ref 40–75)
HDLC SERPL: 39.2 % (ref 20–50)
HGB BLD-MCNC: 13.4 GM/DL (ref 12–16)
IMM GRANULOCYTES # BLD AUTO: 0.01 K/UL (ref 0–0.04)
IMM GRANULOCYTES NFR BLD AUTO: 0.2 % (ref 0–0.5)
LDLC SERPL CALC-MCNC: 60 MG/DL (ref 63–159)
LYMPHOCYTES # BLD AUTO: 2.36 K/UL (ref 1–4.8)
MCH RBC QN AUTO: 29.1 PG (ref 27–31)
MCHC RBC AUTO-ENTMCNC: 31.2 G/DL (ref 32–36)
MCV RBC AUTO: 93 FL (ref 82–98)
NONHDLC SERPL-MCNC: 73 MG/DL
NUCLEATED RBC (/100WBC) (OHS): 0 /100 WBC
PLATELET # BLD AUTO: 354 K/UL (ref 150–450)
PMV BLD AUTO: 10.2 FL (ref 9.2–12.9)
RBC # BLD AUTO: 4.6 M/UL (ref 4–5.4)
RELATIVE EOSINOPHIL (OHS): 9.1 %
RELATIVE LYMPHOCYTE (OHS): 44.5 % (ref 18–48)
RELATIVE MONOCYTE (OHS): 10.8 % (ref 4–15)
RELATIVE NEUTROPHIL (OHS): 33.1 % (ref 38–73)
TRIGL SERPL-MCNC: 65 MG/DL (ref 30–150)
TSH SERPL-ACNC: 1.02 UIU/ML (ref 0.4–4)
WBC # BLD AUTO: 5.3 K/UL (ref 3.9–12.7)

## 2025-08-25 PROCEDURE — 86787 VARICELLA-ZOSTER ANTIBODY: CPT

## 2025-08-25 PROCEDURE — 85652 RBC SED RATE AUTOMATED: CPT

## 2025-08-25 PROCEDURE — 86140 C-REACTIVE PROTEIN: CPT

## 2025-08-25 PROCEDURE — 82306 VITAMIN D 25 HYDROXY: CPT

## 2025-08-25 PROCEDURE — 80061 LIPID PANEL: CPT

## 2025-08-25 PROCEDURE — 83036 HEMOGLOBIN GLYCOSYLATED A1C: CPT

## 2025-08-25 PROCEDURE — 85025 COMPLETE CBC W/AUTO DIFF WBC: CPT

## 2025-08-25 PROCEDURE — 36415 COLL VENOUS BLD VENIPUNCTURE: CPT | Mod: PO

## 2025-08-25 PROCEDURE — 85730 THROMBOPLASTIN TIME PARTIAL: CPT

## 2025-08-25 PROCEDURE — 84443 ASSAY THYROID STIM HORMONE: CPT

## 2025-08-26 LAB
V.ZOSTER IGG INTERP (OHS): POSITIVE
VARICELLA ZOSTER IGG (OHS): 10.1 S/CO

## 2025-08-31 ENCOUNTER — TELEPHONE (OUTPATIENT)
Dept: INTERNAL MEDICINE | Facility: CLINIC | Age: 70
End: 2025-08-31
Payer: MEDICARE

## 2025-08-31 DIAGNOSIS — R79.1 PROLONGED PTT: Primary | ICD-10-CM

## 2025-09-03 ENCOUNTER — TELEPHONE (OUTPATIENT)
Dept: INTERNAL MEDICINE | Facility: CLINIC | Age: 70
End: 2025-09-03
Payer: MEDICARE

## 2025-09-04 ENCOUNTER — OFFICE VISIT (OUTPATIENT)
Dept: SPORTS MEDICINE | Facility: CLINIC | Age: 70
End: 2025-09-04
Payer: MEDICARE

## 2025-09-04 VITALS
DIASTOLIC BLOOD PRESSURE: 77 MMHG | BODY MASS INDEX: 25.15 KG/M2 | SYSTOLIC BLOOD PRESSURE: 124 MMHG | HEIGHT: 62 IN | WEIGHT: 136.69 LBS | HEART RATE: 60 BPM

## 2025-09-04 DIAGNOSIS — G89.29 CHRONIC PAIN OF LEFT KNEE: Primary | ICD-10-CM

## 2025-09-04 DIAGNOSIS — G89.29 CHRONIC PAIN OF RIGHT KNEE: ICD-10-CM

## 2025-09-04 DIAGNOSIS — M17.11 PRIMARY OSTEOARTHRITIS OF RIGHT KNEE: ICD-10-CM

## 2025-09-04 DIAGNOSIS — M25.562 CHRONIC PAIN OF LEFT KNEE: Primary | ICD-10-CM

## 2025-09-04 DIAGNOSIS — M25.561 CHRONIC PAIN OF RIGHT KNEE: ICD-10-CM

## 2025-09-04 DIAGNOSIS — M17.12 PRIMARY OSTEOARTHRITIS OF LEFT KNEE: ICD-10-CM

## 2025-09-04 PROCEDURE — 99999 PR PBB SHADOW E&M-EST. PATIENT-LVL IV: CPT | Mod: PBBFAC,,, | Performed by: STUDENT IN AN ORGANIZED HEALTH CARE EDUCATION/TRAINING PROGRAM
